# Patient Record
Sex: FEMALE | Race: WHITE | NOT HISPANIC OR LATINO | Employment: OTHER | URBAN - METROPOLITAN AREA
[De-identification: names, ages, dates, MRNs, and addresses within clinical notes are randomized per-mention and may not be internally consistent; named-entity substitution may affect disease eponyms.]

---

## 2020-08-05 ENCOUNTER — OFFICE VISIT (OUTPATIENT)
Dept: FAMILY MEDICINE CLINIC | Facility: CLINIC | Age: 50
End: 2020-08-05

## 2020-08-05 VITALS
DIASTOLIC BLOOD PRESSURE: 88 MMHG | TEMPERATURE: 97.1 F | HEART RATE: 74 BPM | BODY MASS INDEX: 26.63 KG/M2 | SYSTOLIC BLOOD PRESSURE: 138 MMHG | HEIGHT: 70 IN | WEIGHT: 186 LBS | OXYGEN SATURATION: 98 %

## 2020-08-05 DIAGNOSIS — N39.46 MIXED STRESS AND URGE URINARY INCONTINENCE: ICD-10-CM

## 2020-08-05 DIAGNOSIS — I48.91 ATRIAL FIBRILLATION, UNSPECIFIED TYPE (HCC): ICD-10-CM

## 2020-08-05 DIAGNOSIS — D50.8 OTHER IRON DEFICIENCY ANEMIA: ICD-10-CM

## 2020-08-05 DIAGNOSIS — Z95.0 PRESENCE OF PERMANENT CARDIAC PACEMAKER: ICD-10-CM

## 2020-08-05 DIAGNOSIS — E03.9 HYPOTHYROIDISM, UNSPECIFIED TYPE: Primary | ICD-10-CM

## 2020-08-05 DIAGNOSIS — R56.9 SEIZURES (HCC): ICD-10-CM

## 2020-08-05 DIAGNOSIS — I63.9 CEREBROVASCULAR ACCIDENT (CVA), UNSPECIFIED MECHANISM (HCC): ICD-10-CM

## 2020-08-05 PROBLEM — Z90.49 HX OF CHOLECYSTECTOMY: Status: ACTIVE | Noted: 2020-05-05

## 2020-08-05 PROBLEM — Z98.84 H/O BARIATRIC SURGERY: Status: ACTIVE | Noted: 2020-05-05

## 2020-08-05 PROBLEM — R07.9 CHEST PAIN: Status: ACTIVE | Noted: 2020-05-05

## 2020-08-05 PROCEDURE — 99214 OFFICE O/P EST MOD 30 MIN: CPT | Performed by: NURSE PRACTITIONER

## 2020-08-05 RX ORDER — GABAPENTIN 300 MG/1
300 CAPSULE ORAL
COMMUNITY
End: 2021-11-01

## 2020-08-05 RX ORDER — LEVOTHYROXINE SODIUM 0.07 MG/1
75 TABLET ORAL DAILY
COMMUNITY
End: 2020-09-11 | Stop reason: SDUPTHER

## 2020-08-05 RX ORDER — OXYBUTYNIN CHLORIDE 5 MG/1
5 TABLET ORAL 4 TIMES DAILY
Qty: 120 TABLET | Refills: 2 | Status: SHIPPED | OUTPATIENT
Start: 2020-08-05 | End: 2020-10-16

## 2020-08-05 RX ORDER — TRAZODONE HYDROCHLORIDE 50 MG/1
50 TABLET ORAL NIGHTLY
COMMUNITY
End: 2022-01-28 | Stop reason: SDUPTHER

## 2020-08-05 RX ORDER — TOPIRAMATE 100 MG/1
100 TABLET, FILM COATED ORAL NIGHTLY
COMMUNITY
End: 2022-01-28 | Stop reason: SDUPTHER

## 2020-08-05 RX ORDER — ASCORBIC ACID 500 MG
500 TABLET ORAL DAILY
COMMUNITY

## 2020-08-05 RX ORDER — ASPIRIN 81 MG/1
325 TABLET, CHEWABLE ORAL DAILY
COMMUNITY
End: 2021-08-11

## 2020-08-05 RX ORDER — FERROUS SULFATE 325(65) MG
325 TABLET ORAL
COMMUNITY

## 2020-08-05 RX ORDER — OXYBUTYNIN CHLORIDE 5 MG/1
5 TABLET ORAL 2 TIMES DAILY
COMMUNITY
End: 2020-08-05 | Stop reason: SDUPTHER

## 2020-08-05 NOTE — PROGRESS NOTES
Subjective   Hanh Mederos is a 49 y.o. female.     Chief Complaint   Patient presents with   • Establish Care   • Urinary Incontinence        History of Present Illness     Patient is here today to establish care as a new patient. She states she just moved back about 3 months ago and was seeing Dr. Vieira.  She states she had a stroke about 6 years ago and had had several issues since.  States she saw specialty in [place] there and they told her wasn't any issues with prolapse.       Had blood drawn the beginning of July and states all labs were stable.  She is always anemic per her.      INCONTINENCE:  5mg BID 4 months ago increase-has gotten just about 10% better.       STROKE: no seizures.  Had them since 17 months and then since stroke.  Stopped.  Topamax and trazodone control, as well as gabapentin.       She does state she has less patience lately and is watching for other symptoms of bipolar, but sees none. She has never had diagnosis, but both mother and father, as well as daughter has diagnosis.    She is also concerned about depression or her snappiness today.  She isn't sure if it is just because of being home and situational or what.       RIAZ 7 score of 0  PHQ 9 score of  3  MOOD questionnaire negative          The following portions of the patient's history were reviewed and updated as appropriate: allergies, current medications, past family history, past medical history, past social history, past surgical history and problem list.    Past Medical History:   Diagnosis Date   • A-fib (CMS/HCC)    • C. difficile colitis    • Cancer (CMS/HCC)     uterine   • CHF (congestive heart failure) (CMS/[facility])    • MRSA carrier    • Pacemaker    • Stroke (CMS/HCC)        Past Surgical History:   Procedure Laterality Date   • ABDOMINAL SURGERY      had 20   • HYSTERECTOMY  1998    total        Family History   Problem Relation Age of Onset   • Cancer Mother    • Hypertension Mother    • Stroke Mother    •  Hypertension Father    • Stroke Father        Social History     Socioeconomic History   • Marital status:      Spouse name: Not on file   • Number of children: Not on file   • Years of education: Not on file   • Highest education level: Not on file   Tobacco Use   • Smoking status: Never Smoker   • Smokeless tobacco: Never Used   Substance and Sexual Activity   • Alcohol use: Never     Frequency: Never   • Drug use: Never         Current Outpatient Medications:   •  aspirin 81 MG chewable tablet, Chew 81 mg Daily., Disp: , Rfl:   •  ferrous sulfate 325 (65 FE) MG tablet, Take 325 mg by mouth Daily With Breakfast., Disp: , Rfl:   •  gabapentin (NEURONTIN) 300 MG capsule, Take 300 mg by mouth., Disp: , Rfl:   •  levothyroxine (SYNTHROID, LEVOTHROID) 75 MCG tablet, Take 75 mcg by mouth Daily., Disp: , Rfl:   •  MULTIPLE VITAMINS-MINERALS ER PO, Take  by mouth., Disp: , Rfl:   •  oxybutynin (DITROPAN) 5 MG tablet, Take 1 tablet by mouth 4 (Four) Times a Day., Disp: 120 tablet, Rfl: 2  •  topiramate (TOPAMAX) 100 MG tablet, Take 100 mg by mouth Daily., Disp: , Rfl:   •  traZODone (DESYREL) 50 MG tablet, Take 50 mg by mouth Daily., Disp: , Rfl:   •  vitamin C (ASCORBIC ACID) 500 MG tablet, Take 500 mg by mouth Daily., Disp: , Rfl:     Review of Systems   Constitutional: Negative for fatigue and fever.   Respiratory: Negative for cough, shortness of breath and wheezing.    Cardiovascular: Negative for chest pain.   Gastrointestinal: Negative for abdominal pain, constipation, diarrhea, nausea and vomiting.   Genitourinary: Positive for urinary incontinence. Negative for dysuria and urgency.   Skin: Negative.    Neurological: Negative for dizziness and headache.   Psychiatric/Behavioral: Positive for depressed mood. Negative for sleep disturbance and suicidal ideas. The patient is nervous/anxious.        Objective   Vitals:    08/05/20 1350   BP: 138/88   Pulse: 74   Temp: 97.1 °F (36.2 °C)   SpO2: 98%   Weight: 84.4  "kg (186 lb)   Height: 177.8 cm (70\")     Body mass index is 26.69 kg/m².  Physical Exam   Constitutional: She is oriented to person, place, and time. She appears well-developed and well-nourished.   Cardiovascular: Normal rate, regular rhythm, normal heart sounds and intact distal pulses.   Pulmonary/Chest: Effort normal and breath sounds normal.   Neurological: She is alert and oriented to person, place, and time.   Psychiatric: She has a normal mood and affect. Her behavior is normal. Judgment and thought content normal.         Assessment/Plan   Hanh was seen today for establish care and urinary incontinence.    Diagnoses and all orders for this visit:    Hypothyroidism, unspecified type    Cerebrovascular accident (CVA), unspecified mechanism (CMS/HCC)    Presence of permanent cardiac pacemaker    Atrial fibrillation, unspecified type (CMS/HCC)    Seizures (CMS/HCC)    Other iron deficiency anemia    Mixed stress and urge urinary incontinence    Other orders  -     oxybutynin (DITROPAN) 5 MG tablet; Take 1 tablet by mouth 4 (Four) Times a Day.      HYPOTHYROID: continue medication-will obtain records. Will plan on rechecking level in 3 months    CVA-continue medication    AFIB/pacemaker-continue medication    SEIZURES-continue medication    MOOD: we discussed possible increase of topamax if worried about mood stabilization.  She doesn't want to right now, but will monitor mood and notify me if she feels like worsening. Discussed likely situational.      Iron def anemia: continue current medication: will recheck level at next OV    INCONTINENCE: will trial increase of ditropan.  If no resolution, may trial another medication.      Follow up in 3 months, sooner if any issues.     Time spent with patient was 50 minutes in room.             Patient Instructions   Urinary Incontinence    Urinary incontinence refers to a condition in which a person is unable to control where and when to pass urine. A person with this " condition will urinate when he or she does not mean to (involuntarily).  What are the causes?  This condition may be caused by:  · Medicines.  · Infections.  · Constipation.  · Overactive bladder muscles.  · Weak bladder muscles.  · Weak pelvic floor muscles. These muscles provide support for the bladder, intestine, and, in women, the uterus.  · Enlarged prostate in men. The prostate is a gland near the bladder. When it gets too big, it can pinch the urethra. With the urethra blocked, the bladder can weaken and lose the ability to empty properly.  · Surgery.  · Emotional factors, such as anxiety, stress, or post-traumatic stress disorder (PTSD).  · Pelvic organ prolapse. This happens in women when organs shift out of place and into the vagina. This shift can prevent the bladder and urethra from working properly.  What increases the risk?  The following factors may make you more likely to develop this condition:  · Older age.  · Obesity and physical inactivity.  · Pregnancy and childbirth.  · Menopause.  · Diseases that affect the nerves or spinal cord (neurological diseases).  · Long-term (chronic) coughing. This can increase pressure on the bladder and pelvic floor muscles.  What are the signs or symptoms?  Symptoms may vary depending on the type of urinary incontinence you have. They include:  · A sudden urge to urinate, but passing urine involuntarily before you can get to a bathroom (urge incontinence).  · Suddenly passing urine with any activity that forces urine to pass, such as coughing, laughing, exercise, or sneezing (stress incontinence).  · Needing to urinate often, but urinating only a small amount, or constantly dribbling urine (overflow incontinence).  · Urinating because you cannot get to the bathroom in time due to a physical disability, such as arthritis or injury, or communication and thinking problems, such as Alzheimer disease (functional incontinence).  How is this diagnosed?  This condition may  be diagnosed based on:  · Your medical history.  · A physical exam.  · Tests, such as:  ? Urine tests.  ? X-rays of your kidney and bladder.  ? Ultrasound.  ? CT scan.  ? Cystoscopy. In this procedure, a health care provider inserts a tube with a light and camera (cystoscope) through the urethra and into the bladder in order to check for problems.  ? Urodynamic testing. These tests assess how well the bladder, urethra, and sphincter can store and release urine. There are different types of urodynamic tests, and they vary depending on what the test is measuring.  To help diagnose your condition, your health care provider may recommend that you keep a log of when you urinate and how much you urinate.  How is this treated?  Treatment for this condition depends on the type of incontinence that you have and its cause. Treatment may include:  · Lifestyle changes, such as:  ? Quitting smoking.  ? Maintaining a healthy weight.  ? Staying active. Try to get 150 minutes of moderate-intensity exercise every week. Ask your health care provider which activities are safe for you.  ? Eating a healthy diet.  § Avoid high-fat foods, like fried foods.  § Avoid refined carbohydrates like white bread and white rice.  § Limit how much alcohol and caffeine you drink.  § Increase your fiber intake. Foods such as fresh fruits, vegetables, beans, and whole grains are healthy sources of fiber.  · Pelvic floor muscle exercises.  · Bladder training, such as lengthening the amount of time between bathroom breaks, or using the bathroom at regular intervals.  · Using techniques to suppress bladder urges. This can include distraction techniques or controlled breathing exercises.  · Medicines to relax the bladder muscles and prevent bladder spasms.  · Medicines to help slow or prevent the growth of a man's prostate.  · Botox injections. These can help relax the bladder muscles.  · Using pulses of electricity to help change bladder reflexes  (electrical nerve stimulation).  · For women, using a medical device to prevent urine leaks. This is a small, tampon-like, disposable device that is inserted into the urethra.  · Injecting collagen or carbon beads (bulking agents) into the urinary sphincter. These can help thicken tissue and close the bladder opening.  · Surgery.  Follow these instructions at home:  Lifestyle  · Limit alcohol and caffeine. These can fill your bladder quickly and irritate it.  · Keep yourself clean to help prevent odors and skin damage. Ask your doctor about special skin creams and cleansers that can protect the skin from urine.  · Consider wearing pads or adult diapers. Make sure to change them regularly, and always change them right after experiencing incontinence.  General instructions  · Take over-the-counter and prescription medicines only as told by your health care provider.  · Use the bathroom about every 3-4 hours, even if you do not feel the need to urinate. Try to empty your bladder completely every time. After urinating, wait a minute. Then try to urinate again.  · Make sure you are in a relaxed position while urinating.  · If your incontinence is caused by nerve problems, keep a log of the medicines you take and the times you go to the bathroom.  · Keep all follow-up visits as told by your health care provider. This is important.  Contact a health care provider if:  · You have pain that gets worse.  · Your incontinence gets worse.  Get help right away if:  · You have a fever or chills.  · You are unable to urinate.  · You have redness in your groin area or down your legs.  Summary  · Urinary incontinence refers to a condition in which a person is unable to control where and when to pass urine.  · This condition may be caused by medicines, infection, weak bladder muscles, weak pelvic floor muscles, enlargement of the prostate (in men), or surgery.  · The following factors increase your risk for developing this condition:  older age, obesity, pregnancy and childbirth, menopause, neurological diseases, and chronic coughing.  · There are several types of urinary incontinence. They include urge incontinence, stress incontinence, overflow incontinence, and functional incontinence.  · This condition is usually treated first with lifestyle and behavioral changes, such as quitting smoking, eating a healthier diet, and doing regular pelvic floor exercises. Other treatment options include medicines, bulking agents, medical devices, electrical nerve stimulation, or surgery.  This information is not intended to replace advice given to you by your health care provider. Make sure you discuss any questions you have with your health care provider.  Document Released: 01/25/2006 Document Revised: 12/28/2018 Document Reviewed: 03/29/2018  [employer] Patient Education © 2020 [employer]

## 2020-08-05 NOTE — PATIENT INSTRUCTIONS
Urinary Incontinence    Urinary incontinence refers to a condition in which a person is unable to control where and when to pass urine. A person with this condition will urinate when he or she does not mean to (involuntarily).  What are the causes?  This condition may be caused by:  · Medicines.  · Infections.  · Constipation.  · Overactive bladder muscles.  · Weak bladder muscles.  · Weak pelvic floor muscles. These muscles provide support for the bladder, intestine, and, in women, the uterus.  · Enlarged prostate in men. The prostate is a gland near the bladder. When it gets too big, it can pinch the urethra. With the urethra blocked, the bladder can weaken and lose the ability to empty properly.  · Surgery.  · Emotional factors, such as anxiety, stress, or post-traumatic stress disorder (PTSD).  · Pelvic organ prolapse. This happens in women when organs shift out of place and into the vagina. This shift can prevent the bladder and urethra from working properly.  What increases the risk?  The following factors may make you more likely to develop this condition:  · Older age.  · Obesity and physical inactivity.  · Pregnancy and childbirth.  · Menopause.  · Diseases that affect the nerves or spinal cord (neurological diseases).  · Long-term (chronic) coughing. This can increase pressure on the bladder and pelvic floor muscles.  What are the signs or symptoms?  Symptoms may vary depending on the type of urinary incontinence you have. They include:  · A sudden urge to urinate, but passing urine involuntarily before you can get to a bathroom (urge incontinence).  · Suddenly passing urine with any activity that forces urine to pass, such as coughing, laughing, exercise, or sneezing (stress incontinence).  · Needing to urinate often, but urinating only a small amount, or constantly dribbling urine (overflow incontinence).  · Urinating because you cannot get to the bathroom in time due to a physical disability, such as  arthritis or injury, or communication and thinking problems, such as Alzheimer disease (functional incontinence).  How is this diagnosed?  This condition may be diagnosed based on:  · Your medical history.  · A physical exam.  · Tests, such as:  ? Urine tests.  ? X-rays of your kidney and bladder.  ? Ultrasound.  ? CT scan.  ? Cystoscopy. In this procedure, a health care provider inserts a tube with a light and camera (cystoscope) through the urethra and into the bladder in order to check for problems.  ? Urodynamic testing. These tests assess how well the bladder, urethra, and sphincter can store and release urine. There are different types of urodynamic tests, and they vary depending on what the test is measuring.  To help diagnose your condition, your health care provider may recommend that you keep a log of when you urinate and how much you urinate.  How is this treated?  Treatment for this condition depends on the type of incontinence that you have and its cause. Treatment may include:  · Lifestyle changes, such as:  ? Quitting smoking.  ? Maintaining a healthy weight.  ? Staying active. Try to get 150 minutes of moderate-intensity exercise every week. Ask your health care provider which activities are safe for you.  ? Eating a healthy diet.  § Avoid high-fat foods, like fried foods.  § Avoid refined carbohydrates like white bread and white rice.  § Limit how much alcohol and caffeine you drink.  § Increase your fiber intake. Foods such as fresh fruits, vegetables, beans, and whole grains are healthy sources of fiber.  · Pelvic floor muscle exercises.  · Bladder training, such as lengthening the amount of time between bathroom breaks, or using the bathroom at regular intervals.  · Using techniques to suppress bladder urges. This can include distraction techniques or controlled breathing exercises.  · Medicines to relax the bladder muscles and prevent bladder spasms.  · Medicines to help slow or prevent the  growth of a man's prostate.  · Botox injections. These can help relax the bladder muscles.  · Using pulses of electricity to help change bladder reflexes (electrical nerve stimulation).  · For women, using a medical device to prevent urine leaks. This is a small, tampon-like, disposable device that is inserted into the urethra.  · Injecting collagen or carbon beads (bulking agents) into the urinary sphincter. These can help thicken tissue and close the bladder opening.  · Surgery.  Follow these instructions at home:  Lifestyle  · Limit alcohol and caffeine. These can fill your bladder quickly and irritate it.  · Keep yourself clean to help prevent odors and skin damage. Ask your doctor about special skin creams and cleansers that can protect the skin from urine.  · Consider wearing pads or adult diapers. Make sure to change them regularly, and always change them right after experiencing incontinence.  General instructions  · Take over-the-counter and prescription medicines only as told by your health care provider.  · Use the bathroom about every 3-4 hours, even if you do not feel the need to urinate. Try to empty your bladder completely every time. After urinating, wait a minute. Then try to urinate again.  · Make sure you are in a relaxed position while urinating.  · If your incontinence is caused by nerve problems, keep a log of the medicines you take and the times you go to the bathroom.  · Keep all follow-up visits as told by your health care provider. This is important.  Contact a health care provider if:  · You have pain that gets worse.  · Your incontinence gets worse.  Get help right away if:  · You have a fever or chills.  · You are unable to urinate.  · You have redness in your groin area or down your legs.  Summary  · Urinary incontinence refers to a condition in which a person is unable to control where and when to pass urine.  · This condition may be caused by medicines, infection, weak bladder  muscles, weak pelvic floor muscles, enlargement of the prostate (in men), or surgery.  · The following factors increase your risk for developing this condition: older age, obesity, pregnancy and childbirth, menopause, neurological diseases, and chronic coughing.  · There are several types of urinary incontinence. They include urge incontinence, stress incontinence, overflow incontinence, and functional incontinence.  · This condition is usually treated first with lifestyle and behavioral changes, such as quitting smoking, eating a healthier diet, and doing regular pelvic floor exercises. Other treatment options include medicines, bulking agents, medical devices, electrical nerve stimulation, or surgery.  This information is not intended to replace advice given to you by your health care provider. Make sure you discuss any questions you have with your health care provider.  Document Released: 01/25/2006 Document Revised: 12/28/2018 Document Reviewed: 03/29/2018  [employer] Patient Education © 2020 [employer]

## 2020-09-11 ENCOUNTER — OFFICE VISIT (OUTPATIENT)
Dept: FAMILY MEDICINE CLINIC | Facility: CLINIC | Age: 50
End: 2020-09-11

## 2020-09-11 VITALS
SYSTOLIC BLOOD PRESSURE: 118 MMHG | WEIGHT: 186 LBS | BODY MASS INDEX: 26.63 KG/M2 | TEMPERATURE: 96.9 F | HEIGHT: 70 IN | DIASTOLIC BLOOD PRESSURE: 68 MMHG | HEART RATE: 68 BPM | OXYGEN SATURATION: 100 %

## 2020-09-11 DIAGNOSIS — G43.909 MIGRAINE WITHOUT STATUS MIGRAINOSUS, NOT INTRACTABLE, UNSPECIFIED MIGRAINE TYPE: Primary | ICD-10-CM

## 2020-09-11 PROCEDURE — 99213 OFFICE O/P EST LOW 20 MIN: CPT | Performed by: NURSE PRACTITIONER

## 2020-09-11 RX ORDER — EPINEPHRINE 0.3 MG/.3ML
0.3 INJECTION SUBCUTANEOUS ONCE
Qty: 2 EACH | Refills: 2 | Status: SHIPPED | OUTPATIENT
Start: 2020-09-11 | End: 2020-09-11

## 2020-09-11 RX ORDER — LEVOTHYROXINE SODIUM 0.07 MG/1
75 TABLET ORAL DAILY
Qty: 90 TABLET | Refills: 0 | Status: SHIPPED | OUTPATIENT
Start: 2020-09-11 | End: 2021-04-07

## 2020-09-11 RX ORDER — RIZATRIPTAN BENZOATE 10 MG/1
10 TABLET, ORALLY DISINTEGRATING ORAL ONCE AS NEEDED
Qty: 12 TABLET | Refills: 0 | Status: SHIPPED | OUTPATIENT
Start: 2020-09-11 | End: 2021-01-08

## 2020-09-11 RX ORDER — NALOXONE HYDROCHLORIDE 4 MG/.1ML
1 SPRAY NASAL AS NEEDED
Qty: 2 EACH | Refills: 1 | Status: SHIPPED | OUTPATIENT
Start: 2020-09-11

## 2020-09-11 RX ORDER — LEVOTHYROXINE SODIUM 0.07 MG/1
75 TABLET ORAL DAILY
Qty: 90 TABLET | Refills: 0 | Status: SHIPPED | OUTPATIENT
Start: 2020-09-11 | End: 2020-09-11

## 2020-09-11 NOTE — PROGRESS NOTES
Subjective   Hanh Mederos is a 49 y.o. female.     Chief Complaint   Patient presents with   • Headache     needs rapid covid test        History of Present Illness       Patient is here today with complaint of headache.  She is not having any other symptoms of covid other than headache.     Having pressure behind left eye.    Rates that it is a level 5 yesterday and today is only a level 3 today.  States it is less than she left house.    Slept okay yesterday   had headache yesterday as well.    Snacked, didn't eat well yesterday.  Had her cast off 2 days ago.  Was on 3 extra weeks.       She reports needing a narcan and epipen.  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   She needs refills of her levothyroxine sent to [facility] because she is going as well.        The following portions of the patient's history were reviewed and updated as appropriate: allergies, current medications, past family history, past medical history, past social history, past surgical history and problem list.    Past Medical History:   Diagnosis Date   • A-fib (CMS/HCC)    • C. difficile colitis    • Cancer (CMS/HCC)     uterine   • CHF (congestive heart failure) (CMS/HCC)    • MRSA carrier    • Pacemaker    • Stroke (CMS/HCC)        Past Surgical History:   Procedure Laterality Date   • ABDOMINAL SURGERY      had    • HYSTERECTOMY      total        Family History   Problem Relation Age of Onset   • Cancer Mother    • Hypertension Mother    • Stroke Mother    • Hypertension Father    • Stroke Father        Social History     Socioeconomic History   • Marital status:      Spouse name: Not on file   • Number of children: Not on file   • Years of education: Not on file   • Highest education level: Not on file   Tobacco Use   • Smoking status: Never Smoker   • Smokeless tobacco: Never Used   Substance and Sexual Activity   • Alcohol use: Never      Frequency: Never   • Drug use: Never         Current Outpatient Medications:   •  aspirin 81 MG chewable tablet, Chew 81 mg Daily., Disp: , Rfl:   •  ferrous sulfate 325 (65 FE) MG tablet, Take 325 mg by mouth Daily With Breakfast., Disp: , Rfl:   •  gabapentin (NEURONTIN) 300 MG capsule, Take 300 mg by mouth., Disp: , Rfl:   •  levothyroxine (SYNTHROID, LEVOTHROID) 75 MCG tablet, Take 1 tablet by mouth Daily., Disp: 90 tablet, Rfl: 0  •  MULTIPLE VITAMINS-MINERALS ER PO, Take  by mouth., Disp: , Rfl:   •  oxybutynin (DITROPAN) 5 MG tablet, Take 1 tablet by mouth 4 (Four) Times a Day., Disp: 120 tablet, Rfl: 2  •  topiramate (TOPAMAX) 100 MG tablet, Take 100 mg by mouth Daily., Disp: , Rfl:   •  traZODone (DESYREL) 50 MG tablet, Take 50 mg by mouth Daily., Disp: , Rfl:   •  vitamin C (ASCORBIC ACID) 500 MG tablet, Take 500 mg by mouth Daily., Disp: , Rfl:   •  EPINEPHrine (EPIPEN) 0.3 MG/0.3ML solution auto-injector injection, Inject 0.3 mL into the appropriate muscle as directed by prescriber 1 (One) Time for 1 dose., Disp: 2 each, Rfl: 2  •  naloxone (Narcan) 4 MG/0.1ML nasal spray, 1 spray into the nostril(s) as directed by provider As Needed (overdose)., Disp: 2 each, Rfl: 1  •  rizatriptan MLT (MAXALT-MLT) 10 MG disintegrating tablet, Place 1 tablet on the tongue 1 (One) Time As Needed for Migraine for up to 1 dose. May repeat in 2 hours if needed, Disp: 12 tablet, Rfl: 0    Review of Systems   Constitutional: Negative for fatigue and fever.   Respiratory: Negative for cough, shortness of breath and wheezing.    Cardiovascular: Negative for chest pain.   Gastrointestinal: Negative for abdominal pain, constipation, diarrhea, nausea and vomiting.   Genitourinary: Negative for dysuria and urgency.   Skin: Negative.    Neurological: Positive for headache. Negative for dizziness.       Objective   Vitals:    09/11/20 1253   BP: 118/68   Pulse: 68   Temp: 96.9 °F (36.1 °C)   SpO2: 100%   Weight: 84.4 kg (186 lb)  "  Height: 177.8 cm (70\")     Body mass index is 26.69 kg/m².  Physical Exam   Constitutional: She is oriented to person, place, and time. She appears well-developed and well-nourished.   HENT:   Head: Normocephalic and atraumatic.   Right Ear: Tympanic membrane mobility is abnormal. cerumen impaction (not completely impacted, just residual ) is present.  Left Ear: Tympanic membrane mobility is abnormal. An impacted cerumen (not completely impacted, just residual) is present.  Cardiovascular: Normal rate, regular rhythm and normal heart sounds.   Pulmonary/Chest: Effort normal and breath sounds normal.   Neurological: She is alert and oriented to person, place, and time.   Psychiatric: She has a normal mood and affect. Her behavior is normal. Judgment and thought content normal.         Assessment/Plan   Hanh was seen today for headache.    Diagnoses and all orders for this visit:    Migraine without status migrainosus, not intractable, unspecified migraine type    Other orders  -     EPINEPHrine (EPIPEN) 0.3 MG/0.3ML solution auto-injector injection; Inject 0.3 mL into the appropriate muscle as directed by prescriber 1 (One) Time for 1 dose.  -     naloxone (Narcan) 4 MG/0.1ML nasal spray; 1 spray into the nostril(s) as directed by provider As Needed (overdose).  -     levothyroxine (SYNTHROID, LEVOTHROID) 75 MCG tablet; Take 1 tablet by mouth Daily.  -     rizatriptan MLT (MAXALT-MLT) 10 MG disintegrating tablet; Place 1 tablet on the tongue 1 (One) Time As Needed for Migraine for up to 1 dose. May repeat in 2 hours if needed      We will start maxalt.  Suggested allergy medication.    Also suggested use of allergy medication prn.    Follow up in office as needed if no improvement.             There are no Patient Instructions on file for this visit.        "

## 2020-09-11 NOTE — TELEPHONE ENCOUNTER
PATIENT CALLED STATED SHE IS GOING TO HELP WITH THE WILDFIRES AND WILL NEED A REFILL ON THE LEVOTHYROXINE TO LOCAL PHARMACY.

## 2020-10-08 ENCOUNTER — OFFICE VISIT (OUTPATIENT)
Dept: FAMILY MEDICINE CLINIC | Facility: CLINIC | Age: 50
End: 2020-10-08

## 2020-10-08 VITALS
OXYGEN SATURATION: 99 % | HEART RATE: 85 BPM | HEIGHT: 70 IN | WEIGHT: 185.2 LBS | SYSTOLIC BLOOD PRESSURE: 124 MMHG | BODY MASS INDEX: 26.51 KG/M2 | DIASTOLIC BLOOD PRESSURE: 78 MMHG | TEMPERATURE: 97.1 F

## 2020-10-08 DIAGNOSIS — E55.9 VITAMIN D DEFICIENCY, UNSPECIFIED: ICD-10-CM

## 2020-10-08 DIAGNOSIS — D50.8 OTHER IRON DEFICIENCY ANEMIA: ICD-10-CM

## 2020-10-08 DIAGNOSIS — Z12.31 ENCOUNTER FOR SCREENING MAMMOGRAM FOR BREAST CANCER: ICD-10-CM

## 2020-10-08 DIAGNOSIS — I63.9 CEREBROVASCULAR ACCIDENT (CVA), UNSPECIFIED MECHANISM (HCC): ICD-10-CM

## 2020-10-08 DIAGNOSIS — B35.3 ATHLETE'S FOOT ON LEFT: ICD-10-CM

## 2020-10-08 DIAGNOSIS — I48.91 ATRIAL FIBRILLATION, UNSPECIFIED TYPE (HCC): ICD-10-CM

## 2020-10-08 DIAGNOSIS — E03.9 HYPOTHYROIDISM, UNSPECIFIED TYPE: ICD-10-CM

## 2020-10-08 DIAGNOSIS — G43.909 MIGRAINE WITHOUT STATUS MIGRAINOSUS, NOT INTRACTABLE, UNSPECIFIED MIGRAINE TYPE: ICD-10-CM

## 2020-10-08 DIAGNOSIS — N39.46 MIXED STRESS AND URGE URINARY INCONTINENCE: ICD-10-CM

## 2020-10-08 DIAGNOSIS — Z00.01 ENCOUNTER FOR GENERAL ADULT MEDICAL EXAMINATION WITH ABNORMAL FINDINGS: Primary | ICD-10-CM

## 2020-10-08 DIAGNOSIS — Z12.31 ENCOUNTER FOR SCREENING MAMMOGRAM FOR MALIGNANT NEOPLASM OF BREAST: ICD-10-CM

## 2020-10-08 PROCEDURE — 99396 PREV VISIT EST AGE 40-64: CPT | Performed by: NURSE PRACTITIONER

## 2020-10-08 RX ORDER — CLOTRIMAZOLE 1 %
CREAM (GRAM) TOPICAL 2 TIMES DAILY
Qty: 60 G | Refills: 0 | Status: SHIPPED | OUTPATIENT
Start: 2020-10-08 | End: 2021-05-03
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
## 2020-10-09 ENCOUNTER — RESULTS ENCOUNTER (OUTPATIENT)
Dept: FAMILY MEDICINE CLINIC | Facility: CLINIC | Age: 50
End: 2020-10-09

## 2020-10-09 DIAGNOSIS — E03.9 HYPOTHYROIDISM, UNSPECIFIED TYPE: ICD-10-CM

## 2020-10-09 DIAGNOSIS — D50.8 OTHER IRON DEFICIENCY ANEMIA: ICD-10-CM

## 2020-10-09 DIAGNOSIS — I48.91 ATRIAL FIBRILLATION, UNSPECIFIED TYPE (HCC): ICD-10-CM

## 2020-10-09 DIAGNOSIS — E55.9 VITAMIN D DEFICIENCY, UNSPECIFIED: ICD-10-CM

## 2020-10-10 LAB
25(OH)D3+25(OH)D2 SERPL-MCNC: 48.8 NG/ML (ref 30–100)
ALBUMIN SERPL-MCNC: 4.6 G/DL (ref 3.5–5.2)
ALBUMIN/GLOB SERPL: 2.7 G/DL
ALP SERPL-CCNC: 148 U/L (ref 39–117)
ALT SERPL-CCNC: 17 U/L (ref 1–33)
AST SERPL-CCNC: 21 U/L (ref 1–32)
BASOPHILS # BLD AUTO: 0.04 10*3/MM3 (ref 0–0.2)
BASOPHILS NFR BLD AUTO: 0.9 % (ref 0–1.5)
BILIRUB SERPL-MCNC: 0.4 MG/DL (ref 0–1.2)
BUN SERPL-MCNC: 13 MG/DL (ref 6–20)
BUN/CREAT SERPL: 14.1 (ref 7–25)
CALCIUM SERPL-MCNC: 9.2 MG/DL (ref 8.6–10.5)
CHLORIDE SERPL-SCNC: 108 MMOL/L (ref 98–107)
CHOLEST SERPL-MCNC: 142 MG/DL (ref 0–200)
CO2 SERPL-SCNC: 25.8 MMOL/L (ref 22–29)
CREAT SERPL-MCNC: 0.92 MG/DL (ref 0.57–1)
EOSINOPHIL # BLD AUTO: 0.1 10*3/MM3 (ref 0–0.4)
EOSINOPHIL NFR BLD AUTO: 2.2 % (ref 0.3–6.2)
ERYTHROCYTE [DISTWIDTH] IN BLOOD BY AUTOMATED COUNT: 13.4 % (ref 12.3–15.4)
FERRITIN SERPL-MCNC: 49.7 NG/ML (ref 13–150)
GLOBULIN SER CALC-MCNC: 1.7 GM/DL
GLUCOSE SERPL-MCNC: 83 MG/DL (ref 65–99)
HCT VFR BLD AUTO: 40.7 % (ref 34–46.6)
HDLC SERPL-MCNC: 54 MG/DL (ref 40–60)
HGB BLD-MCNC: 13.4 G/DL (ref 12–15.9)
IMM GRANULOCYTES # BLD AUTO: 0.02 10*3/MM3 (ref 0–0.05)
IMM GRANULOCYTES NFR BLD AUTO: 0.4 % (ref 0–0.5)
IRON SATN MFR SERPL: 28 % (ref 20–50)
IRON SERPL-MCNC: 103 MCG/DL (ref 37–145)
LDLC SERPL CALC-MCNC: 68 MG/DL (ref 0–100)
LYMPHOCYTES # BLD AUTO: 1.07 10*3/MM3 (ref 0.7–3.1)
LYMPHOCYTES NFR BLD AUTO: 23.3 % (ref 19.6–45.3)
MCH RBC QN AUTO: 28.6 PG (ref 26.6–33)
MCHC RBC AUTO-ENTMCNC: 32.9 G/DL (ref 31.5–35.7)
MCV RBC AUTO: 87 FL (ref 79–97)
MONOCYTES # BLD AUTO: 0.33 10*3/MM3 (ref 0.1–0.9)
MONOCYTES NFR BLD AUTO: 7.2 % (ref 5–12)
NEUTROPHILS # BLD AUTO: 3.03 10*3/MM3 (ref 1.7–7)
NEUTROPHILS NFR BLD AUTO: 66 % (ref 42.7–76)
NRBC BLD AUTO-RTO: 0 /100 WBC (ref 0–0.2)
PLATELET # BLD AUTO: 198 10*3/MM3 (ref 140–450)
POTASSIUM SERPL-SCNC: 4.1 MMOL/L (ref 3.5–5.2)
PROT SERPL-MCNC: 6.3 G/DL (ref 6–8.5)
RBC # BLD AUTO: 4.68 10*6/MM3 (ref 3.77–5.28)
SODIUM SERPL-SCNC: 143 MMOL/L (ref 136–145)
TIBC SERPL-MCNC: 373 MCG/DL
TRIGL SERPL-MCNC: 111 MG/DL (ref 0–150)
TSH SERPL DL<=0.005 MIU/L-ACNC: 1.15 UIU/ML (ref 0.27–4.2)
UIBC SERPL-MCNC: 270 MCG/DL (ref 112–346)
VIT B12 SERPL-MCNC: 705 PG/ML (ref 211–946)
VLDLC SERPL CALC-MCNC: 20 MG/DL (ref 5–40)
WBC # BLD AUTO: 4.59 10*3/MM3 (ref 3.4–10.8)

## 2020-10-19 RX ORDER — TOLTERODINE 4 MG/1
4 CAPSULE, EXTENDED RELEASE ORAL DAILY
Qty: 30 CAPSULE | Refills: 2 | Status: SHIPPED | OUTPATIENT
Start: 2020-10-19 | End: 2020-10-21

## 2020-10-19 RX ORDER — OXYBUTYNIN CHLORIDE 5 MG/1
TABLET ORAL
Qty: 360 TABLET | OUTPATIENT
Start: 2020-10-19

## 2020-10-20 ENCOUNTER — TELEPHONE (OUTPATIENT)
Dept: FAMILY MEDICINE CLINIC | Facility: CLINIC | Age: 50
End: 2020-10-20

## 2020-10-20 NOTE — TELEPHONE ENCOUNTER
PATIENT CALLED AND STATED THAT SHE IS NOT ABLE TO AFFORD THE CO PAYMENTS, THE INSURANCE SAID THAT THE PRESCRIPTION FOR OXIBUYNIN CAN BE CALLED IN FOR HER TO , CAUSE SHE DID NOT  THE OTHER ONES THAT WERE CALLED IN BEFORE. DOES THE DOCTOR WANT HER TO UP THE DOSAGE OF OXYBUYNIN OR SEND IN ANOTHER PRESCRIPTION. PLEASE ADVISE    CALL BACK VERIFIED [phone]    PHARMACY VERIFIED [address]

## 2020-10-21 RX ORDER — OXYBUTYNIN CHLORIDE 5 MG/1
5 TABLET ORAL 4 TIMES DAILY
Qty: 120 TABLET | Refills: 2 | Status: SHIPPED | OUTPATIENT
Start: 2020-10-21 | End: 2021-11-15

## 2020-11-05 ENCOUNTER — OFFICE VISIT (OUTPATIENT)
Dept: FAMILY MEDICINE CLINIC | Facility: CLINIC | Age: 50
End: 2020-11-05

## 2020-11-05 VITALS
DIASTOLIC BLOOD PRESSURE: 68 MMHG | WEIGHT: 188.2 LBS | HEART RATE: 95 BPM | BODY MASS INDEX: 26.94 KG/M2 | HEIGHT: 70 IN | SYSTOLIC BLOOD PRESSURE: 106 MMHG | TEMPERATURE: 98.2 F | OXYGEN SATURATION: 99 %

## 2020-11-05 DIAGNOSIS — N39.46 MIXED STRESS AND URGE URINARY INCONTINENCE: ICD-10-CM

## 2020-11-05 DIAGNOSIS — B35.3 ATHLETE'S FOOT ON LEFT: Primary | ICD-10-CM

## 2020-11-05 PROCEDURE — 99213 OFFICE O/P EST LOW 20 MIN: CPT | Performed by: NURSE PRACTITIONER

## 2020-11-05 RX ORDER — PRENATAL VIT 91/IRON/FOLIC/DHA 28-975-200
COMBINATION PACKAGE (EA) ORAL NIGHTLY
Qty: 42 G | Refills: 1 | Status: SHIPPED | OUTPATIENT
Start: 2020-11-05 | End: 2021-05-03
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
## 2020-11-25 ENCOUNTER — HOSPITAL ENCOUNTER (OUTPATIENT)
Dept: MAMMOGRAPHY | Facility: HOSPITAL | Age: 50
Discharge: HOME | End: 2020-11-25
Admitting: NURSE PRACTITIONER

## 2020-11-25 DIAGNOSIS — Z12.31 ENCOUNTER FOR SCREENING MAMMOGRAM FOR BREAST CANCER: ICD-10-CM

## 2020-11-25 PROCEDURE — 77063 BREAST TOMOSYNTHESIS BI: CPT

## 2020-11-25 PROCEDURE — 77067 SCR MAMMO BI INCL CAD: CPT

## 2020-12-11 ENCOUNTER — OFFICE VISIT (OUTPATIENT)
Dept: FAMILY MEDICINE CLINIC | Facility: CLINIC | Age: 50
End: 2020-12-11

## 2020-12-11 VITALS
DIASTOLIC BLOOD PRESSURE: 82 MMHG | BODY MASS INDEX: 26.6 KG/M2 | HEART RATE: 68 BPM | WEIGHT: 185.8 LBS | SYSTOLIC BLOOD PRESSURE: 112 MMHG | TEMPERATURE: 98.2 F | OXYGEN SATURATION: 98 % | RESPIRATION RATE: 18 BRPM | HEIGHT: 70 IN

## 2020-12-11 DIAGNOSIS — Z78.0 POST-MENOPAUSAL: ICD-10-CM

## 2020-12-11 DIAGNOSIS — R03.0 ELEVATED BLOOD PRESSURE READING WITHOUT DIAGNOSIS OF HYPERTENSION: Primary | ICD-10-CM

## 2020-12-11 DIAGNOSIS — I63.9 CEREBROVASCULAR ACCIDENT (CVA), UNSPECIFIED MECHANISM (HCC): ICD-10-CM

## 2020-12-11 PROBLEM — I49.5 SICK SINUS SYNDROME DUE TO SA NODE DYSFUNCTION (HCC): Status: ACTIVE | Noted: 2018-10-09

## 2020-12-11 PROBLEM — Z95.0 PACEMAKER: Status: ACTIVE | Noted: 2018-10-09

## 2020-12-11 PROBLEM — Z86.718 HISTORY OF DVT (DEEP VEIN THROMBOSIS): Status: ACTIVE | Noted: 2018-10-09

## 2020-12-11 PROBLEM — Z86.711 HISTORY OF PULMONARY EMBOLUS (PE): Status: ACTIVE | Noted: 2018-10-09

## 2020-12-11 PROBLEM — Z45.010 PACEMAKER AT END OF BATTERY LIFE: Status: ACTIVE | Noted: 2018-12-10

## 2020-12-11 PROCEDURE — 99213 OFFICE O/P EST LOW 20 MIN: CPT | Performed by: NURSE PRACTITIONER

## 2020-12-11 RX ORDER — LISINOPRIL 5 MG/1
5 TABLET ORAL DAILY
Qty: 30 TABLET | Refills: 2 | Status: SHIPPED | OUTPATIENT
Start: 2020-12-11 | End: 2021-03-12

## 2020-12-11 RX ORDER — AMMONIUM LACTATE 12 G/100G
LOTION TOPICAL AS NEEDED
Qty: 222 G | Refills: 1 | Status: SHIPPED | OUTPATIENT
Start: 2020-12-11 | End: 2021-01-08 | Stop reason: SDUPTHER

## 2020-12-11 NOTE — PROGRESS NOTES
Subjective   Hanh Mederos is a 50 y.o. female.     Chief Complaint   Patient presents with   • Hypertension     133/99        History of Present Illness     Patient is here today with complaint of hypertension.  She hasn't had BP issues in the past.  She has had headaches lately and has been checking BP.  She has had stroke in past so wants it managed.     She has custody of granddaughter and her mother is giving her hard time.          The following portions of the patient's history were reviewed and updated as appropriate: allergies, current medications, past family history, past medical history, past social history, past surgical history and problem list.    Past Medical History:   Diagnosis Date   • A-fib (CMS/HCC)    • Broken wrist    • C. difficile colitis    • Cancer (CMS/HCC)     uterine   • CHF (congestive heart failure) (CMS/HCC)    • MRSA carrier    • Pacemaker    • Stroke (CMS/HCC)        Past Surgical History:   Procedure Laterality Date   • ABDOMINAL SURGERY      had 20   • BREAST BIOPSY Right     pt had a stroke and has difficulty remember dates   • HYSTERECTOMY  1998    total    • OOPHORECTOMY         Family History   Problem Relation Age of Onset   • Cancer Mother    • Hypertension Mother    • Stroke Mother    • Breast cancer Mother    • Hypertension Father    • Stroke Father    • Breast cancer Sister        Social History     Socioeconomic History   • Marital status:      Spouse name: Not on file   • Number of children: Not on file   • Years of education: Not on file   • Highest education level: Not on file   Tobacco Use   • Smoking status: Never Smoker   • Smokeless tobacco: Never Used   Substance and Sexual Activity   • Alcohol use: Never     Frequency: Never   • Drug use: Never         Current Outpatient Medications:   •  aspirin 81 MG chewable tablet, Chew 81 mg Daily., Disp: , Rfl:   •  ferrous sulfate 325 (65 FE) MG tablet, Take 325 mg by mouth Daily With Breakfast., Disp: , Rfl:   •   gabapentin (NEURONTIN) 300 MG capsule, Take 300 mg by mouth., Disp: , Rfl:   •  levothyroxine (SYNTHROID, LEVOTHROID) 75 MCG tablet, Take 1 tablet by mouth Daily., Disp: 90 tablet, Rfl: 0  •  MULTIPLE VITAMINS-MINERALS ER PO, Take  by mouth., Disp: , Rfl:   •  naloxone (Narcan) 4 MG/0.1ML nasal spray, 1 spray into the nostril(s) as directed by provider As Needed (overdose)., Disp: 2 each, Rfl: 1  •  oxybutynin (DITROPAN) 5 MG tablet, Take 1 tablet by mouth 4 (Four) Times a Day., Disp: 120 tablet, Rfl: 2  •  topiramate (TOPAMAX) 100 MG tablet, Take 100 mg by mouth Daily., Disp: , Rfl:   •  traZODone (DESYREL) 50 MG tablet, Take 50 mg by mouth Daily., Disp: , Rfl:   •  vitamin C (ASCORBIC ACID) 500 MG tablet, Take 500 mg by mouth Daily., Disp: , Rfl:   •  ammonium lactate (AmLactin) 12 % lotion, Apply  topically to the appropriate area as directed As Needed for Dry Skin., Disp: 222 g, Rfl: 1  •  clotrimazole (LOTRIMIN) 1 % cream, Apply  topically to the appropriate area as directed 2 (Two) Times a Day., Disp: 60 g, Rfl: 0  •  lisinopril (PRINIVIL,ZESTRIL) 5 MG tablet, Take 1 tablet by mouth Daily., Disp: 30 tablet, Rfl: 2  •  rizatriptan MLT (MAXALT-MLT) 10 MG disintegrating tablet, Place 1 tablet on the tongue 1 (One) Time As Needed for Migraine for up to 1 dose. May repeat in 2 hours if needed, Disp: 12 tablet, Rfl: 0  •  terbinafine (lamISIL) 1 % cream, Apply  topically to the appropriate area as directed Every Night., Disp: 42 g, Rfl: 1    Review of Systems   Constitutional: Negative for fatigue and fever.   Respiratory: Negative for cough, shortness of breath and wheezing.    Cardiovascular: Negative for chest pain.   Gastrointestinal: Negative for abdominal pain, constipation, diarrhea, nausea and vomiting.   Genitourinary: Negative for dysuria and urgency.   Skin: Negative.    Neurological: Positive for headache. Negative for dizziness.   Psychiatric/Behavioral: Negative for suicidal ideas and depressed mood.  "The patient is not nervous/anxious.        Objective   Vitals:    12/11/20 1425   BP: 112/82   BP Location: Left arm   Patient Position: Sitting   Cuff Size: Adult   Pulse: 68   Resp: 18   Temp: 98.2 °F (36.8 °C)   TempSrc: Temporal   SpO2: 98%   Weight: 84.3 kg (185 lb 12.8 oz)   Height: 177.8 cm (70\")     Body mass index is 26.66 kg/m².  Physical Exam  Constitutional:       Appearance: Normal appearance.   Neurological:      Mental Status: She is alert and oriented to person, place, and time.   Psychiatric:         Mood and Affect: Mood normal.         Behavior: Behavior normal.         Thought Content: Thought content normal.         Judgment: Judgment normal.           Assessment/Plan   Diagnoses and all orders for this visit:    1. Elevated blood pressure reading without diagnosis of hypertension (Primary)    2. Cerebrovascular accident (CVA), unspecified mechanism (CMS/HCC)    3. Post-menopausal  -     DEXA Bone Density Axial    Other orders  -     lisinopril (PRINIVIL,ZESTRIL) 5 MG tablet; Take 1 tablet by mouth Daily.  Dispense: 30 tablet; Refill: 2  -     ammonium lactate (AmLactin) 12 % lotion; Apply  topically to the appropriate area as directed As Needed for Dry Skin.  Dispense: 222 g; Refill: 1      After discussion with patient I believe her elevated blood pressure is related to her stressors.  I would like to start on blood pressure medicine, however, because she has these history of CVA.  I would like her to monitor her blood pressure and if her blood pressure is less than 100 on the top or less than 60 diastolic she should hold blood pressure medicine.  Patient verbalizes understanding.  Follow-up with me as needed.  Keep follow-up as regularly scheduled unless she has any issues with blood pressure or medication.  Patient verbalizes understanding.           There are no Patient Instructions on file for this visit.        "

## 2021-01-08 ENCOUNTER — TELEMEDICINE (OUTPATIENT)
Dept: FAMILY MEDICINE CLINIC | Facility: CLINIC | Age: 51
End: 2021-01-08

## 2021-01-08 DIAGNOSIS — J06.9 UPPER RESPIRATORY TRACT INFECTION, UNSPECIFIED TYPE: ICD-10-CM

## 2021-01-08 DIAGNOSIS — Z20.822 SUSPECTED 2019 NOVEL CORONAVIRUS INFECTION: Primary | ICD-10-CM

## 2021-01-08 PROCEDURE — 99213 OFFICE O/P EST LOW 20 MIN: CPT | Performed by: FAMILY MEDICINE

## 2021-01-08 RX ORDER — AMMONIUM LACTATE 12 G/100G
LOTION TOPICAL AS NEEDED
Qty: 222 G | Refills: 1 | Status: SHIPPED | OUTPATIENT
Start: 2021-01-08 | End: 2021-05-03

## 2021-01-08 NOTE — PATIENT INSTRUCTIONS
Push fluids and rest.  Follow-up as needed with any worsening symptoms.  Make sure to stay in isolation.  Go to ER for severe shortness of breath.  Use Mucinex or Delsym for cough as needed.

## 2021-01-08 NOTE — PROGRESS NOTES
This was an audio and video enabled telemedicine encounter.  Length of visit 15 minutes.    Chief Complaint  Headache (sneezing), Cough, and Fever (low grade 100.1)    Subjective          Hanh Mederos presents to [facility] for   Here for virtual visit.  Started with HA 2 days ago which is not unusual.  Then yesterday her eyes started watering.  HA got worse and moved to other bedroom.  Has an immunocompromised .  Took temperature last night at midnight and it was 100.1.  Took tylenol and went to lay back down.  Does have a dry cough.  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  Did wear a mask all the time there.         Objective   Vital Signs:   There were no vitals taken for this visit.    Physical Exam  Constitutional:       General: She is not in acute distress.     Appearance: She is well-developed.   HENT:      Head: Normocephalic and atraumatic.   Eyes:      Conjunctiva/sclera: Conjunctivae normal.      Pupils: Pupils are equal, round, and reactive to light.   Pulmonary:      Effort: Pulmonary effort is normal. No respiratory distress.   Neurological:      Mental Status: She is alert and oriented to person, place, and time.        Result Review :                 Assessment and Plan    Problem List Items Addressed This Visit     None      Visit Diagnoses     Suspected 2019 novel coronavirus infection    -  Primary    Relevant Orders    COVID-19,[facility] ROUTINE, NP/OP SWAB IN TRANSPORT MEDIA OR ESWAB 72 HR TAT - Swab, Oropharynx    QUESTIONNAIRE SERIES (Completed)    Upper respiratory tract infection, unspecified type        Relevant Orders    COVID-19,[facility] ROUTINE, NP/OP SWAB IN TRANSPORT MEDIA OR ESWAB 72 HR TAT - Swab, Oropharynx          Follow Up   No follow-ups on file.  Patient was given instructions and counseling regarding her condition or for health maintenance advice. Please see specific information pulled into the AVS  if appropriate.

## 2021-01-10 LAB — SARS-COV-2 RNA RESP QL NAA+PROBE: DETECTED

## 2021-01-12 ENCOUNTER — APPOINTMENT (OUTPATIENT)
Dept: BONE DENSITY | Facility: HOSPITAL | Age: 51
End: 2021-01-12

## 2021-02-08 ENCOUNTER — TELEPHONE (OUTPATIENT)
Dept: FAMILY MEDICINE CLINIC | Facility: CLINIC | Age: 51
End: 2021-02-08

## 2021-02-08 ENCOUNTER — OFFICE VISIT (OUTPATIENT)
Dept: FAMILY MEDICINE CLINIC | Facility: CLINIC | Age: 51
End: 2021-02-08

## 2021-02-08 VITALS
WEIGHT: 189.8 LBS | SYSTOLIC BLOOD PRESSURE: 126 MMHG | HEART RATE: 98 BPM | DIASTOLIC BLOOD PRESSURE: 70 MMHG | TEMPERATURE: 96.6 F | OXYGEN SATURATION: 98 % | HEIGHT: 70 IN | BODY MASS INDEX: 27.17 KG/M2

## 2021-02-08 DIAGNOSIS — S29.012A STRAIN OF THORACIC BACK REGION: Primary | ICD-10-CM

## 2021-02-08 PROCEDURE — 99213 OFFICE O/P EST LOW 20 MIN: CPT | Performed by: NURSE PRACTITIONER

## 2021-02-08 RX ORDER — TIZANIDINE 4 MG/1
4 TABLET ORAL EVERY 8 HOURS PRN
Qty: 30 TABLET | Refills: 0 | Status: SHIPPED | OUTPATIENT
Start: 2021-02-08 | End: 2021-06-01 | Stop reason: SDUPTHER

## 2021-02-08 NOTE — PATIENT INSTRUCTIONS
Thoracic Strain Rehab  Ask your health care provider which exercises are safe for you. Do exercises exactly as told by your health care provider and adjust them as directed. It is normal to feel mild stretching, pulling, tightness, or discomfort as you do these exercises. Stop right away if you feel sudden pain or your pain gets worse. Do not begin these exercises until told by your health care provider.  Stretching and range-of-motion exercise  This exercise warms up your muscles and joints and improves the movement and flexibility of your back and shoulders. This exercise also helps to relieve pain.  Chest and spine stretch    1. Lie down on your back on a firm surface.  2. Roll a towel or a small blanket so it is about 4 inches (10 cm) in diameter.  3. Put the towel lengthwise under the middle of your back so it is under your spine, but not under your shoulder blades.  4. Put your hands behind your head and let your elbows fall to your sides. This will increase your stretch.  5. Take a deep breath (inhale).  6. Hold for __________ seconds.  7. Relax after you breathe out (exhale).  Repeat __________ times. Complete this exercise __________ times a day.  Strengthening exercises  These exercises build strength and endurance in your back and your shoulder blade muscles. Endurance is the ability to use your muscles for a long time, even after they get tired.  Alternating arm and leg raises    1. Get on your hands and knees on a firm surface. If you are on a hard floor, you may want to use padding, such as an exercise mat, to cushion your knees.  2. Line up your arms and legs. Your hands should be directly below your shoulders, and your knees should be directly below your hips.  3. Lift your left leg behind you. At the same time, raise your right arm and straighten it in front of you.  ? Do not lift your leg higher than your hip.  ? Do not lift your arm higher than your shoulder.  ? Keep your abdominal and back  muscles tight.  ? Keep your hips facing the ground.  ? Do not arch your back.  ? Keep your balance carefully, and do not hold your breath.  4. Hold for __________ seconds.  5. Slowly return to the starting position and repeat with your right leg and your left arm.  Repeat __________ times. Complete this exercise __________ times a day.  Straight arm rows  This exercise is also called shoulder extension exercise.  1. Stand with your feet shoulder width apart.  2. Secure an exercise band to a stable object in front of you so the band is at or above shoulder height.  3. Hold one end of the exercise band in each hand.  4. Straighten your elbows and lift your hands up to shoulder height.  5. Step back, away from the secured end of the exercise band, until the band stretches.  6. Squeeze your shoulder blades together and pull your hands down to the sides of your thighs. Stop when your hands are straight down by your sides. This is shoulder extension. Do not let your hands go behind your body.  7. Hold for __________ seconds.  8. Slowly return to the starting position.  Repeat __________ times. Complete this exercise __________ times a day.  Prone shoulder external rotation  1. Lie on your abdomen on a firm bed so your left / right forearm hangs over the edge of the bed and your upper arm is on the bed, straight out from your body. This is the prone position.  ? Your elbow should be bent.  ? Your palm should be facing your feet.  2. If instructed, hold a __________ weight in your hand.  3. Squeeze your shoulder blade toward the middle of your back. Do not let your shoulder lift toward your ear.  4. Keep your elbow bent in a 90-degree angle (right angle) while you slowly move your forearm up toward the ceiling. Move your forearm up to the height of the bed, toward your head. This is external rotation.  ? Your upper arm should not move.  ? At the top of the movement, your palm should face the floor.  5. Hold for __________  seconds.  6. Slowly return to the starting position and relax your muscles.  Repeat __________ times. Complete this exercise __________ times a day.  Rowing scapular retraction  This is an exercise in which the shoulder blades (scapulae) are pulled toward each other (retraction).  1. Sit in a stable chair without armrests, or stand up.  2. Secure an exercise band to a stable object in front of you so the band is at shoulder height.  3. Hold one end of the exercise band in each hand. Your palms should face down.  4. Bring your arms out straight in front of you.  5. Step back, away from the secured end of the exercise band, until the band stretches.  6. Pull the band backward. As you do this, bend your elbows and squeeze your shoulder blades together, but avoid letting the rest of your body move. Do not shrug your shoulders upward while you do this.  7. Stop when your elbows are at your sides or slightly behind your body.  8. Hold for __________ seconds.  9. Slowly straighten your arms to return to the starting position.  Repeat __________ times. Complete this exercise __________ times a day.  Posture and body mechanics  Good posture and healthy body mechanics can help to relieve stress in your body's tissues and joints. Body mechanics refers to the movements and positions of your body while you do your daily activities. Posture is part of body mechanics. Good posture means:  · Your spine is in its natural S-curve position (neutral).  · Your shoulders are pulled back slightly.  · Your head is not tipped forward.  Follow these guidelines to improve your posture and body mechanics in your everyday activities.  Standing    · When standing, keep your spine neutral and your feet about hip width apart. Keep a slight bend in your knees. Your ears, shoulders, and hips should line up with each other.  · When you do a task in which you lean forward while standing in one place for a long time, place one foot up on a stable  object that is 2-4 inches (5-10 cm) high, such as a footstool. This helps keep your spine neutral.  Sitting    · When sitting, keep your spine neutral and keep your feet flat on the floor. Use a footrest, if necessary, and keep your thighs parallel to the floor. Avoid rounding your shoulders, and avoid tilting your head forward.  · When working at a desk or a computer, keep your desk at a height where your hands are slightly lower than your elbows. Slide your chair under your desk so you are close enough to maintain good posture.  · When working at a computer, place your monitor at a height where you are looking straight ahead and you do not have to tilt your head forward or downward to look at the screen.  Resting  When lying down and resting, avoid positions that are most painful for you.  · If you have pain with activities such as sitting, bending, stooping, or squatting (flexion-basedactivities), lie in a position in which your body does not bend very much. For example, avoid curling up on your side with your arms and knees near your chest (fetal position).  · If you have pain with activities such as standing for a long time or reaching with your arms (extension-basedactivities), lie with your spine in a neutral position and bend your knees slightly. Try the following positions:  ? Lie on your side with a pillow between your knees.  ? Lie on your back with a pillow under your knees.    Lifting    · When lifting objects, keep your feet at least shoulder width apart and tighten your abdominal muscles.  · Bend your knees and hips and keep your spine neutral. It is important to lift using the strength of your legs, not your back. Do not lock your knees straight out.  · Always ask for help to lift heavy or awkward objects.  This information is not intended to replace advice given to you by your health care provider. Make sure you discuss any questions you have with your health care provider.  Document Revised:  04/10/2020 Document Reviewed: 01/27/2020  Elsevier Patient Education © 2020 [employer]

## 2021-02-08 NOTE — TELEPHONE ENCOUNTER
----- Message from EMILIANA Penn sent at 2/8/2021 10:13 AM EST -----  Please call [facility] and see which Iron supplement injection they have in stock and I would like to order for her.

## 2021-02-08 NOTE — TELEPHONE ENCOUNTER
MADHU FROM [facility] CALLED AND SAID THEY CAN ORDER AN IM INJECTION FOR IRON, AND IF YOU CHOOSE TO DO THAT PT CAN  THE NEXT DAY AND WE WOULD HAVE TO ADMINISTER IT. SHE SAID A TEST DOSE IS USUALLY RECOMMENDED BECAUSE THE REACTION RATE IS SO HIGH. THE INJECTION IS CALLED INFED.

## 2021-02-08 NOTE — PROGRESS NOTES
"Chief Complaint  Back Pain    Subjective          Hanh Mederos presents to [facility] for   History of Present Illness     Patient is here today with complaint of back pain.   She helped her son move about a month ago and felt her back pull.  She has been using OTC relief, but pain hasn't gone away.       Doesn't like taking pain medication.   Rest   Heat compress.    Rates pain at a 4 on scale of 1-10. Is nagging.  Goes up to 6 with certain movements.   Pain is in middle of back and goes to lower.    Denies any pain, numbness or tingling into legs or arms.      Also has a new service dog right now.    She also states that she is scheduled to give plasma and her iron is always low.  She states that they asked her to get iron injection.        Objective   Vital Signs:   /70   Pulse 98   Temp 96.6 °F (35.9 °C)   Ht 177.8 cm (70\")   Wt 86.1 kg (189 lb 12.8 oz)   SpO2 98%   BMI 27.23 kg/m²     Physical Exam  Constitutional:       Appearance: Normal appearance.   Cardiovascular:      Rate and Rhythm: Normal rate and regular rhythm.   Pulmonary:      Effort: Pulmonary effort is normal.      Breath sounds: Normal breath sounds.   Musculoskeletal:      Thoracic back: She exhibits tenderness and spasm. She exhibits normal range of motion.   Neurological:      Mental Status: She is alert and oriented to person, place, and time.   Psychiatric:         Mood and Affect: Mood normal.         Behavior: Behavior normal.         Thought Content: Thought content normal.         Judgment: Judgment normal.        Result Review :                 Assessment and Plan    Problem List Items Addressed This Visit     None      Visit Diagnoses     Strain of thoracic back region    -  Primary        Patient has point tenderness of the thoracic area.  Discussed with her going ahead and with an x-ray versus treatment with medication, stretching, and ice therapy.  Patient would like to trial medication " and home stretching versus chiropractory or physical therapy currently.  I am okay with this.  Stretches given.  Medication given patient has naproxen that she will take at home.  If no improvement call chiropractory or let me know and I will put in physical therapy order.  Let me know if she decides she wants an x-ray.  Patient verbalizes understanding.    I am okay with iron injection however, we do not have those here.  [facility], her pharmacy does not seem to have either.  I instructed her to call around and let us know if she finds them will call order in.      Follow Up   Return if symptoms worsen or fail to improve.  Patient was given instructions and counseling regarding her condition or for health maintenance advice. Please see specific information pulled into the AVS if appropriate.

## 2021-02-09 ENCOUNTER — TELEPHONE (OUTPATIENT)
Dept: FAMILY MEDICINE CLINIC | Facility: CLINIC | Age: 51
End: 2021-02-09

## 2021-02-10 ENCOUNTER — TELEPHONE (OUTPATIENT)
Dept: FAMILY MEDICINE CLINIC | Facility: CLINIC | Age: 51
End: 2021-02-10

## 2021-02-10 NOTE — TELEPHONE ENCOUNTER
Patient called in stating she will need the iron injection, please call to advise.    Best call back # [phone]

## 2021-02-10 NOTE — TELEPHONE ENCOUNTER
Pt was already at her appt to donate plasma, she said she ate iron rich food and if shes unable to donate today she will call back so we can order the injection for her

## 2021-02-10 NOTE — TELEPHONE ENCOUNTER
lalo infed injection into [facility], pharmacy said sheould be in tomorrow for . Pt has to bring here. Called pt she is aware

## 2021-02-11 ENCOUNTER — CLINICAL SUPPORT (OUTPATIENT)
Dept: FAMILY MEDICINE CLINIC | Facility: CLINIC | Age: 51
End: 2021-02-11

## 2021-02-11 DIAGNOSIS — D50.8 OTHER IRON DEFICIENCY ANEMIA: Primary | ICD-10-CM

## 2021-02-11 DIAGNOSIS — D50.9 IRON DEFICIENCY ANEMIA, UNSPECIFIED IRON DEFICIENCY ANEMIA TYPE: ICD-10-CM

## 2021-02-11 PROCEDURE — 96372 THER/PROPH/DIAG INJ SC/IM: CPT | Performed by: NURSE PRACTITIONER

## 2021-02-16 ENCOUNTER — OFFICE VISIT (OUTPATIENT)
Dept: FAMILY MEDICINE CLINIC | Facility: CLINIC | Age: 51
End: 2021-02-16

## 2021-02-16 VITALS
HEART RATE: 98 BPM | DIASTOLIC BLOOD PRESSURE: 62 MMHG | SYSTOLIC BLOOD PRESSURE: 122 MMHG | TEMPERATURE: 96 F | HEIGHT: 70 IN | WEIGHT: 191.2 LBS | OXYGEN SATURATION: 99 % | BODY MASS INDEX: 27.37 KG/M2

## 2021-02-16 DIAGNOSIS — L03.213 PERIORBITAL CELLULITIS OF LEFT EYE: Primary | ICD-10-CM

## 2021-02-16 PROCEDURE — 99214 OFFICE O/P EST MOD 30 MIN: CPT | Performed by: FAMILY MEDICINE

## 2021-02-16 RX ORDER — LEVOFLOXACIN 500 MG/1
500 TABLET, FILM COATED ORAL DAILY
Qty: 7 TABLET | Refills: 0 | Status: SHIPPED | OUTPATIENT
Start: 2021-02-16 | End: 2021-04-09

## 2021-02-16 RX ORDER — FESOTERODINE FUMARATE 8 MG/1
TABLET, EXTENDED RELEASE ORAL
COMMUNITY
End: 2021-05-03

## 2021-02-16 NOTE — PROGRESS NOTES
"Chief Complaint  Facial Swelling (left eye )    Subjective          Hanh Mederos presents to [facility]  Patient is here today with a new problem.  She currently sees EMILIANA Vazquez.  She started noticing some irritation of her left eye last night.  It looked slightly swollen in the lower eyelid initially.  Then today about 3 hours ago the patient noticed some purplish discoloration in the left lower eyelid.  There is still swelling in the left lower eyelid but there is no pain.  The patient has not tried any eyedrops.  She did call her eye doctor today but they are closed.  Patient denies any fevers, chills or vision changes.  She denies any malaise or fatigue.       Objective   Vital Signs:   /62   Pulse 98   Temp 96 °F (35.6 °C)   Ht 177.8 cm (70\")   Wt 86.7 kg (191 lb 3.2 oz)   SpO2 99%   BMI 27.43 kg/m²     Physical Exam  Vitals signs and nursing note reviewed.   Constitutional:       Appearance: Normal appearance. She is well-developed.   HENT:      Head: Normocephalic and atraumatic.   Eyes:      General: No scleral icterus.        Right eye: No discharge.         Left eye: No discharge.      Extraocular Movements: Extraocular movements intact.      Conjunctiva/sclera: Conjunctivae normal.      Pupils: Pupils are equal, round, and reactive to light.      Comments: The left lower eyelid is slightly swollen with very mild erythema, no ecchymosis and no induration.  Extraocular muscles are all intact.  Patient reports normal vision   Neck:      Musculoskeletal: Normal range of motion and neck supple.   Cardiovascular:      Rate and Rhythm: Normal rate and regular rhythm.      Heart sounds: Normal heart sounds.   Pulmonary:      Effort: Pulmonary effort is normal.      Breath sounds: Normal breath sounds.   Musculoskeletal: Normal range of motion.   Skin:     General: Skin is warm and dry.      Capillary Refill: Capillary refill takes less than 2 seconds.      " Findings: No rash.   Neurological:      Mental Status: She is alert and oriented to person, place, and time.   Psychiatric:         Mood and Affect: Mood normal.         Behavior: Behavior normal.         Thought Content: Thought content normal.         Judgment: Judgment normal.        Result Review :                 Assessment and Plan    Diagnoses and all orders for this visit:    1. Periorbital cellulitis of left eye (Primary)  Comments:  very mild/early?  Orders:  -     levoFLOXacin (Levaquin) 500 MG tablet; Take 1 tablet by mouth Daily.  Dispense: 7 tablet; Refill: 0    The patient's symptoms are very mild at this time and it is difficult to tell if this is just inflammation from a stye that is developing on the left lower eyelid or if it could possibly be an early cellulitis.  Because the weather conditions are poor and I am concerned about follow-up care I will go ahead and prescribe the patient antibiotics but have her wait to start them until she utilizes some warm moist compresses as often as possible throughout the next 24 hours.  At any time if her symptoms are worsening or she develops pain she may start the antibiotic.  It was very challenging choosing an antibiotic for this patient and I had to choose my absolute last choice because the patient was allergic to penicillins, cephalosporins, sulfa, and clindamycin.  I discussed in detail the risk of tendon rupture with fluoroquinolones and advised the patient not to take iron or vitamins with Levaquin.  Patient understands to return to office or contact me if she develops any new or worsening symptoms.  Patient was advised that if she does start the antibiotic then she needs to follow-up with her PCP next week or sooner if she is having any worsening symptoms.    Follow Up   No follow-ups on file.  Patient was given instructions and counseling regarding her condition or for health maintenance advice. Please see specific information pulled into the AVS  if appropriate.

## 2021-02-17 VITALS — TEMPERATURE: 96.8 F

## 2021-03-12 RX ORDER — LISINOPRIL 5 MG/1
TABLET ORAL
Qty: 90 TABLET | Refills: 0 | Status: SHIPPED | OUTPATIENT
Start: 2021-03-12 | End: 2021-06-14

## 2021-03-26 ENCOUNTER — BULK ORDERING (OUTPATIENT)
Dept: CASE MANAGEMENT | Facility: OTHER | Age: 51
End: 2021-03-26

## 2021-03-26 DIAGNOSIS — Z23 IMMUNIZATION DUE: ICD-10-CM

## 2021-04-07 RX ORDER — LEVOTHYROXINE SODIUM 0.07 MG/1
TABLET ORAL
Qty: 90 TABLET | Refills: 0 | Status: SHIPPED | OUTPATIENT
Start: 2021-04-07 | End: 2021-07-12

## 2021-04-09 ENCOUNTER — OFFICE VISIT (OUTPATIENT)
Dept: FAMILY MEDICINE CLINIC | Facility: CLINIC | Age: 51
End: 2021-04-09

## 2021-04-09 VITALS
OXYGEN SATURATION: 99 % | TEMPERATURE: 96.8 F | WEIGHT: 182.6 LBS | DIASTOLIC BLOOD PRESSURE: 62 MMHG | HEIGHT: 70 IN | BODY MASS INDEX: 26.14 KG/M2 | HEART RATE: 91 BPM | SYSTOLIC BLOOD PRESSURE: 122 MMHG

## 2021-04-09 DIAGNOSIS — M62.831 CHARLEYHORSE: ICD-10-CM

## 2021-04-09 DIAGNOSIS — S29.012A STRAIN OF THORACIC BACK REGION: Primary | ICD-10-CM

## 2021-04-09 DIAGNOSIS — D50.9 IRON DEFICIENCY ANEMIA, UNSPECIFIED IRON DEFICIENCY ANEMIA TYPE: ICD-10-CM

## 2021-04-09 DIAGNOSIS — E03.9 HYPOTHYROIDISM, UNSPECIFIED TYPE: ICD-10-CM

## 2021-04-09 DIAGNOSIS — T14.8XXA BRUISING: Primary | ICD-10-CM

## 2021-04-09 DIAGNOSIS — E55.9 VITAMIN D DEFICIENCY, UNSPECIFIED: ICD-10-CM

## 2021-04-09 PROCEDURE — 99214 OFFICE O/P EST MOD 30 MIN: CPT | Performed by: NURSE PRACTITIONER

## 2021-04-09 RX ORDER — GABAPENTIN 600 MG/1
600 TABLET ORAL 3 TIMES DAILY PRN
Qty: 90 TABLET | Refills: 0 | Status: SHIPPED | OUTPATIENT
Start: 2021-04-09 | End: 2021-05-03

## 2021-04-09 RX ORDER — GABAPENTIN 300 MG/1
300 CAPSULE ORAL
Status: CANCELLED | OUTPATIENT
Start: 2021-04-09

## 2021-04-09 NOTE — PROGRESS NOTES
"Chief Complaint  Leg Pain    Subjective          Hanh Mederos presents to [facility]  History of Present Illness     Patient is here today with complaint of bruise that that been on her left upper arm  for over 40 days.  She states that she cannot remember any injury or issue.  It looks like a fingerprint in 3 fingers.     She is also having horrible rosamaria horses in bilateral legs.  She states she has had them daily  For last 3 days.   Has had them before and then go away after a couple days.     Objective   Vital Signs:   /62   Pulse 91   Temp 96.8 °F (36 °C)   Ht 177.8 cm (70\")   Wt 82.8 kg (182 lb 9.6 oz)   SpO2 99%   BMI 26.20 kg/m²     Physical Exam  Constitutional:       Appearance: Normal appearance.   Skin:     General: Skin is warm and dry.      Findings: Bruising (X3 to left upper arm in fingerprint fashion) present.   Neurological:      Mental Status: She is alert and oriented to person, place, and time.   Psychiatric:         Mood and Affect: Mood normal.         Behavior: Behavior normal.         Thought Content: Thought content normal.         Judgment: Judgment normal.        Result Review :                 Assessment and Plan    Diagnoses and all orders for this visit:    1. Bruising (Primary)  -     CBC & Differential  -     Comprehensive Metabolic Panel  -     TSH  -     Magnesium  -     Protime-INR  -     Vitamin B12  -     Vitamin D 25 Hydroxy    2. Charleyhorse  -     CBC & Differential  -     Comprehensive Metabolic Panel  -     TSH  -     Magnesium  -     Protime-INR  -     Vitamin B12  -     Vitamin D 25 Hydroxy    3. Iron deficiency anemia, unspecified iron deficiency anemia type  -     CBC & Differential  -     Comprehensive Metabolic Panel  -     TSH  -     Magnesium  -     Protime-INR  -     Vitamin B12  -     Vitamin D 25 Hydroxy    4. Hypothyroidism, unspecified type  -     TSH    5. Vitamin D deficiency, unspecified   -     Vitamin D 25 " Hydroxy      Will obtain labs and call with results any changes needed plan of care.  I did discuss with patient that some people are just more easily prone to bruising.  I am suspicious of this bruise as she reports she has had it for prolonged time frame.  I discussed with her I would like to check her PT/INR, platelet count, anemia level and these are things I am suspicious for with her bruising.       Will also check electrolytes for issues with charley horses and make recommendations based on these results.  Patient verbalizes understanding       Follow Up   Return if symptoms worsen or fail to improve, for Next scheduled follow up.  Patient was given instructions and counseling regarding her condition or for health maintenance advice. Please see specific information pulled into the AVS if appropriate.

## 2021-04-10 LAB
25(OH)D3+25(OH)D2 SERPL-MCNC: 47.3 NG/ML (ref 30–100)
ALBUMIN SERPL-MCNC: 4.2 G/DL (ref 3.5–5.2)
ALBUMIN/GLOB SERPL: 2.5 G/DL
ALP SERPL-CCNC: 113 U/L (ref 39–117)
ALT SERPL-CCNC: 14 U/L (ref 1–33)
AST SERPL-CCNC: 26 U/L (ref 1–32)
BASOPHILS # BLD AUTO: 0.05 10*3/MM3 (ref 0–0.2)
BASOPHILS NFR BLD AUTO: 1.4 % (ref 0–1.5)
BILIRUB SERPL-MCNC: 0.3 MG/DL (ref 0–1.2)
BUN SERPL-MCNC: 16 MG/DL (ref 6–20)
BUN/CREAT SERPL: 16 (ref 7–25)
CALCIUM SERPL-MCNC: 8.9 MG/DL (ref 8.6–10.5)
CHLORIDE SERPL-SCNC: 109 MMOL/L (ref 98–107)
CO2 SERPL-SCNC: 25.4 MMOL/L (ref 22–29)
CREAT SERPL-MCNC: 1 MG/DL (ref 0.57–1)
EOSINOPHIL # BLD AUTO: 0.11 10*3/MM3 (ref 0–0.4)
EOSINOPHIL NFR BLD AUTO: 3.1 % (ref 0.3–6.2)
ERYTHROCYTE [DISTWIDTH] IN BLOOD BY AUTOMATED COUNT: 12.8 % (ref 12.3–15.4)
GLOBULIN SER CALC-MCNC: 1.7 GM/DL
GLUCOSE SERPL-MCNC: 79 MG/DL (ref 65–99)
HCT VFR BLD AUTO: 38.5 % (ref 34–46.6)
HGB BLD-MCNC: 12.3 G/DL (ref 12–15.9)
IMM GRANULOCYTES # BLD AUTO: 0 10*3/MM3 (ref 0–0.05)
IMM GRANULOCYTES NFR BLD AUTO: 0 % (ref 0–0.5)
INR PPP: 0.9 (ref 0.9–1.1)
LYMPHOCYTES # BLD AUTO: 0.86 10*3/MM3 (ref 0.7–3.1)
LYMPHOCYTES NFR BLD AUTO: 24.2 % (ref 19.6–45.3)
MAGNESIUM SERPL-MCNC: 2.3 MG/DL (ref 1.6–2.6)
MCH RBC QN AUTO: 28.3 PG (ref 26.6–33)
MCHC RBC AUTO-ENTMCNC: 31.9 G/DL (ref 31.5–35.7)
MCV RBC AUTO: 88.7 FL (ref 79–97)
MONOCYTES # BLD AUTO: 0.29 10*3/MM3 (ref 0.1–0.9)
MONOCYTES NFR BLD AUTO: 8.2 % (ref 5–12)
NEUTROPHILS # BLD AUTO: 2.24 10*3/MM3 (ref 1.7–7)
NEUTROPHILS NFR BLD AUTO: 63.1 % (ref 42.7–76)
NRBC BLD AUTO-RTO: 0 /100 WBC (ref 0–0.2)
PLATELET # BLD AUTO: 186 10*3/MM3 (ref 140–450)
POTASSIUM SERPL-SCNC: 4.6 MMOL/L (ref 3.5–5.2)
PROT SERPL-MCNC: 5.9 G/DL (ref 6–8.5)
PROTHROMBIN TIME: 12 SECONDS (ref 11.7–14.2)
RBC # BLD AUTO: 4.34 10*6/MM3 (ref 3.77–5.28)
SODIUM SERPL-SCNC: 143 MMOL/L (ref 136–145)
TSH SERPL DL<=0.005 MIU/L-ACNC: 1.06 UIU/ML (ref 0.27–4.2)
VIT B12 SERPL-MCNC: 683 PG/ML (ref 211–946)
WBC # BLD AUTO: 3.55 10*3/MM3 (ref 3.4–10.8)

## 2021-05-03 ENCOUNTER — OFFICE VISIT (OUTPATIENT)
Dept: FAMILY MEDICINE CLINIC | Facility: CLINIC | Age: 51
End: 2021-05-03

## 2021-05-03 VITALS
OXYGEN SATURATION: 99 % | BODY MASS INDEX: 27.09 KG/M2 | HEIGHT: 70 IN | DIASTOLIC BLOOD PRESSURE: 70 MMHG | HEART RATE: 97 BPM | TEMPERATURE: 97.7 F | WEIGHT: 189.2 LBS | SYSTOLIC BLOOD PRESSURE: 112 MMHG

## 2021-05-03 DIAGNOSIS — H61.23 BILATERAL IMPACTED CERUMEN: ICD-10-CM

## 2021-05-03 DIAGNOSIS — H60.502 ACUTE OTITIS EXTERNA OF LEFT EAR, UNSPECIFIED TYPE: Primary | ICD-10-CM

## 2021-05-03 PROCEDURE — 69210 REMOVE IMPACTED EAR WAX UNI: CPT | Performed by: FAMILY MEDICINE

## 2021-05-03 PROCEDURE — 99213 OFFICE O/P EST LOW 20 MIN: CPT | Performed by: FAMILY MEDICINE

## 2021-05-03 RX ORDER — CIPROFLOXACIN AND DEXAMETHASONE 3; 1 MG/ML; MG/ML
4 SUSPENSION/ DROPS AURICULAR (OTIC) 2 TIMES DAILY
Qty: 7.5 ML | Refills: 0 | Status: SHIPPED | OUTPATIENT
Start: 2021-05-03 | End: 2021-08-11

## 2021-05-03 NOTE — PROGRESS NOTES
"Chief Complaint  Ear Fullness (Left)    Subjective          Hanh Mederos presents to [facility]  Is here for new problem today.  She started 2 days ago with left ear fullness and pressure.  It is increased with swallowing.  Patient denies any hearing loss or ringing in her years.  She also denies any dizziness.  Patient does not have any URI symptoms.      Objective   Vital Signs:   /70   Pulse 97   Temp 97.7 °F (36.5 °C)   Ht 177.8 cm (70\")   Wt 85.8 kg (189 lb 3.2 oz)   SpO2 99%   BMI 27.15 kg/m²     Physical Exam  Vitals and nursing note reviewed.   Constitutional:       Appearance: Normal appearance. She is well-developed.   HENT:      Head: Normocephalic and atraumatic.      Right Ear: Hearing, tympanic membrane and external ear normal.      Left Ear: Hearing normal. Tympanic membrane is erythematous (Slightly erythematous superior portion of the TM only, clear and shiny).      Ears:      Comments: Soft cerumen was easily removed from the right ear canal with instrumentation.  Left EAC with mild erythema after hardened cerumen was removed with irrigation and instrumentation.    Eyes:      General: No scleral icterus.     Conjunctiva/sclera: Conjunctivae normal.   Musculoskeletal:      Cervical back: Normal range of motion and neck supple.   Neurological:      Mental Status: She is alert and oriented to person, place, and time.   Psychiatric:         Mood and Affect: Mood normal.         Behavior: Behavior normal.         Thought Content: Thought content normal.         Judgment: Judgment normal.        Result Review :          Ear Cerumen Removal    Date/Time: 5/3/2021 5:07 PM  Performed by: Estee Sawyer DO  Authorized by: Estee Sawyer DO     Anesthesia:  Local Anesthetic: none  Location details: left ear and right ear  Patient tolerance: patient tolerated the procedure well with no immediate complications  Procedure type: instrumentation and irrigation "   Sedation:  Patient sedated: no              Assessment and Plan    Diagnoses and all orders for this visit:    1. Acute otitis externa of left ear, unspecified type (Primary)  -     ciprofloxacin-dexamethasone (CIPRODEX) 0.3-0.1 % otic suspension; Administer 4 drops into the left ear 2 (Two) Times a Day.  Dispense: 7.5 mL; Refill: 0    2. Bilateral impacted cerumen  -     Ear Cerumen Removal    After the left ear was irrigated it was found to have some erythema of the EAC.  Therefore I have started her on Ciprodex since she is allergic to Cortisporin otic.  If symptoms persist return the office.      Follow Up   Return if symptoms worsen or fail to improve.  Patient was given instructions and counseling regarding her condition or for health maintenance advice. Please see specific information pulled into the AVS if appropriate.

## 2021-06-01 ENCOUNTER — OFFICE VISIT (OUTPATIENT)
Dept: FAMILY MEDICINE CLINIC | Facility: CLINIC | Age: 51
End: 2021-06-01

## 2021-06-01 VITALS
BODY MASS INDEX: 26.74 KG/M2 | WEIGHT: 186.8 LBS | HEIGHT: 70 IN | DIASTOLIC BLOOD PRESSURE: 84 MMHG | TEMPERATURE: 98 F | OXYGEN SATURATION: 98 % | HEART RATE: 95 BPM | SYSTOLIC BLOOD PRESSURE: 122 MMHG

## 2021-06-01 DIAGNOSIS — M54.6 PAIN IN THORACIC SPINE: ICD-10-CM

## 2021-06-01 DIAGNOSIS — M54.50 ACUTE MIDLINE LOW BACK PAIN WITHOUT SCIATICA: Primary | ICD-10-CM

## 2021-06-01 DIAGNOSIS — M54.50 ACUTE MIDLINE LOW BACK PAIN WITHOUT SCIATICA: ICD-10-CM

## 2021-06-01 DIAGNOSIS — L30.9 DERMATITIS: ICD-10-CM

## 2021-06-01 DIAGNOSIS — Z78.0 POST-MENOPAUSAL: Primary | ICD-10-CM

## 2021-06-01 PROCEDURE — 99214 OFFICE O/P EST MOD 30 MIN: CPT | Performed by: FAMILY MEDICINE

## 2021-06-01 RX ORDER — TIZANIDINE 4 MG/1
4 TABLET ORAL EVERY 8 HOURS PRN
Qty: 30 TABLET | Refills: 0 | Status: SHIPPED | OUTPATIENT
Start: 2021-06-01 | End: 2022-07-13 | Stop reason: SDUPTHER

## 2021-06-01 RX ORDER — LIDOCAINE 50 MG/G
2 PATCH TOPICAL EVERY 24 HOURS
Qty: 60 EACH | Refills: 1 | Status: SHIPPED | OUTPATIENT
Start: 2021-06-01 | End: 2021-09-08 | Stop reason: SDUPTHER

## 2021-06-01 NOTE — PROGRESS NOTES
"Chief Complaint  Back Pain (Lower Back, not urinary) and Skin Lesion (Right Foot)    Subjective          Hanh Mederos presents to [facility]  Low back pain for the past 10 days.  Intermittent.  Had a spasm last night sitting.  Has tried heating pad and warm shower.  No radiation.  Pain right in the middle.  Does not like to take meds.  Dog may have pulled it.  Saw a chiropractor years ago and used a tens but now has a pacemaker.     Has spot on her right foot of that she thinks it fungal.  Is OK to try OTC treatment.            Objective   Vital Signs:   /84   Pulse 95   Temp 98 °F (36.7 °C)   Ht 177.8 cm (70\")   Wt 84.7 kg (186 lb 12.8 oz)   SpO2 98%   BMI 26.80 kg/m²     Physical Exam  Constitutional:       General: She is not in acute distress.     Appearance: Normal appearance. She is well-developed.   HENT:      Head: Normocephalic and atraumatic.      Right Ear: External ear normal.      Left Ear: External ear normal.   Eyes:      Conjunctiva/sclera: Conjunctivae normal.      Pupils: Pupils are equal, round, and reactive to light.   Neck:      Thyroid: No thyromegaly.   Pulmonary:      Effort: Pulmonary effort is normal.   Musculoskeletal:         General: Tenderness present. No swelling, deformity or signs of injury. Normal range of motion.      Comments: Lower  through spinous processes and lower thoracic and upper lumbar with some paraspinous tenderness   Skin:     Comments: Slight scaly hyperpigmented rash right lateral foot   Neurological:      Mental Status: She is alert and oriented to person, place, and time.      Sensory: No sensory deficit.      Motor: No weakness.      Coordination: Coordination normal.      Gait: Gait normal.      Deep Tendon Reflexes: Reflexes normal.      Comments: Slight decreased hip flexor strength in the left compared to right which is stable since CVA   Psychiatric:         Mood and Affect: Mood normal.         Behavior: " Behavior normal.        Result Review :                 Assessment and Plan    Diagnoses and all orders for this visit:    1. Acute midline low back pain without sciatica (Primary)  -     XR Spine Lumbar 2 or 3 View; Future  -     tiZANidine (ZANAFLEX) 4 MG tablet; Take 1 tablet by mouth Every 8 (Eight) Hours As Needed for Muscle Spasms.  Dispense: 30 tablet; Refill: 0  -     lidocaine (Lidoderm) 5 %; Place 2 patches on the skin as directed by provider Daily. Remove & Discard patch within 12 hours or as directed by MD  Dispense: 60 each; Refill: 1    2. Pain in thoracic spine  -     XR Spine Thoracic 2 View; Future  -     tiZANidine (ZANAFLEX) 4 MG tablet; Take 1 tablet by mouth Every 8 (Eight) Hours As Needed for Muscle Spasms.  Dispense: 30 tablet; Refill: 0  -     lidocaine (Lidoderm) 5 %; Place 2 patches on the skin as directed by provider Daily. Remove & Discard patch within 12 hours or as directed by MD  Dispense: 60 each; Refill: 1    3. Dermatitis    Back pain-need to rule out compression fracture  Dermatitis-we will treat over-the-counter    Follow Up   No follow-ups on file.  Patient was given instructions and counseling regarding her condition or for health maintenance advice. Please see specific information pulled into the AVS if appropriate.

## 2021-06-01 NOTE — PATIENT INSTRUCTIONS
Try the muscle relaxer to get rest at night.    Use the lighted Derm for pain relief during the day.    Follow-up pending results of x-ray.

## 2021-06-03 DIAGNOSIS — S32.020A COMPRESSION FRACTURE OF L2, CLOSED, INITIAL ENCOUNTER (HCC): Primary | ICD-10-CM

## 2021-06-03 DIAGNOSIS — S32.030S COMPRESSION FRACTURE OF L3 LUMBAR VERTEBRA, SEQUELA: ICD-10-CM

## 2021-06-03 DIAGNOSIS — S22.070A COMPRESSION FRACTURE OF T9 VERTEBRA, INITIAL ENCOUNTER (HCC): ICD-10-CM

## 2021-06-08 ENCOUNTER — TELEPHONE (OUTPATIENT)
Dept: FAMILY MEDICINE CLINIC | Facility: CLINIC | Age: 51
End: 2021-06-08

## 2021-06-08 NOTE — TELEPHONE ENCOUNTER
I called pt she said she was a little nauseated and had a headache. I offered her an appt, she declined and said she would see how she felt tomorrow

## 2021-06-08 NOTE — TELEPHONE ENCOUNTER
Caller: Rosa MHanh    Relationship: Self    Best call back number: [phone]    Who are you requesting to speak with (clinical staff, provider,  specific staff member): CLINICAL     What was the call regarding: PATIENT CALLED AND STATED SHE HAS BEEN EXPERIENCING EXTREME FATIGUE FOR 2 DAYS  AND SOME NAUSEA/ LIGHTHEADNESS/ HEADACHES STARTED TODAY. PATIENT HAS HAD BOTH COVID VACCINES AND DOES NOT BELIEVE SHE HAS BEEN EXPOSED TO ANYONE. PATIENT WOULD LIKE TO KNOW WHAT TO DO     Do you require a callback: YES

## 2021-06-10 ENCOUNTER — TELEPHONE (OUTPATIENT)
Dept: ORTHOPEDIC SURGERY | Facility: CLINIC | Age: 51
End: 2021-06-10

## 2021-06-10 NOTE — TELEPHONE ENCOUNTER
Hub as this patient scheduled for you in august but want to know if you will work in sooner because she has l2,l3,t9 compression fx and xray in media/please advise

## 2021-06-14 ENCOUNTER — TELEPHONE (OUTPATIENT)
Dept: ORTHOPEDIC SURGERY | Facility: CLINIC | Age: 51
End: 2021-06-14

## 2021-06-14 RX ORDER — LISINOPRIL 5 MG/1
TABLET ORAL
Qty: 90 TABLET | Refills: 0 | Status: SHIPPED | OUTPATIENT
Start: 2021-06-14 | End: 2021-09-09

## 2021-06-14 NOTE — TELEPHONE ENCOUNTER
Hub needs to know about this patient that has L2-L3, T9 compression fx to see if you will work in/please advise

## 2021-06-14 NOTE — TELEPHONE ENCOUNTER
No, the x-ray reports suggest that these are all chronic fractures.  If the patient thinks they may be acute then she probably needs to be seen within the next few weeks but I cannot overbook her and she would need to find someone else.

## 2021-07-12 RX ORDER — LEVOTHYROXINE SODIUM 0.07 MG/1
TABLET ORAL
Qty: 90 TABLET | Refills: 0 | Status: SHIPPED | OUTPATIENT
Start: 2021-07-12 | End: 2021-09-28

## 2021-08-03 ENCOUNTER — OFFICE VISIT (OUTPATIENT)
Dept: ORTHOPEDIC SURGERY | Facility: CLINIC | Age: 51
End: 2021-08-03

## 2021-08-03 VITALS — HEIGHT: 66 IN | BODY MASS INDEX: 30.37 KG/M2 | WEIGHT: 189 LBS

## 2021-08-03 DIAGNOSIS — M41.9 ACQUIRED SCOLIOSIS: ICD-10-CM

## 2021-08-03 DIAGNOSIS — M54.9 MULTILEVEL SPINE PAIN: Primary | ICD-10-CM

## 2021-08-03 PROCEDURE — 99203 OFFICE O/P NEW LOW 30 MIN: CPT | Performed by: ORTHOPAEDIC SURGERY

## 2021-08-03 PROCEDURE — 72080 X-RAY EXAM THORACOLMB 2/> VW: CPT | Performed by: ORTHOPAEDIC SURGERY

## 2021-08-03 NOTE — PROGRESS NOTES
New patient or new problem visit    CC: She complains of sudden onset of back pain back in mid June.      HPI: She complains of lumbar back pain, some better now she had some chiropractic but then saw her family doctor who ordered x-rays and fractures were reported.  She has a history of seizure disorder but has not had a seizure in 7 years.  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  Is left her with some residual weakness and numbness in the left leg.    PFSH: See attached    ROS: Also has a history of congestive heart failure and has a pacemaker in place.  Has had 3 blood clots and required placement of a venous filter.    PE: On exam EHL weakness on the left some numbness in the left leg.  She wears a brace for an unstable left knee.  The back is mildly tender at lumbosacral palpation and posture appears unremarkable.    XRAY: Plain film x-rays show chronic fractures of T12 L2 and 3 and a well-balanced significant scoliosis.  A vena caval filter is noted in place.  No comparison views are available but report of the x-rays obtained at [facility] 6 weeks ago sounds very similar.    Other: n/a    Impression: Lumbar scoliosis, lumbar disc degeneration and probable remote fractures from previous seizures.  In any event there is been no deterioration from the report of June which I retrieved online.    Plan: She will do PT and follow-up as needed.

## 2021-08-06 ENCOUNTER — READMISSION MANAGEMENT (OUTPATIENT)
Dept: CALL CENTER | Facility: HOSPITAL | Age: 51
End: 2021-08-06

## 2021-08-06 ENCOUNTER — TELEPHONE (OUTPATIENT)
Dept: FAMILY MEDICINE CLINIC | Facility: CLINIC | Age: 51
End: 2021-08-06

## 2021-08-06 ENCOUNTER — TRANSITIONAL CARE MANAGEMENT TELEPHONE ENCOUNTER (OUTPATIENT)
Dept: CALL CENTER | Facility: HOSPITAL | Age: 51
End: 2021-08-06

## 2021-08-06 RX ORDER — NABUMETONE 750 MG/1
750 TABLET, FILM COATED ORAL 2 TIMES DAILY PRN
Qty: 60 TABLET | Refills: 0 | Status: SHIPPED | OUTPATIENT
Start: 2021-08-06 | End: 2022-07-13 | Stop reason: SDUPTHER

## 2021-08-06 NOTE — OUTREACH NOTE
Prep Survey      Responses   [facility] facility patient discharged from?  Non-BH   Is LACE score < 7 ?  Non-BH Discharge   Emergency Room discharge w/ pulse ox?  No   Eligibility  [facility]   Date of Discharge  08/05/21   Discharge diagnosis  stroke   Does the patient have one of the following disease processes/diagnoses(primary or secondary)?  Stroke (TIA)   Prep survey completed?  Yes          Cristiana Mackenzie RN

## 2021-08-06 NOTE — TELEPHONE ENCOUNTER
"Records from [facility] are in care everywhere, she said shes driving and her limitations are \"minimal\"  "

## 2021-08-06 NOTE — TELEPHONE ENCOUNTER
Caller: Hanh Mederos    Relationship: Self    Best call back number:[phone]    Equipment requested: CANE AND WALKER    Reason for the request: LEFT SIDE WEAKNESS FROM STROKE    Additional information or concerns: PATIENT HAS A HOSPITAL FOLLOW UP SCHEDULED ON 8/11/2021 BUT WANTED TO KNOW IF SHE COULD GO AHEAD AND GET THE PROCESS GOING TO GET THE CANE AND WALKER SHE WILL NEED.     PLEASE CALL AND ADVISE         Surgery

## 2021-08-06 NOTE — TELEPHONE ENCOUNTER
Caller: Hanh Mederos    Relationship to patient: Self    Best call back number: [phone]    New or established patient?  [] New  [x] Established    Date of discharge: 08/05/2021    Facility discharged from: [facility]    Diagnosis/Symptoms: STROKE    Length of stay (If applicable): 3 DAY    Specialty Only: Did you see a [facility] health provider?    [] Yes  [] No

## 2021-08-06 NOTE — OUTREACH NOTE
Call Center TCM Note      Responses   [facility] patient discharged from?  Non-   Does the patient have one of the following disease processes/diagnoses(primary or secondary)?  Stroke (TIA)   TCM attempt successful?  Yes   Call start time  1322   Call end time  1327   Discharge diagnosis  stroke   Meds reviewed with patient/caregiver?  Yes   Is the patient having any side effects they believe may be caused by any medication additions or changes?  No   Does the patient have all medications ordered at discharge?  Yes   Is the patient taking all medications as directed (includes completed medication regime)?  Yes   Does the patient have a primary care provider?   Yes   Does the patient have an appointment with their PCP within 7 days of discharge?  Yes   Comments regarding PCP  Has a followup on 8/11/2021 to see Rhys Hicks    Has the patient kept scheduled appointments due by today?  N/A   Has home health visited the patient within 72 hours of discharge?  N/A   What DME was ordered?  Patient has a cane and declines needing a walker at this time.    Psychosocial issues?  No   Did the patient receive a copy of their discharge instructions?  Yes   Nursing interventions  Reviewed instructions with patient   What is the patient's perception of their health status since discharge?  Improving   Is the patient/caregiver able to teach back signs and symptoms related to disease process for when to call PCP?  Yes   Is the patient/caregiver able to teach back signs and symptoms related to disease process for when to call 911?  Yes   Is the patient/caregiver able to teach back the hierarchy of who to call/visit for symptoms/problems? PCP, Specialist, Home health nurse, Urgent Care, ED, 911  Yes   If the patient is a current smoker, are they able to teach back resources for cessation?  Not a smoker   TCM call completed?  Yes          Marquise Aponte RN    8/6/2021, 13:27 EDT

## 2021-08-11 ENCOUNTER — OFFICE VISIT (OUTPATIENT)
Dept: FAMILY MEDICINE CLINIC | Facility: CLINIC | Age: 51
End: 2021-08-11

## 2021-08-11 VITALS
HEIGHT: 67 IN | BODY MASS INDEX: 29.1 KG/M2 | DIASTOLIC BLOOD PRESSURE: 84 MMHG | WEIGHT: 185.4 LBS | HEART RATE: 91 BPM | SYSTOLIC BLOOD PRESSURE: 122 MMHG | OXYGEN SATURATION: 99 % | TEMPERATURE: 97.7 F

## 2021-08-11 DIAGNOSIS — I63.9 CEREBROVASCULAR ACCIDENT (CVA), UNSPECIFIED MECHANISM (HCC): Primary | ICD-10-CM

## 2021-08-11 DIAGNOSIS — F39 MOOD DISORDER (HCC): ICD-10-CM

## 2021-08-11 DIAGNOSIS — J02.9 SORE THROAT: ICD-10-CM

## 2021-08-11 PROBLEM — I21.9 MYOCARDIAL INFARCTION: Status: ACTIVE | Noted: 2021-08-11

## 2021-08-11 PROBLEM — I50.9 CONGESTIVE HEART FAILURE: Status: ACTIVE | Noted: 2021-08-11

## 2021-08-11 PROBLEM — I25.10 ARTERIOSCLEROSIS OF CORONARY ARTERY: Status: ACTIVE | Noted: 2021-08-11

## 2021-08-11 LAB
EXPIRATION DATE: NORMAL
INTERNAL CONTROL: NORMAL
Lab: 3708
S PYO AG THROAT QL: NEGATIVE

## 2021-08-11 PROCEDURE — 87880 STREP A ASSAY W/OPTIC: CPT | Performed by: NURSE PRACTITIONER

## 2021-08-11 PROCEDURE — 99495 TRANSJ CARE MGMT MOD F2F 14D: CPT | Performed by: NURSE PRACTITIONER

## 2021-08-11 RX ORDER — ASPIRIN 325 MG/1
325 TABLET, COATED ORAL DAILY
COMMUNITY
Start: 2021-08-05 | End: 2021-10-26 | Stop reason: SDUPTHER

## 2021-08-11 RX ORDER — ESCITALOPRAM OXALATE 5 MG/1
5 TABLET ORAL DAILY
Qty: 30 TABLET | Refills: 2 | Status: SHIPPED | OUTPATIENT
Start: 2021-08-11 | End: 2021-11-01

## 2021-08-11 NOTE — PROGRESS NOTES
Transitional Care Follow Up Visit  Subjective     Hanh Mederos is a 50 y.o. female who presents for a transitional care management visit.    Within 48 business hours after discharge our office contacted her via telephone to coordinate her care and needs.      I reviewed and discussed the details of that call along with the discharge summary, hospital problems, inpatient lab results, inpatient diagnostic studies, and consultation reports with Hanh.     Current outpatient and discharge medications have been reconciled for the patient.  Reviewed by: EMILIANA Penn      Date of TCM Phone Call 8/6/2021   [facility]   Date of Discharge 8/5/2021     Risk for Readmission (LACE) No data recorded    History of Present Illness   Course During Hospital Stay:  8/3/2021-8/5/2021    This is her second stroke.   She has left leg affected, but was able to lift leg today.    She has been doing stretches and exercises at home.    Was at gas station using bathroom and had trouble with neck and then started having tunnel vision. Then had physical exhaustion of body.   Didn't have weakness at that point.  Went to come here and then we were booked so went to hospital. Then had trouble with speech once there, headache, left leg no feeling.     Had transfer to [facility]. Was there for a week   Got TPA within 2 hours and then started having recovery of symptoms.      Now just has drag in foot.  Can feel toes.      Has been doing it at home.  4-5 times daily.  Had a friend PT come out and show her what to use.   Using cane or walker.     Was able to bowl on Monday.      Is feeling lonely.   is ill. States she is feeling overwhelmed.      Having the tunnel vision today.      Cannot afford to see neurologist per her    UTI-starting on antibiotic.  Hasn't picked up yet.      Sore throat-thinks she may have strep        The following portions of the patient's history were reviewed and updated as  appropriate: allergies, current medications, past family history, past medical history, past social history, past surgical history and problem list.    Review of Systems    Objective   Physical Exam  Constitutional:       Appearance: Normal appearance.   HENT:      Head: Normocephalic and atraumatic.   Cardiovascular:      Rate and Rhythm: Normal rate and regular rhythm.   Pulmonary:      Effort: Pulmonary effort is normal.      Breath sounds: Normal breath sounds.   Musculoskeletal:      Comments: Weakness to left lower extremity.    Skin:     General: Skin is warm and dry.      Findings: Bruising present.   Neurological:      Mental Status: She is alert and oriented to person, place, and time.   Psychiatric:         Mood and Affect: Mood normal.         Behavior: Behavior normal.         Thought Content: Thought content normal.         Judgment: Judgment normal.         Assessment/Plan   Diagnoses and all orders for this visit:    1. Cerebrovascular accident (CVA), unspecified mechanism (CMS/HCC) (Primary)    2. Mood disorder (CMS/HCC)    3. Sore throat  -     POCT rapid strep A    Other orders  -     escitalopram (Lexapro) 5 MG tablet; Take 1 tablet by mouth Daily.  Dispense: 30 tablet; Refill: 2      Patient declines physical therapy as she is doing her own physical therapy at home.  I recommended this but she declines.    Sore throat negative for strep    Mood disorder we discussed options for treatment.  I recommended therapy but patient does not want at this time.  We will trial Lexapro.  If any worsening mood notify provider.  If any suicidal homicidal ideations go to the ER.  We will follow-up in 4 weeks for medication management    I discussed with patient that with bruising her platelet count is within normal limits.  She is anemic.  This could be contributing to it along with her stroke   History.  Which continue to monitor.  If continued issues I would like to refer to hematology.  She declines referral  to hematology and neurology due to cost currently

## 2021-08-16 ENCOUNTER — TELEPHONE (OUTPATIENT)
Dept: FAMILY MEDICINE CLINIC | Facility: CLINIC | Age: 51
End: 2021-08-16

## 2021-08-16 ENCOUNTER — OFFICE VISIT (OUTPATIENT)
Dept: FAMILY MEDICINE CLINIC | Facility: CLINIC | Age: 51
End: 2021-08-16

## 2021-08-16 VITALS
BODY MASS INDEX: 29.77 KG/M2 | DIASTOLIC BLOOD PRESSURE: 64 MMHG | WEIGHT: 185.2 LBS | HEART RATE: 92 BPM | OXYGEN SATURATION: 96 % | SYSTOLIC BLOOD PRESSURE: 118 MMHG | HEIGHT: 66 IN | TEMPERATURE: 98.7 F

## 2021-08-16 DIAGNOSIS — J02.9 SORE THROAT: Primary | ICD-10-CM

## 2021-08-16 LAB
EXPIRATION DATE: NORMAL
INTERNAL CONTROL: NORMAL
Lab: 3708
S PYO AG THROAT QL: NEGATIVE

## 2021-08-16 PROCEDURE — 87880 STREP A ASSAY W/OPTIC: CPT | Performed by: NURSE PRACTITIONER

## 2021-08-16 PROCEDURE — 99214 OFFICE O/P EST MOD 30 MIN: CPT | Performed by: NURSE PRACTITIONER

## 2021-08-16 RX ORDER — AZITHROMYCIN 250 MG/1
TABLET, FILM COATED ORAL
Qty: 6 TABLET | Refills: 0 | Status: SHIPPED | OUTPATIENT
Start: 2021-08-16 | End: 2021-09-08

## 2021-08-16 RX ORDER — METHYLPREDNISOLONE 4 MG/1
TABLET ORAL
Qty: 21 EACH | Refills: 0 | Status: SHIPPED | OUTPATIENT
Start: 2021-08-16 | End: 2021-09-08

## 2021-08-16 NOTE — TELEPHONE ENCOUNTER
[facility] pharmacy sent a fax on pts AZITHROMYCIN.  Stating that pt is allergic to macrolides as well as every class of antibiotics besides FLUROQUINOLES.  PLEASE ADVISE

## 2021-08-16 NOTE — PROGRESS NOTES
"Chief Complaint  Laryngitis and Cough (productive cough)    Subjective          Hanh Mederos presents to [facility]  History of Present Illness    Patient presents today with continued throat pain and productive cough.  She started to have an sore throat on Friday.  I did a follow-up with her on Friday and we checked for strep throat which was negative.  She had decreased talking and has a lot of talking to do.   Yesterday started coughing and has clear to white sputum production.        Denies fever, fatigue, loss of sense of taste or smell, shortness of breath.    She does report mucinex DM is helping.     Has been drinking viki tea and honey tea and gargled warm salt water.       Objective   Vital Signs:   /64   Pulse 92   Temp 98.7 °F (37.1 °C)   Ht 167.6 cm (66\")   Wt 84 kg (185 lb 3.2 oz)   SpO2 96%   BMI 29.89 kg/m²     Physical Exam  Constitutional:       Appearance: Normal appearance.   HENT:      Head: Normocephalic and atraumatic.      Mouth/Throat:      Mouth: Mucous membranes are moist.      Pharynx: Oropharyngeal exudate and posterior oropharyngeal erythema present.      Comments: Hoarseness to voice noted  Cardiovascular:      Rate and Rhythm: Normal rate and regular rhythm.   Pulmonary:      Effort: Pulmonary effort is normal.      Breath sounds: Normal breath sounds.   Neurological:      Mental Status: She is alert and oriented to person, place, and time.   Psychiatric:         Mood and Affect: Mood normal.         Behavior: Behavior normal.         Thought Content: Thought content normal.         Judgment: Judgment normal.        Result Review :     Strep    [facility] Labs[facility] 8/16/21   POC Strep A, Molecular Negative                     Assessment and Plan    Diagnoses and all orders for this visit:    1. Sore throat (Primary)  -     COVID-19,[facility] ROUTINE, NP/OP SWAB IN TRANSPORT MEDIA OR ESWAB 72 HR TAT - Swab, Oropharynx  -     POCT rapid strep " A    Other orders  -     methylPREDNISolone (MEDROL) 4 MG dose pack; Take as directed on package instructions.  Dispense: 21 each; Refill: 0  -     azithromycin (Zithromax) 250 MG tablet; Take 2 tablets the first day, then 1 tablet daily for 4 days.  Dispense: 6 tablet; Refill: 0      Would like to retest for strep and Covid.  Patient to start azithromycin and steroid for pharyngitis.  Will call with results any changes needed to plan of care.      Follow Up   No follow-ups on file.  Patient was given instructions and counseling regarding her condition or for health maintenance advice. Please see specific information pulled into the AVS if appropriate.

## 2021-08-17 LAB
LABCORP SARS-COV-2, NAA 2 DAY TAT: NORMAL
SARS-COV-2 RNA RESP QL NAA+PROBE: NOT DETECTED

## 2021-08-17 RX ORDER — LEVOFLOXACIN 500 MG/1
500 TABLET, FILM COATED ORAL DAILY
Qty: 7 TABLET | Refills: 0 | Status: SHIPPED | OUTPATIENT
Start: 2021-08-17 | End: 2021-09-08

## 2021-08-17 NOTE — TELEPHONE ENCOUNTER
Please tell her I'm sorry about that, we didn't have that on her allergy list, but it has been added.  New rx sent

## 2021-09-08 ENCOUNTER — OFFICE VISIT (OUTPATIENT)
Dept: FAMILY MEDICINE CLINIC | Facility: CLINIC | Age: 51
End: 2021-09-08

## 2021-09-08 VITALS
DIASTOLIC BLOOD PRESSURE: 58 MMHG | BODY MASS INDEX: 28.25 KG/M2 | SYSTOLIC BLOOD PRESSURE: 102 MMHG | HEART RATE: 92 BPM | HEIGHT: 66 IN | OXYGEN SATURATION: 98 % | TEMPERATURE: 97.8 F | WEIGHT: 175.8 LBS

## 2021-09-08 DIAGNOSIS — E55.9 VITAMIN D DEFICIENCY, UNSPECIFIED: ICD-10-CM

## 2021-09-08 DIAGNOSIS — F39 MOOD DISORDER (HCC): ICD-10-CM

## 2021-09-08 DIAGNOSIS — M54.50 ACUTE MIDLINE LOW BACK PAIN WITHOUT SCIATICA: ICD-10-CM

## 2021-09-08 DIAGNOSIS — I63.9 CEREBROVASCULAR ACCIDENT (CVA), UNSPECIFIED MECHANISM (HCC): Primary | ICD-10-CM

## 2021-09-08 DIAGNOSIS — E03.9 HYPOTHYROIDISM, UNSPECIFIED TYPE: ICD-10-CM

## 2021-09-08 DIAGNOSIS — D50.9 IRON DEFICIENCY ANEMIA, UNSPECIFIED IRON DEFICIENCY ANEMIA TYPE: ICD-10-CM

## 2021-09-08 DIAGNOSIS — M54.6 PAIN IN THORACIC SPINE: ICD-10-CM

## 2021-09-08 DIAGNOSIS — I48.91 ATRIAL FIBRILLATION, UNSPECIFIED TYPE (HCC): ICD-10-CM

## 2021-09-08 DIAGNOSIS — Z86.73 HISTORY OF STROKE: ICD-10-CM

## 2021-09-08 PROCEDURE — 99214 OFFICE O/P EST MOD 30 MIN: CPT | Performed by: NURSE PRACTITIONER

## 2021-09-08 RX ORDER — LIDOCAINE 50 MG/G
2 PATCH TOPICAL EVERY 24 HOURS
Qty: 60 EACH | Refills: 2 | Status: SHIPPED | OUTPATIENT
Start: 2021-09-08 | End: 2022-02-10

## 2021-09-08 NOTE — PROGRESS NOTES
"Chief Complaint  Abdominal Pain, Back Pain, and follow up cva    Subjective          Hanh Medeors presents to [facility]  History of Present Illness    Patient is here today for follow up on CVA, abdominal pain, and back pain.    Stomach pain, discomfort.  Has gastric bypass.  Doesn't have pain, or fullness before eating. Just has been feeling like eating less, but also isn't hungry and nothing taste the same. Has had 2 covid test this week due to [employer] work rules.   Had tarry stool the other day, but didn't think blood and has had normal BM since.     Goes tomorrow for back. Needs refills on lidoderm. Goes to PT tomorrow.      Swelling in better in left knee    Objective   Vital Signs:   /58   Pulse 92   Temp 97.8 °F (36.6 °C)   Ht 167.6 cm (66\")   Wt 79.7 kg (175 lb 12.8 oz)   SpO2 98%   BMI 28.37 kg/m²     Physical Exam  Constitutional:       Appearance: Normal appearance.   Cardiovascular:      Rate and Rhythm: Normal rate and regular rhythm.   Pulmonary:      Effort: Pulmonary effort is normal.      Breath sounds: Normal breath sounds.   Neurological:      Mental Status: She is alert and oriented to person, place, and time.   Psychiatric:         Mood and Affect: Mood normal.         Behavior: Behavior normal.         Thought Content: Thought content normal.         Judgment: Judgment normal.        Result Review :                 Assessment and Plan    Diagnoses and all orders for this visit:    1. Cerebrovascular accident (CVA), unspecified mechanism (CMS/HCC) (Primary)  -     CBC & Differential  -     Comprehensive Metabolic Panel  -     Lipid Panel  -     TSH  -     Vitamin B12  -     Vitamin D 25 Hydroxy  -     Magnesium    2. Mood disorder (CMS/HCC)  -     CBC & Differential  -     Comprehensive Metabolic Panel  -     Lipid Panel  -     TSH  -     Vitamin B12  -     Vitamin D 25 Hydroxy  -     Magnesium    3. Iron deficiency anemia, unspecified iron " deficiency anemia type  -     CBC & Differential  -     Comprehensive Metabolic Panel  -     Lipid Panel  -     TSH  -     Vitamin B12  -     Vitamin D 25 Hydroxy  -     Magnesium    4. Hypothyroidism, unspecified type  -     CBC & Differential  -     Comprehensive Metabolic Panel  -     Lipid Panel  -     TSH  -     Vitamin B12  -     Vitamin D 25 Hydroxy  -     Magnesium    5. Vitamin D deficiency, unspecified   -     CBC & Differential  -     Comprehensive Metabolic Panel  -     Lipid Panel  -     TSH  -     Vitamin B12  -     Vitamin D 25 Hydroxy  -     Magnesium    6. Atrial fibrillation, unspecified type (CMS/HCC)  -     CBC & Differential  -     Comprehensive Metabolic Panel  -     Lipid Panel  -     TSH  -     Vitamin B12  -     Vitamin D 25 Hydroxy  -     Magnesium    7. Pain in thoracic spine  -     lidocaine (Lidoderm) 5 %; Place 2 patches on the skin as directed by provider Daily. Remove & Discard patch within 12 hours or as directed by MD  Dispense: 60 each; Refill: 2  -     CBC & Differential  -     Comprehensive Metabolic Panel  -     Lipid Panel  -     TSH  -     Vitamin B12  -     Vitamin D 25 Hydroxy  -     Magnesium    8. Acute midline low back pain without sciatica  -     lidocaine (Lidoderm) 5 %; Place 2 patches on the skin as directed by provider Daily. Remove & Discard patch within 12 hours or as directed by MD  Dispense: 60 each; Refill: 2  -     CBC & Differential  -     Comprehensive Metabolic Panel  -     Lipid Panel  -     TSH  -     Vitamin B12  -     Vitamin D 25 Hydroxy  -     Magnesium      Will refill medications.  Will obtain labs and call with results any changes needed plan of care.  Keep appointment with physical therapy.  Follow-up in 3 months, sooner if needed.        Follow Up   No follow-ups on file.  Patient was given instructions and counseling regarding her condition or for health maintenance advice. Please see specific information pulled into the AVS if appropriate.

## 2021-09-09 LAB
ALBUMIN SERPL-MCNC: 4 G/DL (ref 3.8–4.9)
ALBUMIN/GLOB SERPL: 1.8 {RATIO} (ref 1.2–2.2)
ALP SERPL-CCNC: 97 IU/L (ref 48–121)
ALT SERPL-CCNC: 16 IU/L (ref 0–32)
AST SERPL-CCNC: 32 IU/L (ref 0–40)
BASOPHILS # BLD AUTO: 0 X10E3/UL (ref 0–0.2)
BASOPHILS NFR BLD AUTO: 2 %
BILIRUB SERPL-MCNC: 0.2 MG/DL (ref 0–1.2)
BUN SERPL-MCNC: 17 MG/DL (ref 6–24)
BUN/CREAT SERPL: 19 (ref 9–23)
CALCIUM SERPL-MCNC: 8.7 MG/DL (ref 8.7–10.2)
CHLORIDE SERPL-SCNC: 112 MMOL/L (ref 96–106)
CHOLEST SERPL-MCNC: 133 MG/DL (ref 100–199)
CO2 SERPL-SCNC: 19 MMOL/L (ref 20–29)
CREAT SERPL-MCNC: 0.91 MG/DL (ref 0.57–1)
EOSINOPHIL # BLD AUTO: 0.1 X10E3/UL (ref 0–0.4)
EOSINOPHIL NFR BLD AUTO: 4 %
ERYTHROCYTE [DISTWIDTH] IN BLOOD BY AUTOMATED COUNT: 13.9 % (ref 11.7–15.4)
GLOBULIN SER CALC-MCNC: 2.2 G/DL (ref 1.5–4.5)
GLUCOSE SERPL-MCNC: 85 MG/DL (ref 65–99)
HCT VFR BLD AUTO: 28.8 % (ref 34–46.6)
HDLC SERPL-MCNC: 49 MG/DL
HGB BLD-MCNC: 8.9 G/DL (ref 11.1–15.9)
IMM GRANULOCYTES # BLD AUTO: 0 X10E3/UL (ref 0–0.1)
IMM GRANULOCYTES NFR BLD AUTO: 0 %
LDLC SERPL CALC-MCNC: 69 MG/DL (ref 0–99)
LYMPHOCYTES # BLD AUTO: 0.6 X10E3/UL (ref 0.7–3.1)
LYMPHOCYTES NFR BLD AUTO: 23 %
MAGNESIUM SERPL-MCNC: 2.4 MG/DL (ref 1.6–2.3)
MCH RBC QN AUTO: 28.9 PG (ref 26.6–33)
MCHC RBC AUTO-ENTMCNC: 30.9 G/DL (ref 31.5–35.7)
MCV RBC AUTO: 94 FL (ref 79–97)
MONOCYTES # BLD AUTO: 0.2 X10E3/UL (ref 0.1–0.9)
MONOCYTES NFR BLD AUTO: 7 %
MORPHOLOGY BLD-IMP: ABNORMAL
NEUTROPHILS # BLD AUTO: 1.7 X10E3/UL (ref 1.4–7)
NEUTROPHILS NFR BLD AUTO: 64 %
PLATELET # BLD AUTO: 87 X10E3/UL (ref 150–450)
POTASSIUM SERPL-SCNC: 4.4 MMOL/L (ref 3.5–5.2)
PROT SERPL-MCNC: 6.2 G/DL (ref 6–8.5)
RBC # BLD AUTO: 3.08 X10E6/UL (ref 3.77–5.28)
SODIUM SERPL-SCNC: 144 MMOL/L (ref 134–144)
TRIGL SERPL-MCNC: 75 MG/DL (ref 0–149)
TSH SERPL DL<=0.005 MIU/L-ACNC: 0.86 UIU/ML (ref 0.45–4.5)
VIT B12 SERPL-MCNC: 1245 PG/ML (ref 232–1245)
VLDLC SERPL CALC-MCNC: 15 MG/DL (ref 5–40)
WBC # BLD AUTO: 2.7 X10E3/UL (ref 3.4–10.8)

## 2021-09-09 RX ORDER — LISINOPRIL 5 MG/1
TABLET ORAL
Qty: 90 TABLET | Refills: 0 | Status: SHIPPED | OUTPATIENT
Start: 2021-09-09 | End: 2022-01-10

## 2021-09-27 ENCOUNTER — TELEMEDICINE (OUTPATIENT)
Dept: FAMILY MEDICINE CLINIC | Facility: CLINIC | Age: 51
End: 2021-09-27

## 2021-09-27 ENCOUNTER — TELEPHONE (OUTPATIENT)
Dept: GASTROENTEROLOGY | Facility: CLINIC | Age: 51
End: 2021-09-27

## 2021-09-27 DIAGNOSIS — R10.9 ABDOMINAL CRAMPING: ICD-10-CM

## 2021-09-27 DIAGNOSIS — R10.84 GENERALIZED ABDOMINAL PAIN: Primary | ICD-10-CM

## 2021-09-27 DIAGNOSIS — Z98.890 S/P GASTRIC SURGERY: ICD-10-CM

## 2021-09-27 DIAGNOSIS — S29.012A STRAIN OF THORACIC BACK REGION: ICD-10-CM

## 2021-09-27 PROCEDURE — 99213 OFFICE O/P EST LOW 20 MIN: CPT | Performed by: NURSE PRACTITIONER

## 2021-09-27 RX ORDER — POLYETHYLENE GLYCOL 3350 17 G/17G
17 POWDER, FOR SOLUTION ORAL DAILY
COMMUNITY

## 2021-09-27 NOTE — TELEPHONE ENCOUNTER
Caller: Hanh Mederos    Relationship to patient: Self    Best call back number: [phone]    Date of exposure: WITHIN THE LAST 14 DAYS, PT DID NOT PROVIDE SPECIFIC DATE     Date of positive COVID19 test: COVID TEST TAKEN TODAY, PENDING RESULTS. PT STATES SHE SHOULD HAVE RESULTS BY APPT DATE 9/30/21.      Additional information or concerns: NOTIFYING PRACTICE OF THIS AS PT IS SCHEDULED WITH DR. HARDING 9/30/21 AT 3:30PM.

## 2021-09-27 NOTE — PROGRESS NOTES
You have chosen to receive care through a telephone visit. Do you consent to use a telephone visit for your medical care today? Yes      Chief Complaint  Abdominal Pain, exposed to covid (9/23/2021), and Constipation    Subjective          Hanh Mederos presents to [facility]  History of Present Illness     Patient is here today via tele-health video visit on [employer].  She is at her home and I am in office.  She presents with complaint of known exposure to covid on 9/23/2021 and abdominal pain and constipation since the 13th and 14th.   Was having cramping.  She states she went to ER when she got home.  States she was told she had constipation.   Has completely evacuated, but still having cramps.       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  She took over a shelter that had 8 people with COVID because that shelter wasn't compliant.      Also having nausea.  zofran helps.  Is able to eat.  Taste hasn't completely returned since having COVID in January    Denies any headache, cough, shortness of breath.       Always feels like she needs to pass gas. Denies any blood in stool.    When sleeping no pain     FINDINGS:   Abdomen:   Lung bases are unremarkable. The liver, spleen, adrenal glands, pancreas and   kidneys are suboptimally evaluated on these unenhanced images, but are without   acute findings.     There is no free air or lymph node enlargement. The abdominal aorta is not   aneurysmal. An infrarenal IVC filter is noted. Cholecystectomy clips are noted.   Sequela from bariatric surgery is noted of the upper gastrointestinal tract.     Pelvis:   There is mild distention of the colon and rectum with gas and stool. Overall, the   gastrointestinal tract and soft tissue organs of the pelvis are limited in   evaluation without the benefit of oral and IV contrast. There is no lisandra bowel   obstruction. There is no significant amount of free pelvic fluid. The urinary   bladder is  grossly unremarkable.     Skeleton:   There are no acute fractures.     IMPRESSION:     1. Somewhat limited noncontrast exam for evaluating the solid organs and   gastrointestinal tract.   2. Distention of the colon and rectum with gas and stool. No lisandra bowel   obstruction.       Objective   Vital Signs:   There were no vitals taken for this visit.    Physical Exam  Constitutional:       Appearance: Normal appearance.   Neurological:      Mental Status: She is alert and oriented to person, place, and time.   Psychiatric:         Mood and Affect: Mood normal.         Behavior: Behavior normal.         Thought Content: Thought content normal.         Judgment: Judgment normal.        Result Review :                 Assessment and Plan    Diagnoses and all orders for this visit:    1. Generalized abdominal pain (Primary)  -     COVID-19,[facility] ROUTINE, NP/OP SWAB IN TRANSPORT MEDIA OR ESWAB 72 HR TAT - Swab, Oropharynx  -     CT Abdomen Pelvis With & Without Contrast; Future  -     Ambulatory Referral to Gastroenterology    2. Abdominal cramping  -     COVID-19,[facility] ROUTINE, NP/OP SWAB IN TRANSPORT MEDIA OR ESWAB 72 HR TAT - Swab, Oropharynx  -     CT Abdomen Pelvis With & Without Contrast; Future  -     Ambulatory Referral to Gastroenterology    3. S/P gastric surgery  -     COVID-19,[facility] ROUTINE, NP/OP SWAB IN TRANSPORT MEDIA OR ESWAB 72 HR TAT - Swab, Oropharynx  -     CT Abdomen Pelvis With & Without Contrast; Future  -     Ambulatory Referral to Gastroenterology         We will attempt to obtain CT with and without contrast for further evaluation for abdominal cramping.  Patient will come in today for Covid test.  If any worsening symptoms return to ER.  She verbalized understanding.  Will refer to gastro as well for abdominal cramping and previous gastric surgery.    Time spent on visit was 20 minutes.      Follow Up   No follow-ups on file.  Patient was given instructions and counseling regarding her  condition or for health maintenance advice. Please see specific information pulled into the AVS if appropriate.

## 2021-09-28 ENCOUNTER — TELEPHONE (OUTPATIENT)
Dept: FAMILY MEDICINE CLINIC | Facility: CLINIC | Age: 51
End: 2021-09-28

## 2021-09-28 RX ORDER — GABAPENTIN 600 MG/1
TABLET ORAL
Qty: 90 TABLET | Refills: 0 | Status: SHIPPED | OUTPATIENT
Start: 2021-09-28 | End: 2022-01-28

## 2021-09-28 RX ORDER — LEVOTHYROXINE SODIUM 75 UG/1
TABLET ORAL
Qty: 90 TABLET | Refills: 0 | Status: SHIPPED | OUTPATIENT
Start: 2021-09-28 | End: 2022-01-28

## 2021-09-28 NOTE — TELEPHONE ENCOUNTER
FYI PATIENT CALLED IN SAYING SHE WAS IN EXCRUCIATING PAIN ON HER STOMACH AND FELT AS IF SHE WOULD PASS OUT FROM THE PAIN. I ADVISED HER TO GO TO THE EMERGENCY ROOM. SHE SAID SHE COULD NOT GO RIGHT NOW BUT SHE WOULD GO AFTER SHE NO LONGER HAD HER GRANDCHILD.

## 2021-09-29 ENCOUNTER — TELEPHONE (OUTPATIENT)
Dept: GASTROENTEROLOGY | Facility: CLINIC | Age: 51
End: 2021-09-29

## 2021-09-29 LAB
LABCORP SARS-COV-2, NAA 2 DAY TAT: NORMAL
SARS-COV-2 RNA RESP QL NAA+PROBE: NOT DETECTED

## 2021-09-29 NOTE — TELEPHONE ENCOUNTER
"Pt called today at 3:20 pm (in extreme distress) asking where we were located, because she was trying to make it to her 3:30 appt. I informed her that her appt is tomorrow at 3:30 pm, not today.     She then stated that she was informed by someone in our office to come in today and that we were in [place] behind a [employer], which is where the pt was at the time of this call. I informed her that no one from our office would advise her to go to [place] for an appt with us, since we are in [place].     After digging a bit in her chart, I discovered that she called her PCP's office, which is in [place], and that they instructed her to go the ER, not our office.     Again, the pt was very distressed the entire phone call stating how much pain she was in. I then instructed her to go to the ER as well. Since she was in [place], I told her that there is a [facility] hospital there. I even instructed her on how to get there. She stated that she wasn't going to go since she has been to the ER several times the past few weeks and \"they can't figure out what's wrong with her\". She said she will just wait and try to make it to her appt here with us tomorrow and then added, \"if I'm not dead by then\". She then asked for our address and I gave it to her. Then she hung up on me.   "

## 2021-09-30 ENCOUNTER — OFFICE VISIT (OUTPATIENT)
Dept: GASTROENTEROLOGY | Facility: CLINIC | Age: 51
End: 2021-09-30

## 2021-09-30 ENCOUNTER — TELEPHONE (OUTPATIENT)
Dept: GASTROENTEROLOGY | Facility: CLINIC | Age: 51
End: 2021-09-30

## 2021-09-30 VITALS
DIASTOLIC BLOOD PRESSURE: 80 MMHG | RESPIRATION RATE: 16 BRPM | TEMPERATURE: 97.6 F | SYSTOLIC BLOOD PRESSURE: 120 MMHG | HEART RATE: 97 BPM | HEIGHT: 66 IN | WEIGHT: 174 LBS | OXYGEN SATURATION: 99 % | BODY MASS INDEX: 27.97 KG/M2

## 2021-09-30 DIAGNOSIS — Z90.49 HX OF CHOLECYSTECTOMY: ICD-10-CM

## 2021-09-30 DIAGNOSIS — D51.0 PERNICIOUS ANEMIA: ICD-10-CM

## 2021-09-30 DIAGNOSIS — Z98.84 H/O BARIATRIC SURGERY: Primary | ICD-10-CM

## 2021-09-30 PROCEDURE — 99204 OFFICE O/P NEW MOD 45 MIN: CPT | Performed by: INTERNAL MEDICINE

## 2021-09-30 RX ORDER — DICYCLOMINE HCL 20 MG
20 TABLET ORAL
COMMUNITY
Start: 2021-09-28 | End: 2021-10-05

## 2021-09-30 RX ORDER — MAGNESIUM CITRATE
8.72 SOLUTION, ORAL ORAL
COMMUNITY
Start: 2021-09-28 | End: 2022-03-30

## 2021-09-30 NOTE — PROGRESS NOTES
No chief complaint on file.          History of Present Illness  50-year-old female here for consultation.  She is here for complaints of abdominal pain.  She reports that she has had pain going on for approximately 2 weeks.  Worse after eating.  She is status post gastric bypass in 2008.  She has been to the emergency room once where she reports she was unable to be seen and then went to the emergency room at [facility] yesterday complaining of pain that was 9 out of 10.  Work-up included normal blood work and CT scan.  She was noted to be somewhat constipated and this has been recommended before as a possible contributing factor.  She describes the pain as a burning sensation cramping severe  Multiple abdominal surgeries    She has had 2 CT scans 1 with contrast 1 without contrast.  She has been aggressively treating her constipation.  The symptom that bothers her the most is abdominal cramping bloating and nausea.  She has no blood in her stool.  Last EGD and colonoscopy many years ago.  The patient has had a stroke before and has had multiple surgeries due to a complicated gastric bypass surgery and multiple infections including C. difficile and MRSA.  cck  hysterectomy  Egd/colon 2013  Gastric bypass 2008  Review of Systems   multiple abdominal surgeries  Result Review :           Vital Signs:   There were no vitals taken for this visit.    There is no height or weight on file to calculate BMI.     Physical Exam  Vitals reviewed.   Constitutional:       Appearance: Normal appearance.   HENT:      Nose: No nasal deformity.   Eyes:      General: No scleral icterus.  Neck:      Comments: Trachea midline.  Cardiovascular:      Rate and Rhythm: Normal rate and regular rhythm.   Pulmonary:      Effort: No respiratory distress.      Breath sounds: Normal breath sounds.   Abdominal:      General: Bowel sounds are normal. There is no distension.      Palpations: Abdomen is soft. There is no mass.      Tenderness: There is  no abdominal tenderness.      Comments: Scarring on the abdomen mild tenderness to palpation   Lymphadenopathy:      Comments: No periumbilical lymphadenopathy.   Skin:     General: Skin is warm.   Neurological:      Mental Status: She is alert.           Assessment and Plan    Diagnoses and all orders for this visit:    1. H/O bariatric surgery (Primary)    2. Hx of cholecystectomy    3. Pernicious anemia           1.  High suspicion for small intestinal bacterial overgrowth.  Discussed test and treat strategies we are going to treat her empirically with Xifaxan 3 times a day for 2 weeks  2.  Schedule EGD and colonoscopy this is to be done at the hospital given her pacemaker history and multiple comorbidities previously  3.  To consider tubeless enteroclysis based on her clinical course and response to Xifaxan  4.  EGD will rule out anastomotic ulcer.  Patient is not on a PPI but we will consider adding this based on the response of the Xifaxan  5.  Lengthy discussion regarding expectations and her previous surgical history that we will certainly be happy to continue to work to evaluate for any potential treatable issues to improve her quality of life and symptom complex  I have reviewed and confirmed the accuracy of the HPI and Assessment and Plan as documented by the APRN Bandar Wang MD        Follow Up   No follow-ups on file.    There are no Patient Instructions on file for this visit.

## 2021-09-30 NOTE — TELEPHONE ENCOUNTER
Called patient to confirm that she had a negative covid test and that she has no covid sx.  She is asymptomatic, negative covid test and plans to keep her appt with JT today.

## 2021-10-01 ENCOUNTER — TELEPHONE (OUTPATIENT)
Dept: GASTROENTEROLOGY | Facility: CLINIC | Age: 51
End: 2021-10-01

## 2021-10-01 NOTE — TELEPHONE ENCOUNTER
Pt called and would like to know what is the name of the infection she has because her insurance is trying to get the Xifaxan approved at a cheaper cost. But, her insurance is wanting the name of infection and if there is another medication she could try that may not be as expensive. Please advise.

## 2021-10-04 RX ORDER — PANTOPRAZOLE SODIUM 40 MG/1
40 TABLET, DELAYED RELEASE ORAL DAILY
Qty: 30 TABLET | Refills: 5 | Status: SHIPPED | OUTPATIENT
Start: 2021-10-04 | End: 2022-04-26 | Stop reason: SDUPTHER

## 2021-10-04 RX ORDER — METRONIDAZOLE 250 MG/1
250 TABLET ORAL 3 TIMES DAILY
Qty: 30 TABLET | Refills: 0 | Status: SHIPPED | OUTPATIENT
Start: 2021-10-04 | End: 2022-03-02

## 2021-10-04 RX ORDER — SUCRALFATE ORAL 1 G/10ML
1 SUSPENSION ORAL 3 TIMES DAILY
Qty: 1200 ML | Refills: 1 | Status: SHIPPED | OUTPATIENT
Start: 2021-10-04 | End: 2021-10-06

## 2021-10-04 NOTE — TELEPHONE ENCOUNTER
I sent in a prescription for metronidazole to replace the Xifaxan.  Please let her know she cannot drink any alcohol while she is taking this medication.    I would also recommend we get her started on pantoprazole and Carafate.  I sent in prescriptions to her pharmacy.    For the constipation, try to increase MiraLAX up to 1 capful twice per day to see if this will help.

## 2021-10-04 NOTE — TELEPHONE ENCOUNTER
"Patient called, wanting to know what Xifaxan was ordered for. We discussed IBS and possible SIBO (she said SIBO was the name she couldn't remember), and she stated that Xifaxan is too expensive for her. She states she went to the ER on Saturday 10/2021 complaining of chest pain/epigastric pain. She said she received a \"GI cocktail\" which helped some, so it was ruled non-cardiac pain, and she was discharged home. She states the ER visit was just \"a bandaid treatment\" for what she's going through. She is currently complaining of constant sharp pain in her stomach that started last night. She reports that she vomited for the 1st time last night around 2230. She said she went to bed, got up around 0430 this morning to go to the bathroom, and \"blacked out\" from pain. Denies fever or diarrhea. She states she has not had a bowel movement in 3 days, despite taking miralax every night, stool softeners, and 2 bottles of mag citrate 3 days ago. Please advise.   "

## 2021-10-05 NOTE — TELEPHONE ENCOUNTER
APRN contacted patient today to assess symptoms. Patient is somewhat better, and her pain as a 6-7/10.  She is currently taking Flagyl.  She has not had a bowel movement in a few days.  Discussion was held with patient regarding use of a fleets saline enema if she has not had a bowel movement in the next couple of days.  Patient verbalized understand of above.  If symptoms fail to improve after she is finished with her course of Flagyl patient to contact the office for further recommendations.  Patient verbalized understand of above plan of care.  EMILIANA Choe

## 2021-10-06 ENCOUNTER — TELEPHONE (OUTPATIENT)
Dept: GASTROENTEROLOGY | Facility: CLINIC | Age: 51
End: 2021-10-06

## 2021-10-06 RX ORDER — SUCRALFATE 1 G/1
1 TABLET ORAL 2 TIMES DAILY
Qty: 60 TABLET | Refills: 1 | Status: SHIPPED | OUTPATIENT
Start: 2021-10-06 | End: 2022-01-10

## 2021-10-06 NOTE — TELEPHONE ENCOUNTER
Sucralfate suspension was sent in for this patient. Patient is requesting the tablets due to them being covered by insurance. Pharmacy verified.

## 2021-10-22 ENCOUNTER — TELEPHONE (OUTPATIENT)
Dept: FAMILY MEDICINE CLINIC | Facility: CLINIC | Age: 51
End: 2021-10-22

## 2021-10-22 DIAGNOSIS — Z12.31 ENCOUNTER FOR SCREENING MAMMOGRAM FOR MALIGNANT NEOPLASM OF BREAST: Primary | ICD-10-CM

## 2021-10-22 NOTE — TELEPHONE ENCOUNTER
Caller: Hanh Mederos    Relationship: Self    Best call back number: [phone]     What orders are you requesting (i.e. lab or imaging): MAMMOGRAM    In what timeframe would the patient need to come in: ASAP    Where will you receive your lab/imaging services: [facility]    Additional notes: PLEASE CALL PATIENT TO SCHEDULE WHEN ORDER IS IN

## 2021-10-25 ENCOUNTER — TRANSCRIBE ORDERS (OUTPATIENT)
Dept: ADMINISTRATIVE | Facility: HOSPITAL | Age: 51
End: 2021-10-25

## 2021-10-25 DIAGNOSIS — Z12.31 BREAST CANCER SCREENING BY MAMMOGRAM: Primary | ICD-10-CM

## 2021-10-26 RX ORDER — ASPIRIN 325 MG/1
325 TABLET, COATED ORAL DAILY
Qty: 90 TABLET | Refills: 0 | Status: SHIPPED | OUTPATIENT
Start: 2021-10-26 | End: 2022-01-28

## 2021-11-01 ENCOUNTER — OFFICE VISIT (OUTPATIENT)
Dept: FAMILY MEDICINE CLINIC | Facility: CLINIC | Age: 51
End: 2021-11-01

## 2021-11-01 ENCOUNTER — TELEPHONE (OUTPATIENT)
Dept: FAMILY MEDICINE CLINIC | Facility: CLINIC | Age: 51
End: 2021-11-01

## 2021-11-01 VITALS
SYSTOLIC BLOOD PRESSURE: 122 MMHG | WEIGHT: 177 LBS | DIASTOLIC BLOOD PRESSURE: 74 MMHG | HEIGHT: 66 IN | TEMPERATURE: 96.4 F | HEART RATE: 91 BPM | OXYGEN SATURATION: 100 % | BODY MASS INDEX: 28.45 KG/M2

## 2021-11-01 DIAGNOSIS — E55.9 VITAMIN D DEFICIENCY, UNSPECIFIED: ICD-10-CM

## 2021-11-01 DIAGNOSIS — M25.562 ACUTE PAIN OF LEFT KNEE: ICD-10-CM

## 2021-11-01 DIAGNOSIS — D50.9 IRON DEFICIENCY ANEMIA, UNSPECIFIED IRON DEFICIENCY ANEMIA TYPE: ICD-10-CM

## 2021-11-01 DIAGNOSIS — Z00.00 MEDICARE ANNUAL WELLNESS VISIT, SUBSEQUENT: Primary | ICD-10-CM

## 2021-11-01 DIAGNOSIS — Z86.73 HISTORY OF STROKE: ICD-10-CM

## 2021-11-01 DIAGNOSIS — L85.3 DRY SKIN: ICD-10-CM

## 2021-11-01 DIAGNOSIS — I48.91 ATRIAL FIBRILLATION, UNSPECIFIED TYPE (HCC): ICD-10-CM

## 2021-11-01 DIAGNOSIS — F39 MOOD DISORDER (HCC): ICD-10-CM

## 2021-11-01 DIAGNOSIS — E03.9 HYPOTHYROIDISM, UNSPECIFIED TYPE: ICD-10-CM

## 2021-11-01 PROCEDURE — 1170F FXNL STATUS ASSESSED: CPT | Performed by: NURSE PRACTITIONER

## 2021-11-01 PROCEDURE — 96160 PT-FOCUSED HLTH RISK ASSMT: CPT | Performed by: NURSE PRACTITIONER

## 2021-11-01 PROCEDURE — 1159F MED LIST DOCD IN RCRD: CPT | Performed by: NURSE PRACTITIONER

## 2021-11-01 PROCEDURE — G0439 PPPS, SUBSEQ VISIT: HCPCS | Performed by: NURSE PRACTITIONER

## 2021-11-01 RX ORDER — LEVOTHYROXINE SODIUM 0.07 MG/1
75 TABLET ORAL
COMMUNITY
Start: 2021-08-03 | End: 2022-04-26 | Stop reason: SDUPTHER

## 2021-11-01 RX ORDER — ESCITALOPRAM OXALATE 10 MG/1
10 TABLET ORAL DAILY
Qty: 90 TABLET | Refills: 1 | Status: SHIPPED | OUTPATIENT
Start: 2021-11-01 | End: 2022-04-26 | Stop reason: SDUPTHER

## 2021-11-01 RX ORDER — AMMONIUM LACTATE 12 G/100G
LOTION TOPICAL 2 TIMES DAILY
Qty: 225 G | Refills: 1 | Status: SHIPPED | OUTPATIENT
Start: 2021-11-01 | End: 2022-06-15 | Stop reason: SDUPTHER

## 2021-11-01 NOTE — PROGRESS NOTES
The ABCs of the Annual Wellness Visit  Subsequent Medicare Wellness Visit    Chief Complaint   Patient presents with   • Med Refill   • Knee Pain   • Annual Exam   • Hypothyroidism      Subjective    History of Present Illness:  Hanh Mederos is a 50 y.o. female who presents for a Subsequent Medicare Wellness Visit.    Left knee pain-hurts when bent for too long.  Hurts with walking more than 250 feet.  Having fluid on knee.  Hurt it on deployment.  Twisted it.    Has been hurting for few months, but has increasingly gotten worse.    Wearing knee brace helps.  Would like to stay here in [place] for ortho    Having back pain.  Spring cleaned and moved all furniture in back.     Having cramps again in left leg.      Labs 9/8/2021    Had check on pacemaker recently    Mood: feels better with lexapro.  Does want to increase because she is snappy.  Denies any side effects to medication    The following portions of the patient's history were reviewed and   updated as appropriate: allergies, current medications, past family history, past medical history, past social history, past surgical history and problem list.    Compared to one year ago, the patient feels her physical   health is worse.    Compared to one year ago, the patient feels her mental   health is worse.    Recent Hospitalizations:  She was not admitted to the hospital during the last year.       Current Medical Providers:  Patient Care Team:  Rhys Estrada APRN as PCP - General (Family Medicine)  Bandar Wang MD as Consulting Physician (Gastroenterology)    Outpatient Medications Prior to Visit   Medication Sig Dispense Refill   • EQ Aspirin 325 MG tablet Take 1 tablet by mouth Daily. 90 tablet 0   • Euthyrox 75 MCG tablet Take 1 tablet by mouth once daily 90 tablet 0   • ferrous sulfate 325 (65 FE) MG tablet Take 325 mg by mouth Daily With Breakfast.     • levothyroxine (SYNTHROID, LEVOTHROID) 75 MCG tablet 75 mcg.     • lisinopril  (PRINIVIL,ZESTRIL) 5 MG tablet Take 1 tablet by mouth once daily 90 tablet 0   • MULTIPLE VITAMINS-MINERALS ER PO Take  by mouth.     • naloxone (Narcan) 4 MG/0.1ML nasal spray 1 spray into the nostril(s) as directed by provider As Needed (overdose). 2 each 1   • oxybutynin (DITROPAN) 5 MG tablet Take 1 tablet by mouth 4 (Four) Times a Day. (Patient taking differently: Take 5 mg by mouth 1 (One) Time.) 120 tablet 2   • pantoprazole (PROTONIX) 40 MG EC tablet Take 1 tablet by mouth Daily. 30 tablet 5   • tiZANidine (ZANAFLEX) 4 MG tablet Take 1 tablet by mouth Every 8 (Eight) Hours As Needed for Muscle Spasms. 30 tablet 0   • topiramate (TOPAMAX) 100 MG tablet Take 100 mg by mouth Daily.     • traZODone (DESYREL) 50 MG tablet Take 50 mg by mouth Daily.     • vitamin C (ASCORBIC ACID) 500 MG tablet Take 500 mg by mouth Daily.     • escitalopram (Lexapro) 5 MG tablet Take 1 tablet by mouth Daily. 30 tablet 2   • gabapentin (NEURONTIN) 300 MG capsule Take 300 mg by mouth.     • gabapentin (NEURONTIN) 600 MG tablet TAKE 1 TABLET BY MOUTH THREE TIMES DAILY AS NEEDED FOR PAIN 90 tablet 0   • lidocaine (Lidoderm) 5 % Place 2 patches on the skin as directed by provider Daily. Remove & Discard patch within 12 hours or as directed by MD 60 each 2   • magnesium citrate solution 8.725 g.     • metroNIDAZOLE (FLAGYL) 250 MG tablet Take 1 tablet by mouth 3 (Three) Times a Day. 30 tablet 0   • nabumetone (Relafen) 750 MG tablet Take 1 tablet by mouth 2 (Two) Times a Day As Needed for Mild Pain  or Moderate Pain . 60 tablet 0   • polyethylene glycol (MiraLax) 17 g packet Take 17 g by mouth Daily.     • sucralfate (CARAFATE) 1 g tablet Take 1 tablet by mouth 2 (Two) Times a Day. 60 tablet 1     Facility-Administered Medications Prior to Visit   Medication Dose Route Frequency Provider Last Rate Last Admin   • iron dextran complex (INFED) IM injection 25 mg  25 mg Intramuscular Daily Rhys Estrada APRN   50 mg at 02/11/21 3025  "      No opioid medication identified on active medication list. I have reviewed chart for other potential  high risk medication/s and harmful drug interactions in the elderly.          Aspirin is on active medication list. Aspirin use is indicated based on review of current medical condition/s. Pros and cons of this therapy have been discussed today. Benefits of this medication outweigh potential harm.  Patient has been encouraged to continue taking this medication.  .      Patient Active Problem List   Diagnosis   • Atrial fibrillation (HCC)   • Chest pain   • CVA (cerebral vascular accident) (HCC)   • H/O bariatric surgery   • Hx of cholecystectomy   • Hypothyroidism   • Presence of permanent cardiac pacemaker   • Seizures (HCC)   • History of DVT (deep vein thrombosis)   • History of pulmonary embolus (PE)   • Hypertension   • Pernicious anemia   • Sick sinus syndrome due to SA node dysfunction (HCC)   • Pacemaker at end of battery life   • Pacemaker   • Seizure disorder (HCC)   • Arteriosclerosis of coronary artery   • Congestive heart failure (HCC)   • Constipation   • Myocardial infarction (HCC)   • Nausea and vomiting     Advance Care Planning  Advance Directive is not on file.  ACP discussion was held with the patient during this visit. Patient does not have an advance directive, information provided.          Objective    Vitals:    11/01/21 0824 11/01/21 0842   BP: 122/74    Pulse: 91    Temp: 96.4 °F (35.8 °C)    SpO2: 100%    Weight: 80.6 kg (177 lb 12.8 oz) 80.3 kg (177 lb)   Height: 66 cm (25.98\") 167.6 cm (66\")     BMI Readings from Last 1 Encounters:   11/01/21 28.57 kg/m²   BMI is above normal parameters. Recommendations include: educational material    Does the patient have evidence of cognitive impairment? No    Physical Exam  Constitutional:       Appearance: Normal appearance.   Musculoskeletal:      Left knee: Swelling present. Tenderness present.   Skin:     General: Skin is warm and dry. "   Neurological:      Mental Status: She is alert and oriented to person, place, and time.   Psychiatric:         Mood and Affect: Mood normal.         Behavior: Behavior normal.         Thought Content: Thought content normal.         Judgment: Judgment normal.       Lab Results   Component Value Date    CHLPL 133 09/08/2021    TRIG 75 09/08/2021    HDL 49 09/08/2021    LDL 69 09/08/2021    VLDL 15 09/08/2021            HEALTH RISK ASSESSMENT    Smoking Status:  Social History     Tobacco Use   Smoking Status Never Smoker   Smokeless Tobacco Never Used     Alcohol Consumption:  Social History     Substance and Sexual Activity   Alcohol Use Never     Fall Risk Screen:    STEADI Fall Risk Assessment has not been completed.    Depression Screening:  PHQ-2/PHQ-9 Depression Screening 8/5/2020   Little interest or pleasure in doing things 0   Feeling down, depressed, or hopeless 0   Trouble falling or staying asleep, or sleeping too much 0   Feeling tired or having little energy 0   Poor appetite or overeating 0   Feeling bad about yourself - or that you are a failure or have let yourself or your family down 0   Trouble concentrating on things, such as reading the newspaper or watching television 0   Moving or speaking so slowly that other people could have noticed. Or the opposite - being so fidgety or restless that you have been moving around a lot more than usual 0   Thoughts that you would be better off dead, or of hurting yourself in some way 0   Total Score 0       Health Habits and Functional and Cognitive Screening:  Functional & Cognitive Status 11/1/2021   Do you have difficulty preparing food and eating? No   Do you have difficulty bathing yourself, getting dressed or grooming yourself? No   Do you have difficulty using the toilet? No   Do you have difficulty moving around from place to place? No   Do you have trouble with steps or getting out of a bed or a chair? No   Current Diet Well Balanced Diet   Dental  Exam Up to date   Eye Exam Up to date   Exercise (times per week) 4 times per week   Current Exercises Include Walking   Do you need help using the phone?  No   Are you deaf or do you have serious difficulty hearing?  No   Do you need help with transportation? No   Do you need help shopping? No   Do you need help preparing meals?  No   Do you need help with housework?  No   Do you need help with laundry? No   Do you need help taking your medications? No   Do you need help managing money? No   Do you ever drive or ride in a car without wearing a seat belt? No   Have you felt unusual stress, anger or loneliness in the last month? No   Who do you live with? Spouse   If you need help, do you have trouble finding someone available to you? No   Have you been bothered in the last four weeks by sexual problems? No   Do you have difficulty concentrating, remembering or making decisions? No       Age-appropriate Screening Schedule:  Refer to the list below for future screening recommendations based on patient's age, sex and/or medical conditions. Orders for these recommended tests are listed in the plan section. The patient has been provided with a written plan.    Health Maintenance   Topic Date Due   • ZOSTER VACCINE (1 of 2) 11/05/2021 (Originally 11/11/2019)   • TDAP/TD VACCINES (1 - Tdap) 11/11/2021 (Originally 11/11/1988)   • MAMMOGRAM  11/25/2022   • INFLUENZA VACCINE  Completed   • PAP SMEAR  Discontinued              Assessment/Plan   CMS Preventative Services Quick Reference  Risk Factors Identified During Encounter  Dementia/Memory   Depression/Dysphoria  Fall Risk-High or Moderate  Obesity/Overweight      Memory-patient has no issues with memory.  We will repeat screening yearly  Depression well controlled.  We are increasing dose today.-  Fall risk-patient is low risk  Overweight-discussed with patient the need for weight loss to BMI between 18 and 25.  This can be achieved with reduction of calories and carbs in  diet and inclusion of exercise.    Knee pain-referral made to orthopedist for further evaluation    Mood-increasing Lexapro to 10 mg daily.    Patient is scheduled for January for colonoscopy.    Continue medications.  Follow-up in 3 months, sooner if needed.  The above risks/problems have been discussed with the patient.  Follow up actions/plans if indicated are seen below in the Assessment/Plan Section.  Pertinent information has been shared with the patient in the After Visit Summary.    Diagnoses and all orders for this visit:    1. Medicare annual wellness visit, subsequent (Primary)    2. Acute pain of left knee  -     Ambulatory Referral to Orthopedic Surgery    3. Dry skin    4. Mood disorder (HCC)    5. Iron deficiency anemia, unspecified iron deficiency anemia type    6. Hypothyroidism, unspecified type    7. Vitamin D deficiency, unspecified     8. Atrial fibrillation, unspecified type (HCC)    9. History of stroke    Other orders  -     escitalopram (Lexapro) 10 MG tablet; Take 1 tablet by mouth Daily.  Dispense: 90 tablet; Refill: 1  -     ammonium lactate (AmLactin) 12 % lotion; Apply  topically to the appropriate area as directed 2 (Two) Times a Day.  Dispense: 225 g; Refill: 1        Follow Up:   No follow-ups on file.     An After Visit Summary and PPPS were made available to the patient.

## 2021-11-15 RX ORDER — OXYBUTYNIN CHLORIDE 5 MG/1
TABLET ORAL
Qty: 120 TABLET | Refills: 0 | Status: SHIPPED | OUTPATIENT
Start: 2021-11-15 | End: 2022-01-10

## 2021-11-22 ENCOUNTER — LAB (OUTPATIENT)
Dept: FAMILY MEDICINE CLINIC | Facility: CLINIC | Age: 51
End: 2021-11-22

## 2021-11-22 VITALS — TEMPERATURE: 97.2 F

## 2021-11-23 LAB — 25(OH)D3+25(OH)D2 SERPL-MCNC: 35.2 NG/ML (ref 30–100)

## 2021-11-30 ENCOUNTER — TELEPHONE (OUTPATIENT)
Dept: GASTROENTEROLOGY | Facility: CLINIC | Age: 51
End: 2021-11-30

## 2021-11-30 NOTE — TELEPHONE ENCOUNTER
The patient returned our call  is going to see her cardiologist 12/8/21.She has to do this prior to being cleared for her procedure. Confirmed with patient

## 2022-01-10 RX ORDER — OXYBUTYNIN CHLORIDE 5 MG/1
TABLET ORAL
Qty: 120 TABLET | Refills: 0 | Status: SHIPPED | OUTPATIENT
Start: 2022-01-10 | End: 2022-03-30

## 2022-01-10 RX ORDER — LISINOPRIL 5 MG/1
TABLET ORAL
Qty: 90 TABLET | Refills: 0 | Status: SHIPPED | OUTPATIENT
Start: 2022-01-10 | End: 2022-03-02

## 2022-01-10 RX ORDER — SUCRALFATE 1 G/1
TABLET ORAL
Qty: 60 TABLET | Refills: 0 | Status: SHIPPED | OUTPATIENT
Start: 2022-01-10 | End: 2022-02-10

## 2022-01-19 ENCOUNTER — TRANSCRIBE ORDERS (OUTPATIENT)
Dept: ADMINISTRATIVE | Facility: HOSPITAL | Age: 52
End: 2022-01-19

## 2022-01-19 DIAGNOSIS — Z01.818 OTHER SPECIFIED PRE-OPERATIVE EXAMINATION: Primary | ICD-10-CM

## 2022-01-25 ENCOUNTER — TELEPHONE (OUTPATIENT)
Dept: GASTROENTEROLOGY | Facility: CLINIC | Age: 52
End: 2022-01-25

## 2022-01-25 DIAGNOSIS — Z01.818 PRE-OP TESTING: Primary | ICD-10-CM

## 2022-01-26 ENCOUNTER — LAB (OUTPATIENT)
Dept: LAB | Facility: HOSPITAL | Age: 52
End: 2022-01-26

## 2022-01-26 DIAGNOSIS — Z01.818 OTHER SPECIFIED PRE-OPERATIVE EXAMINATION: ICD-10-CM

## 2022-01-26 LAB — SARS-COV-2 ORF1AB RESP QL NAA+PROBE: NOT DETECTED

## 2022-01-26 PROCEDURE — C9803 HOPD COVID-19 SPEC COLLECT: HCPCS

## 2022-01-26 PROCEDURE — U0004 COV-19 TEST NON-CDC HGH THRU: HCPCS

## 2022-01-28 ENCOUNTER — HOSPITAL ENCOUNTER (OUTPATIENT)
Facility: HOSPITAL | Age: 52
Setting detail: HOSPITAL OUTPATIENT SURGERY
Discharge: HOME | End: 2022-01-28
Attending: INTERNAL MEDICINE | Admitting: INTERNAL MEDICINE

## 2022-01-28 ENCOUNTER — ANESTHESIA EVENT (OUTPATIENT)
Dept: GASTROENTEROLOGY | Facility: HOSPITAL | Age: 52
End: 2022-01-28

## 2022-01-28 ENCOUNTER — ANESTHESIA (OUTPATIENT)
Dept: GASTROENTEROLOGY | Facility: HOSPITAL | Age: 52
End: 2022-01-28

## 2022-01-28 VITALS
WEIGHT: 174 LBS | HEIGHT: 69 IN | HEART RATE: 63 BPM | OXYGEN SATURATION: 100 % | RESPIRATION RATE: 16 BRPM | SYSTOLIC BLOOD PRESSURE: 135 MMHG | DIASTOLIC BLOOD PRESSURE: 93 MMHG | BODY MASS INDEX: 25.77 KG/M2

## 2022-01-28 DIAGNOSIS — S29.012A STRAIN OF THORACIC BACK REGION: ICD-10-CM

## 2022-01-28 DIAGNOSIS — Z90.49 HX OF CHOLECYSTECTOMY: ICD-10-CM

## 2022-01-28 DIAGNOSIS — D51.0 PERNICIOUS ANEMIA: ICD-10-CM

## 2022-01-28 DIAGNOSIS — Z98.84 H/O BARIATRIC SURGERY: ICD-10-CM

## 2022-01-28 PROCEDURE — 43239 EGD BIOPSY SINGLE/MULTIPLE: CPT | Performed by: INTERNAL MEDICINE

## 2022-01-28 PROCEDURE — G0121 COLON CA SCRN NOT HI RSK IND: HCPCS | Performed by: INTERNAL MEDICINE

## 2022-01-28 PROCEDURE — 88312 SPECIAL STAINS GROUP 1: CPT | Performed by: INTERNAL MEDICINE

## 2022-01-28 PROCEDURE — 88305 TISSUE EXAM BY PATHOLOGIST: CPT | Performed by: INTERNAL MEDICINE

## 2022-01-28 PROCEDURE — 25010000002 PROPOFOL 10 MG/ML EMULSION: Performed by: ANESTHESIOLOGY

## 2022-01-28 RX ORDER — GABAPENTIN 600 MG/1
TABLET ORAL
Qty: 90 TABLET | Refills: 0 | Status: SHIPPED | OUTPATIENT
Start: 2022-01-28 | End: 2022-03-02 | Stop reason: DRUGHIGH

## 2022-01-28 RX ORDER — PROPOFOL 10 MG/ML
VIAL (ML) INTRAVENOUS CONTINUOUS PRN
Status: DISCONTINUED | OUTPATIENT
Start: 2022-01-28 | End: 2022-01-28 | Stop reason: SURG

## 2022-01-28 RX ORDER — LEVOTHYROXINE SODIUM 0.07 MG/1
TABLET ORAL
Qty: 90 TABLET | Refills: 0 | Status: SHIPPED | OUTPATIENT
Start: 2022-01-28 | End: 2022-02-10 | Stop reason: SDUPTHER

## 2022-01-28 RX ORDER — PROPOFOL 10 MG/ML
VIAL (ML) INTRAVENOUS AS NEEDED
Status: DISCONTINUED | OUTPATIENT
Start: 2022-01-28 | End: 2022-01-28 | Stop reason: SURG

## 2022-01-28 RX ORDER — GABAPENTIN 300 MG/1
300 CAPSULE ORAL NIGHTLY
COMMUNITY
End: 2022-04-26 | Stop reason: SDUPTHER

## 2022-01-28 RX ORDER — SODIUM CHLORIDE, SODIUM LACTATE, POTASSIUM CHLORIDE, CALCIUM CHLORIDE 600; 310; 30; 20 MG/100ML; MG/100ML; MG/100ML; MG/100ML
30 INJECTION, SOLUTION INTRAVENOUS CONTINUOUS PRN
Status: DISCONTINUED | OUTPATIENT
Start: 2022-01-28 | End: 2022-01-28 | Stop reason: HOSPADM

## 2022-01-28 RX ORDER — LIDOCAINE HYDROCHLORIDE 20 MG/ML
INJECTION, SOLUTION INFILTRATION; PERINEURAL AS NEEDED
Status: DISCONTINUED | OUTPATIENT
Start: 2022-01-28 | End: 2022-01-28 | Stop reason: SURG

## 2022-01-28 RX ORDER — ASPIRIN 325 MG/1
TABLET, COATED ORAL
Qty: 90 TABLET | Refills: 0 | Status: SHIPPED | OUTPATIENT
Start: 2022-01-28 | End: 2022-04-26 | Stop reason: SDUPTHER

## 2022-01-28 RX ADMIN — Medication 200 MCG/KG/MIN: at 10:46

## 2022-01-28 RX ADMIN — PROPOFOL 30 MG: 10 INJECTION, EMULSION INTRAVENOUS at 11:05

## 2022-01-28 RX ADMIN — SODIUM CHLORIDE, POTASSIUM CHLORIDE, SODIUM LACTATE AND CALCIUM CHLORIDE 30 ML/HR: 600; 310; 30; 20 INJECTION, SOLUTION INTRAVENOUS at 10:39

## 2022-01-28 RX ADMIN — PROPOFOL 50 MG: 10 INJECTION, EMULSION INTRAVENOUS at 11:04

## 2022-01-28 RX ADMIN — PROPOFOL 150 MG: 10 INJECTION, EMULSION INTRAVENOUS at 10:46

## 2022-01-28 RX ADMIN — LIDOCAINE HYDROCHLORIDE 60 MG: 20 INJECTION, SOLUTION INFILTRATION; PERINEURAL at 10:46

## 2022-01-28 RX ADMIN — PROPOFOL 20 MG: 10 INJECTION, EMULSION INTRAVENOUS at 11:06

## 2022-01-28 NOTE — ANESTHESIA PREPROCEDURE EVALUATION
Anesthesia Evaluation     Patient summary reviewed   no history of anesthetic complications:  NPO Solid Status: > 8 hours  NPO Liquid Status: > 2 hours           Airway   Mallampati: II  TM distance: >3 FB  Neck ROM: full  No difficulty expected  Dental - normal exam     Pulmonary     breath sounds clear to auscultation  (+) a smoker Former,   (-) shortness of breath, recent URI  Cardiovascular     Rhythm: regular  Rate: normal    (+) pacemaker pacemaker interrogated <1 month ago, hypertension, past MI (2003)  >12 months, CAD, dysrhythmias (SSS) Atrial Fib, CHF ,       Neuro/Psych  (+) seizures, CVA, psychiatric history,     GI/Hepatic/Renal/Endo    (+)   thyroid problem hypothyroidism  (-)  obesity, no renal disease    ROS Comment: Hx gastric bypass    Musculoskeletal     (+) back pain, chronic pain,   Abdominal    Substance History      OB/GYN          Other                      Anesthesia Plan    ASA 4     MAC       Anesthetic plan, all risks, benefits, and alternatives have been provided, discussed and informed consent has been obtained with: patient.        CODE STATUS:

## 2022-01-28 NOTE — ANESTHESIA POSTPROCEDURE EVALUATION
"Patient: Hanh Mederos    Procedure Summary     Date: 01/28/22 Room / Location:  [facility]    Anesthesia Start: 1040 Anesthesia Stop: 1132    Procedures:       COLONOSCOPY (N/A )      ESOPHAGOGASTRODUODENOSCOPY WITH BIOPSIES (N/A Esophagus) Diagnosis:       H/O bariatric surgery      Hx of cholecystectomy      Pernicious anemia      (H/O bariatric surgery [Z98.84])      (Hx of cholecystectomy [Z90.49])      (Pernicious anemia [D51.0])    Surgeons: Bandar Wang MD Provider: Jesse Davis DO    Anesthesia Type: MAC ASA Status: 4          Anesthesia Type: MAC    Vitals  No vitals data found for the desired time range.          Post Anesthesia Care and Evaluation    Patient location during evaluation: bedside  Patient participation: waiting for patient participation  Level of consciousness: awake  Pain management: adequate  Airway patency: patent  Anesthetic complications: No anesthetic complications  PONV Status: NA  Cardiovascular status: acceptable and hemodynamically stable  Respiratory status: acceptable, spontaneous ventilation and nonlabored ventilation  Hydration status: acceptable    Comments: /88 (BP Location: Left arm, Patient Position: Lying)   Pulse 70   Resp 16   Ht 175.3 cm (69\")   Wt 78.9 kg (174 lb)   LMP  (LMP Unknown)   SpO2 100%   BMI 25.70 kg/m²       "

## 2022-01-29 RX ORDER — TOPIRAMATE 100 MG/1
100 TABLET, FILM COATED ORAL NIGHTLY
Qty: 90 TABLET | Refills: 0 | Status: SHIPPED | OUTPATIENT
Start: 2022-01-29 | End: 2022-04-26 | Stop reason: SDUPTHER

## 2022-01-29 RX ORDER — TRAZODONE HYDROCHLORIDE 50 MG/1
50 TABLET ORAL NIGHTLY
Qty: 90 TABLET | Refills: 0 | Status: SHIPPED | OUTPATIENT
Start: 2022-01-29 | End: 2022-04-26 | Stop reason: SDUPTHER

## 2022-02-01 LAB
LAB AP CASE REPORT: NORMAL
PATH REPORT.ADDENDUM SPEC: NORMAL
PATH REPORT.FINAL DX SPEC: NORMAL
PATH REPORT.GROSS SPEC: NORMAL

## 2022-02-02 DIAGNOSIS — K56.609 INTESTINAL OBSTRUCTION, UNSPECIFIED CAUSE, UNSPECIFIED WHETHER PARTIAL OR COMPLETE: Primary | ICD-10-CM

## 2022-02-10 ENCOUNTER — TELEMEDICINE (OUTPATIENT)
Dept: FAMILY MEDICINE CLINIC | Facility: CLINIC | Age: 52
End: 2022-02-10

## 2022-02-10 DIAGNOSIS — R42 DIZZINESS: ICD-10-CM

## 2022-02-10 DIAGNOSIS — D50.9 IRON DEFICIENCY ANEMIA, UNSPECIFIED IRON DEFICIENCY ANEMIA TYPE: ICD-10-CM

## 2022-02-10 DIAGNOSIS — E55.9 VITAMIN D DEFICIENCY, UNSPECIFIED: Primary | ICD-10-CM

## 2022-02-10 DIAGNOSIS — E03.9 HYPOTHYROIDISM, UNSPECIFIED TYPE: ICD-10-CM

## 2022-02-10 PROCEDURE — 99213 OFFICE O/P EST LOW 20 MIN: CPT | Performed by: NURSE PRACTITIONER

## 2022-02-10 RX ORDER — MECLIZINE HYDROCHLORIDE 25 MG/1
25 TABLET ORAL 3 TIMES DAILY PRN
Qty: 30 TABLET | Refills: 0 | Status: SHIPPED | OUTPATIENT
Start: 2022-02-10 | End: 2022-07-13 | Stop reason: SDUPTHER

## 2022-02-10 RX ORDER — PROMETHAZINE HYDROCHLORIDE 25 MG/1
25 TABLET ORAL EVERY 6 HOURS PRN
Qty: 30 TABLET | Refills: 0 | Status: SHIPPED | OUTPATIENT
Start: 2022-02-10 | End: 2022-04-25 | Stop reason: SDUPTHER

## 2022-02-10 NOTE — PROGRESS NOTES
Mode of Visit: Video  Location of patient: home  You have chosen to receive care through a telehealth visit.  Does the patient consent to use a video/audio connection for your medical care today? Yes  The visit included audio and video interaction. No technical issues occurred during this visit.     Chief Complaint  Nausea (3 negative covid test last one last saturday) and Dizziness    Subjective          Hanh Mederos presents to [facility]  History of Present Illness     Patient is here today via telehealth video visit.  She reports she started a couple days ago with nausea and dizziness.   She started monitoring what she was eating and not getting up too quick.     She has had 4 covid test.  All negative.     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     Lips are very dry and chapped after egd and colonoscopy on 1/28/2022.    Hasn't been staying hydrated recently     in the hospital.   Eating habits are still same.     Hasn't eaten much out.     Has still been able to eat ok.   Every since egd/colonoscopy has had gas.     Will have another because couldn't get cleaned out    OTC: nothing    Took protonix.  Hasn't ever had carafate    Denies any stomach pain.          Objective   Vital Signs:   There were no vitals taken for this visit.    Physical Exam   Constitutional: She appears well-developed and well-nourished.   Psychiatric: She has a normal mood and affect.     Result Review :                 Assessment and Plan    Diagnoses and all orders for this visit:    1. Vitamin D deficiency, unspecified (Primary)  -     CBC & Differential; Future  -     Comprehensive Metabolic Panel; Future  -     Lipid Panel; Future  -     TSH; Future  -     Vitamin B12; Future  -     Vitamin D 25 Hydroxy; Future  -     Ferritin; Future  -     Iron and TIBC; Future    2. Hypothyroidism, unspecified type  -     CBC & Differential; Future  -      Comprehensive Metabolic Panel; Future  -     Lipid Panel; Future  -     TSH; Future  -     Vitamin B12; Future  -     Vitamin D 25 Hydroxy; Future  -     Ferritin; Future  -     Iron and TIBC; Future    3. Iron deficiency anemia, unspecified iron deficiency anemia type  -     CBC & Differential; Future  -     Comprehensive Metabolic Panel; Future  -     Lipid Panel; Future  -     TSH; Future  -     Vitamin B12; Future  -     Vitamin D 25 Hydroxy; Future  -     Ferritin; Future  -     Iron and TIBC; Future    4. Dizziness  -     CBC & Differential; Future  -     Comprehensive Metabolic Panel; Future  -     Lipid Panel; Future  -     TSH; Future  -     Vitamin B12; Future  -     Vitamin D 25 Hydroxy; Future  -     Ferritin; Future  -     Iron and TIBC; Future    Other orders  -     promethazine (PHENERGAN) 25 MG tablet; Take 1 tablet by mouth Every 6 (Six) Hours As Needed for Nausea or Vomiting.  Dispense: 30 tablet; Refill: 0  -     meclizine (ANTIVERT) 25 MG tablet; Take 1 tablet by mouth 3 (Three) Times a Day As Needed for Dizziness.  Dispense: 30 tablet; Refill: 0      Patient to start Phenergan and meclizine as needed.  Will obtain labs.  She is coming in tomorrow for these labs and urinalysis.  I told her I am concerned about her being anemic.  She verbalized understanding    Time spent on visit was 14 minutes      Follow Up {Instructions Charge Capture  Follow-up Communications :23}  No follow-ups on file.  Patient was given instructions and counseling regarding her condition or for health maintenance advice. Please see specific information pulled into the AVS if appropriate.

## 2022-03-02 ENCOUNTER — OFFICE VISIT (OUTPATIENT)
Dept: FAMILY MEDICINE CLINIC | Facility: CLINIC | Age: 52
End: 2022-03-02

## 2022-03-02 VITALS
HEART RATE: 85 BPM | HEIGHT: 66 IN | OXYGEN SATURATION: 99 % | TEMPERATURE: 97.5 F | BODY MASS INDEX: 28.25 KG/M2 | DIASTOLIC BLOOD PRESSURE: 86 MMHG | WEIGHT: 175.8 LBS | SYSTOLIC BLOOD PRESSURE: 132 MMHG

## 2022-03-02 DIAGNOSIS — I10 PRIMARY HYPERTENSION: Primary | ICD-10-CM

## 2022-03-02 PROCEDURE — 99214 OFFICE O/P EST MOD 30 MIN: CPT | Performed by: NURSE PRACTITIONER

## 2022-03-02 RX ORDER — LISINOPRIL 10 MG/1
10 TABLET ORAL DAILY
Qty: 90 TABLET | Refills: 1 | Status: SHIPPED | OUTPATIENT
Start: 2022-03-02 | End: 2022-04-26 | Stop reason: SDUPTHER

## 2022-03-02 NOTE — PROGRESS NOTES
"Chief Complaint  Hypertension and Headache    Subjective          Hanh Mederos presents to [facility]  History of Present Illness    Patient is here today for complaint of elevated blood pressure.  She states that she and her  of 35-years had split on February 13.  She has been having increasing blood pressure as she is worried about financial issues with payment for expenses.  She states she doesn't feel depressed, is just worried about that.      Objective   Vital Signs:   /86   Pulse 85   Temp 97.5 °F (36.4 °C)   Ht 167.6 cm (66\")   Wt 79.7 kg (175 lb 12.8 oz)   SpO2 99%   BMI 28.37 kg/m²     Physical Exam  Constitutional:       Appearance: Normal appearance.   Neurological:      Mental Status: She is alert and oriented to person, place, and time.   Psychiatric:         Mood and Affect: Mood is anxious.         Speech: Speech is rapid and pressured.         Behavior: Behavior normal.         Thought Content: Thought content normal.         Cognition and Memory: Cognition normal.         Judgment: Judgment normal.        Result Review :                 Assessment and Plan    Diagnoses and all orders for this visit:    1. Primary hypertension (Primary)    Other orders  -     lisinopril (PRINIVIL,ZESTRIL) 10 MG tablet; Take 1 tablet by mouth Daily.  Dispense: 90 tablet; Refill: 1      We will increase lisinopril to 10 mg.  If any issues with increased or continued elevation of blood pressure notify provider.  She verbalizes understanding.  If any other issues with mood or anxiety as she is going through process of divorce please follow-up with me.  She verbalizes understanding.    Time spent on visit was 32 minutes      Follow Up   No follow-ups on file.  Patient was given instructions and counseling regarding her condition or for health maintenance advice. Please see specific information pulled into the AVS if appropriate.       "

## 2022-03-30 ENCOUNTER — TELEPHONE (OUTPATIENT)
Dept: FAMILY MEDICINE CLINIC | Facility: CLINIC | Age: 52
End: 2022-03-30

## 2022-03-30 ENCOUNTER — OFFICE VISIT (OUTPATIENT)
Dept: FAMILY MEDICINE CLINIC | Facility: CLINIC | Age: 52
End: 2022-03-30

## 2022-03-30 VITALS
HEART RATE: 86 BPM | TEMPERATURE: 96.3 F | OXYGEN SATURATION: 99 % | BODY MASS INDEX: 25.33 KG/M2 | WEIGHT: 171 LBS | HEIGHT: 69 IN | DIASTOLIC BLOOD PRESSURE: 84 MMHG | SYSTOLIC BLOOD PRESSURE: 126 MMHG

## 2022-03-30 DIAGNOSIS — F41.9 ANXIETY: Primary | ICD-10-CM

## 2022-03-30 PROCEDURE — 99213 OFFICE O/P EST LOW 20 MIN: CPT | Performed by: NURSE PRACTITIONER

## 2022-03-30 RX ORDER — OXYBUTYNIN CHLORIDE 5 MG/1
TABLET ORAL
Qty: 120 TABLET | Refills: 0 | Status: CANCELLED | OUTPATIENT
Start: 2022-03-30

## 2022-03-30 RX ORDER — SUCRALFATE 1 G/1
TABLET ORAL
Qty: 60 TABLET | Refills: 0 | Status: SHIPPED | OUTPATIENT
Start: 2022-03-30 | End: 2022-04-26 | Stop reason: SDUPTHER

## 2022-03-30 RX ORDER — LISINOPRIL 5 MG/1
TABLET ORAL
Qty: 90 TABLET | Refills: 0 | Status: SHIPPED | OUTPATIENT
Start: 2022-03-30 | End: 2022-04-25 | Stop reason: DRUGHIGH

## 2022-03-30 RX ORDER — OXYBUTYNIN CHLORIDE 5 MG/1
TABLET ORAL
Qty: 120 TABLET | Refills: 0 | Status: SHIPPED | OUTPATIENT
Start: 2022-03-30 | End: 2022-04-26 | Stop reason: SDUPTHER
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
## 2022-03-30 NOTE — TELEPHONE ENCOUNTER
Caller: Hanh Mederos    Relationship to patient: Self    Best call back number: [phone]    Chief complaint: A LOT OF CONFUSION-FORGETTING THINGS, MISSING TURNS AND EXITS WHEN DRIVING    Type of visit: OFFICE    Requested date: ASAP    If rescheduling, when is the original appointment: 4/7    Additional notes: PLEASE CALL TO ADVISE IF NATALIE CAN SEE HER SOONER.

## 2022-04-25 ENCOUNTER — OFFICE VISIT (OUTPATIENT)
Dept: FAMILY MEDICINE CLINIC | Facility: CLINIC | Age: 52
End: 2022-04-25

## 2022-04-25 VITALS
SYSTOLIC BLOOD PRESSURE: 122 MMHG | DIASTOLIC BLOOD PRESSURE: 80 MMHG | OXYGEN SATURATION: 100 % | HEART RATE: 70 BPM | HEIGHT: 69 IN | BODY MASS INDEX: 25.18 KG/M2 | TEMPERATURE: 96.6 F | WEIGHT: 170 LBS

## 2022-04-25 DIAGNOSIS — S42.302S: ICD-10-CM
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
## 2022-04-25 DIAGNOSIS — R11.0 NAUSEA: ICD-10-CM

## 2022-04-25 DIAGNOSIS — H65.02 ACUTE SEROUS OTITIS MEDIA OF LEFT EAR, RECURRENCE NOT SPECIFIED: Primary | ICD-10-CM

## 2022-04-25 PROCEDURE — 99214 OFFICE O/P EST MOD 30 MIN: CPT | Performed by: NURSE PRACTITIONER

## 2022-04-25 RX ORDER — TRAMADOL HYDROCHLORIDE 50 MG/1
50 TABLET ORAL EVERY 6 HOURS PRN
Qty: 30 TABLET | Refills: 0 | Status: SHIPPED | OUTPATIENT
Start: 2022-04-25 | End: 2022-07-13 | Stop reason: SDUPTHER

## 2022-04-25 RX ORDER — CIPROFLOXACIN AND DEXAMETHASONE 3; 1 MG/ML; MG/ML
4 SUSPENSION/ DROPS AURICULAR (OTIC) 2 TIMES DAILY
Qty: 7.5 ML | Refills: 0 | Status: SHIPPED | OUTPATIENT
Start: 2022-04-25 | End: 2022-05-06

## 2022-04-25 RX ORDER — OXYCODONE HYDROCHLORIDE 5 MG/1
TABLET ORAL
COMMUNITY
Start: 2022-04-19 | End: 2023-03-27

## 2022-04-25 RX ORDER — PROMETHAZINE HYDROCHLORIDE 25 MG/1
25 TABLET ORAL EVERY 6 HOURS PRN
Qty: 30 TABLET | Refills: 0 | Status: SHIPPED | OUTPATIENT
Start: 2022-04-25

## 2022-04-25 NOTE — PROGRESS NOTES
"Chief Complaint  Earache (Left ear)    Subjective          Hanh Mederos presents to [facility]  History of Present Illness     Patient is here today with complaint of left ear pain that started last week.  Got water in it and then started crunching and then noticed pain.       Also had yearly check and based on risk factors was placed at risk of PAD and told to discuss with me.      Been having some increased nausea with pain.  Needs refills on Phenergan.  Denies any issues with medication.    Also asking for discussion on pain management.  She was given 15 oxycodone in ER.  States she has 14 left but does not want to take something that strong.  Reports ibuprofen is not controlling pain.    Objective   Vital Signs:   /80   Pulse 70   Temp 96.6 °F (35.9 °C)   Ht 175.3 cm (69\")   Wt 77.1 kg (170 lb)   SpO2 100%   BMI 25.10 kg/m²            Physical Exam  Constitutional:       Appearance: Normal appearance.   HENT:      Head: Normocephalic and atraumatic.      Right Ear: Tympanic membrane is injected and erythematous. Tympanic membrane has decreased mobility.      Left Ear: Tympanic membrane has decreased mobility.   Cardiovascular:      Rate and Rhythm: Normal rate and regular rhythm.      Pulses: Normal pulses.   Pulmonary:      Effort: Pulmonary effort is normal.      Breath sounds: Normal breath sounds.   Skin:     General: Skin is warm and dry.   Neurological:      Mental Status: She is alert.   Psychiatric:         Mood and Affect: Mood normal.         Behavior: Behavior normal.         Thought Content: Thought content normal.         Judgment: Judgment normal.        Result Review :                 Assessment and Plan    Diagnoses and all orders for this visit:    1. Acute serous otitis media of left ear, recurrence not specified (Primary)    2. Fall, subsequent encounter  -     traMADol (ULTRAM) 50 MG tablet; Take 1 tablet by mouth Every 6 (Six) Hours As Needed for " Moderate Pain .  Dispense: 30 tablet; Refill: 0    3. Sequelae of closed fracture of left upper extremity  -     traMADol (ULTRAM) 50 MG tablet; Take 1 tablet by mouth Every 6 (Six) Hours As Needed for Moderate Pain .  Dispense: 30 tablet; Refill: 0    4. Nausea    Other orders  -     ciprofloxacin-dexamethasone (Ciprodex) 0.3-0.1 % otic suspension; Administer 4 drops into the left ear 2 (Two) Times a Day.  Dispense: 7.5 mL; Refill: 0  -     promethazine (PHENERGAN) 25 MG tablet; Take 1 tablet by mouth Every 6 (Six) Hours As Needed for Nausea or Vomiting.  Dispense: 30 tablet; Refill: 0      Due to multiple allergies will treat ear infection with Ciprodex.  If no resolution notify provider    For fall and multiple arm fractures will give tramadol for as needed use.  Instructed to use with Tylenol.  Instructed not to use with oxycodone.  Follow-up as needed    Nausea-we will give Phenergan for as needed use.  Follow-up as needed.      Follow Up   No follow-ups on file.  Patient was given instructions and counseling regarding her condition or for health maintenance advice. Please see specific information pulled into the AVS if appropriate.

## 2022-04-26 DIAGNOSIS — S32.020A COMPRESSION FRACTURE OF L2, CLOSED, INITIAL ENCOUNTER: Primary | ICD-10-CM

## 2022-04-27 RX ORDER — GABAPENTIN 300 MG/1
300 CAPSULE ORAL NIGHTLY
Qty: 90 CAPSULE | Refills: 0 | Status: SHIPPED | OUTPATIENT
Start: 2022-04-27 | End: 2022-07-13 | Stop reason: SDUPTHER

## 2022-04-27 RX ORDER — PANTOPRAZOLE SODIUM 40 MG/1
40 TABLET, DELAYED RELEASE ORAL DAILY
Qty: 90 TABLET | Refills: 0 | Status: SHIPPED | OUTPATIENT
Start: 2022-04-27 | End: 2022-07-13 | Stop reason: SDUPTHER

## 2022-04-27 RX ORDER — ASPIRIN 325 MG/1
325 TABLET, COATED ORAL DAILY
Qty: 90 TABLET | Refills: 0 | Status: SHIPPED | OUTPATIENT
Start: 2022-04-27 | End: 2022-07-13 | Stop reason: SDUPTHER

## 2022-04-27 RX ORDER — SUCRALFATE 1 G/1
1 TABLET ORAL 2 TIMES DAILY
Qty: 180 TABLET | Refills: 0 | Status: SHIPPED | OUTPATIENT
Start: 2022-04-27 | End: 2022-07-13 | Stop reason: SDUPTHER

## 2022-04-27 RX ORDER — TOPIRAMATE 100 MG/1
100 TABLET, FILM COATED ORAL NIGHTLY
Qty: 90 TABLET | Refills: 0 | Status: SHIPPED | OUTPATIENT
Start: 2022-04-27 | End: 2022-07-13 | Stop reason: SDUPTHER

## 2022-04-27 RX ORDER — OXYBUTYNIN CHLORIDE 5 MG/1
5 TABLET ORAL 4 TIMES DAILY
Qty: 120 TABLET | Refills: 0 | Status: SHIPPED | OUTPATIENT
Start: 2022-04-27 | End: 2022-07-13 | Stop reason: SDUPTHER

## 2022-04-27 RX ORDER — LISINOPRIL 10 MG/1
10 TABLET ORAL DAILY
Qty: 90 TABLET | Refills: 1 | Status: SHIPPED | OUTPATIENT
Start: 2022-04-27 | End: 2022-07-13 | Stop reason: SDUPTHER

## 2022-04-27 RX ORDER — TRAZODONE HYDROCHLORIDE 50 MG/1
50 TABLET ORAL NIGHTLY
Qty: 90 TABLET | Refills: 0 | Status: SHIPPED | OUTPATIENT
Start: 2022-04-27 | End: 2022-07-13 | Stop reason: SDUPTHER

## 2022-04-27 RX ORDER — ESCITALOPRAM OXALATE 10 MG/1
10 TABLET ORAL DAILY
Qty: 90 TABLET | Refills: 1 | Status: SHIPPED | OUTPATIENT
Start: 2022-04-27 | End: 2022-07-13 | Stop reason: SDUPTHER

## 2022-04-27 RX ORDER — LEVOTHYROXINE SODIUM 0.07 MG/1
75 TABLET ORAL DAILY
Qty: 90 TABLET | Refills: 0 | Status: SHIPPED | OUTPATIENT
Start: 2022-04-27 | End: 2022-07-13 | Stop reason: SDUPTHER

## 2022-05-06 ENCOUNTER — OFFICE VISIT (OUTPATIENT)
Dept: FAMILY MEDICINE CLINIC | Facility: CLINIC | Age: 52
End: 2022-05-06

## 2022-05-06 VITALS
HEART RATE: 98 BPM | BODY MASS INDEX: 25.3 KG/M2 | HEIGHT: 69 IN | WEIGHT: 170.8 LBS | DIASTOLIC BLOOD PRESSURE: 84 MMHG | SYSTOLIC BLOOD PRESSURE: 126 MMHG | TEMPERATURE: 96.9 F | OXYGEN SATURATION: 98 %

## 2022-05-06 DIAGNOSIS — J30.9 ALLERGIC RHINITIS, UNSPECIFIED SEASONALITY, UNSPECIFIED TRIGGER: Primary | ICD-10-CM

## 2022-05-06 DIAGNOSIS — Z78.0 POST-MENOPAUSAL: ICD-10-CM

## 2022-05-06 PROCEDURE — 99213 OFFICE O/P EST LOW 20 MIN: CPT | Performed by: NURSE PRACTITIONER
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
## 2022-05-10 DIAGNOSIS — Z78.0 POST-MENOPAUSAL: ICD-10-CM

## 2022-05-11 RX ORDER — ALENDRONATE SODIUM 70 MG/1
70 TABLET ORAL
Qty: 4 TABLET | Refills: 11 | Status: SHIPPED | OUTPATIENT
Start: 2022-05-11

## 2022-05-24 ENCOUNTER — OFFICE VISIT (OUTPATIENT)
Dept: FAMILY MEDICINE CLINIC | Facility: CLINIC | Age: 52
End: 2022-05-24

## 2022-05-24 VITALS
OXYGEN SATURATION: 99 % | TEMPERATURE: 97.5 F | HEIGHT: 69 IN | WEIGHT: 173.6 LBS | SYSTOLIC BLOOD PRESSURE: 128 MMHG | HEART RATE: 68 BPM | BODY MASS INDEX: 25.71 KG/M2 | DIASTOLIC BLOOD PRESSURE: 74 MMHG

## 2022-05-24 DIAGNOSIS — R35.0 URINE FREQUENCY: ICD-10-CM

## 2022-05-24 DIAGNOSIS — R30.0 DYSURIA: Primary | ICD-10-CM

## 2022-05-24 LAB
BILIRUB BLD-MCNC: NEGATIVE MG/DL
CLARITY, POC: CLEAR
COLOR UR: YELLOW
EXPIRATION DATE: ABNORMAL
GLUCOSE UR STRIP-MCNC: NEGATIVE MG/DL
KETONES UR QL: NEGATIVE
LEUKOCYTE EST, POC: ABNORMAL
Lab: ABNORMAL
NITRITE UR-MCNC: NEGATIVE MG/ML
PH UR: 6 [PH] (ref 5–8)
PROT UR STRIP-MCNC: ABNORMAL MG/DL
RBC # UR STRIP: NEGATIVE /UL
SP GR UR: 1.03 (ref 1–1.03)
UROBILINOGEN UR QL: NORMAL

## 2022-05-24 PROCEDURE — 99213 OFFICE O/P EST LOW 20 MIN: CPT | Performed by: FAMILY MEDICINE

## 2022-05-24 PROCEDURE — 81003 URINALYSIS AUTO W/O SCOPE: CPT | Performed by: FAMILY MEDICINE

## 2022-05-24 RX ORDER — PHENAZOPYRIDINE HYDROCHLORIDE 200 MG/1
200 TABLET, FILM COATED ORAL 3 TIMES DAILY PRN
Qty: 6 TABLET | Refills: 0 | Status: SHIPPED | OUTPATIENT
Start: 2022-05-24 | End: 2022-06-15
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
## 2022-05-30 LAB
BACTERIA UR CULT: ABNORMAL
BACTERIA UR CULT: ABNORMAL
OTHER ANTIBIOTIC SUSC ISLT: ABNORMAL

## 2022-06-15 ENCOUNTER — OFFICE VISIT (OUTPATIENT)
Dept: FAMILY MEDICINE CLINIC | Facility: CLINIC | Age: 52
End: 2022-06-15

## 2022-06-15 VITALS
TEMPERATURE: 96.6 F | SYSTOLIC BLOOD PRESSURE: 120 MMHG | HEART RATE: 72 BPM | DIASTOLIC BLOOD PRESSURE: 78 MMHG | WEIGHT: 175.2 LBS | BODY MASS INDEX: 25.95 KG/M2 | HEIGHT: 69 IN | OXYGEN SATURATION: 98 %

## 2022-06-15 DIAGNOSIS — L85.3 DRY SKIN: ICD-10-CM

## 2022-06-15 DIAGNOSIS — B35.3 TINEA PEDIS OF RIGHT FOOT: Primary | ICD-10-CM

## 2022-06-15 PROCEDURE — 99214 OFFICE O/P EST MOD 30 MIN: CPT | Performed by: NURSE PRACTITIONER

## 2022-06-15 RX ORDER — AMMONIUM LACTATE 12 G/100G
LOTION TOPICAL 2 TIMES DAILY
Qty: 225 G | Refills: 1 | Status: SHIPPED | OUTPATIENT
Start: 2022-06-15

## 2022-06-15 RX ORDER — PRENATAL VIT 91/IRON/FOLIC/DHA 28-975-200
1 COMBINATION PACKAGE (EA) ORAL 2 TIMES DAILY
Qty: 42 G | Refills: 0 | Status: SHIPPED | OUTPATIENT
Start: 2022-06-15 | End: 2022-10-18

## 2022-06-15 NOTE — PROGRESS NOTES
"Chief Complaint  Foot Problem (Fungus on right foot/)    Subjective        Hanh Mederos presents to [facility]  History of Present Illness    Patient is here today with complaint of fungal infection on right foot.  She first noticed 3-4 days ago with itching.  States it only has issue with dryness.  Only gets pedicure a couple times yearly.        Tried goldbond cream and helped a little with itching.     Also asking for paperwork for service dog.  Had one signed off on 5 years ago for her and needs updated.    Is certified.   She states she has paperwork from this and will bring in.      Objective   Vital Signs:  /78   Pulse 72   Temp 96.6 °F (35.9 °C)   Ht 175.3 cm (69\")   Wt 79.5 kg (175 lb 3.2 oz)   SpO2 98%   BMI 25.87 kg/m²   Estimated body mass index is 25.87 kg/m² as calculated from the following:    Height as of this encounter: 175.3 cm (69\").    Weight as of this encounter: 79.5 kg (175 lb 3.2 oz).          Physical Exam  Constitutional:       Appearance: Normal appearance.   Skin:     General: Skin is warm and dry.      Findings: Rash (Rash with dry skin noted on right foot.) present.   Neurological:      Mental Status: She is alert and oriented to person, place, and time.   Psychiatric:         Mood and Affect: Mood normal.         Behavior: Behavior normal.         Thought Content: Thought content normal.         Judgment: Judgment normal.        Result Review :                Assessment and Plan   Diagnoses and all orders for this visit:    1. Tinea pedis of right foot (Primary)    2. Dry skin    Other orders  -     terbinafine (Athletes Foot, Terbinafine,) 1 % cream; Apply 1 application topically to the appropriate area as directed 2 (Two) Times a Day.  Dispense: 42 g; Refill: 0  -     ammonium lactate (AmLactin) 12 % lotion; Apply  topically to the appropriate area as directed 2 (Two) Times a Day.  Dispense: 225 g; Refill: 1      Will treat for tinea " pedis of right foot.  Once rash is gone away may start using AmLactin for dry skin.  Follow-up as needed    I will consider filling out form for service animal.  However, I would like her to bring record of service dog as she states she has it in her records.  She verbalized understanding.         Follow Up   No follow-ups on file.  Patient was given instructions and counseling regarding her condition or for health maintenance advice. Please see specific information pulled into the AVS if appropriate.

## 2022-07-13 ENCOUNTER — OFFICE VISIT (OUTPATIENT)
Dept: FAMILY MEDICINE CLINIC | Facility: CLINIC | Age: 52
End: 2022-07-13

## 2022-07-13 VITALS
OXYGEN SATURATION: 99 % | DIASTOLIC BLOOD PRESSURE: 85 MMHG | TEMPERATURE: 96 F | HEIGHT: 69 IN | BODY MASS INDEX: 24.94 KG/M2 | SYSTOLIC BLOOD PRESSURE: 126 MMHG | HEART RATE: 85 BPM | WEIGHT: 168.4 LBS

## 2022-07-13 DIAGNOSIS — M54.6 PAIN IN THORACIC SPINE: ICD-10-CM

## 2022-07-13 DIAGNOSIS — M79.674 TOE PAIN, RIGHT: Primary | ICD-10-CM

## 2022-07-13 DIAGNOSIS — S42.302S: ICD-10-CM

## 2022-07-13 DIAGNOSIS — S32.020A COMPRESSION FRACTURE OF L2, CLOSED, INITIAL ENCOUNTER: ICD-10-CM

## 2022-07-13 DIAGNOSIS — M79.674 TOE PAIN, RIGHT: ICD-10-CM
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
## 2022-07-13 DIAGNOSIS — M54.50 ACUTE MIDLINE LOW BACK PAIN WITHOUT SCIATICA: ICD-10-CM

## 2022-07-13 DIAGNOSIS — F43.9 STRESS: ICD-10-CM

## 2022-07-13 DIAGNOSIS — R07.81 RIB PAIN ON RIGHT SIDE: ICD-10-CM

## 2022-07-13 PROCEDURE — 99214 OFFICE O/P EST MOD 30 MIN: CPT | Performed by: NURSE PRACTITIONER

## 2022-07-13 RX ORDER — LISINOPRIL 10 MG/1
10 TABLET ORAL DAILY
Qty: 90 TABLET | Refills: 1 | Status: SHIPPED | OUTPATIENT
Start: 2022-07-13

## 2022-07-13 RX ORDER — TOPIRAMATE 100 MG/1
100 TABLET, FILM COATED ORAL NIGHTLY
Qty: 90 TABLET | Refills: 1 | Status: SHIPPED | OUTPATIENT
Start: 2022-07-13 | End: 2023-01-17 | Stop reason: SDUPTHER

## 2022-07-13 RX ORDER — TRAMADOL HYDROCHLORIDE 50 MG/1
50 TABLET ORAL EVERY 6 HOURS PRN
Qty: 30 TABLET | Refills: 0 | Status: SHIPPED | OUTPATIENT
Start: 2022-07-13

## 2022-07-13 RX ORDER — OXYBUTYNIN CHLORIDE 5 MG/1
5 TABLET ORAL 4 TIMES DAILY
Qty: 360 TABLET | Refills: 1 | Status: SHIPPED | OUTPATIENT
Start: 2022-07-13

## 2022-07-13 RX ORDER — ESCITALOPRAM OXALATE 10 MG/1
10 TABLET ORAL DAILY
Qty: 90 TABLET | Refills: 1 | Status: SHIPPED | OUTPATIENT
Start: 2022-07-13

## 2022-07-13 RX ORDER — TRAZODONE HYDROCHLORIDE 50 MG/1
50 TABLET ORAL NIGHTLY
Qty: 90 TABLET | Refills: 1 | Status: SHIPPED | OUTPATIENT
Start: 2022-07-13 | End: 2022-12-06

## 2022-07-13 RX ORDER — TIZANIDINE 4 MG/1
4 TABLET ORAL EVERY 8 HOURS PRN
Qty: 30 TABLET | Refills: 2 | Status: SHIPPED | OUTPATIENT
Start: 2022-07-13 | End: 2022-07-28

## 2022-07-13 RX ORDER — ASPIRIN 325 MG/1
325 TABLET, COATED ORAL DAILY
Qty: 90 TABLET | Refills: 1 | Status: SHIPPED | OUTPATIENT
Start: 2022-07-13

## 2022-07-13 RX ORDER — LEVOTHYROXINE SODIUM 0.07 MG/1
75 TABLET ORAL DAILY
Qty: 90 TABLET | Refills: 1 | Status: SHIPPED | OUTPATIENT
Start: 2022-07-13 | End: 2022-12-20 | Stop reason: SDUPTHER

## 2022-07-13 RX ORDER — MECLIZINE HYDROCHLORIDE 25 MG/1
25 TABLET ORAL 3 TIMES DAILY PRN
Qty: 30 TABLET | Refills: 2 | Status: SHIPPED | OUTPATIENT
Start: 2022-07-13 | End: 2022-07-28

## 2022-07-13 RX ORDER — NABUMETONE 750 MG/1
750 TABLET, FILM COATED ORAL 2 TIMES DAILY PRN
Qty: 60 TABLET | Refills: 2 | Status: SHIPPED | OUTPATIENT
Start: 2022-07-13

## 2022-07-13 RX ORDER — PANTOPRAZOLE SODIUM 40 MG/1
40 TABLET, DELAYED RELEASE ORAL DAILY
Qty: 90 TABLET | Refills: 1 | Status: SHIPPED | OUTPATIENT
Start: 2022-07-13 | End: 2022-12-06

## 2022-07-13 RX ORDER — GABAPENTIN 300 MG/1
300 CAPSULE ORAL NIGHTLY
Qty: 90 CAPSULE | Refills: 0 | Status: SHIPPED | OUTPATIENT
Start: 2022-07-13

## 2022-07-13 RX ORDER — SUCRALFATE 1 G/1
1 TABLET ORAL 2 TIMES DAILY
Qty: 180 TABLET | Refills: 1 | Status: SHIPPED | OUTPATIENT
Start: 2022-07-13 | End: 2022-10-11

## 2022-07-13 NOTE — TELEPHONE ENCOUNTER
Caller: Hanh Mederos ROMERO    Relationship: Self    Best call back number: [phone]    Requested Prescriptions:   Requested Prescriptions     Pending Prescriptions Disp Refills   • EQ Aspirin 325 MG tablet 90 tablet 0     Sig: Take 1 tablet by mouth Daily.   • escitalopram (Lexapro) 10 MG tablet 90 tablet 1     Sig: Take 1 tablet by mouth Daily.   • levothyroxine (SYNTHROID, LEVOTHROID) 75 MCG tablet 90 tablet 0     Sig: Take 1 tablet by mouth Daily.   • lisinopril (PRINIVIL,ZESTRIL) 10 MG tablet 90 tablet 1     Sig: Take 1 tablet by mouth Daily.   • sucralfate (CARAFATE) 1 g tablet 180 tablet 0     Sig: Take 1 tablet by mouth 2 (Two) Times a Day for 90 days.   • oxybutynin (DITROPAN) 5 MG tablet 120 tablet 0     Sig: Take 1 tablet by mouth 4 (Four) Times a Day.   • traMADol (ULTRAM) 50 MG tablet 30 tablet 0     Sig: Take 1 tablet by mouth Every 6 (Six) Hours As Needed for Moderate Pain .   • pantoprazole (PROTONIX) 40 MG EC tablet 90 tablet 0     Sig: Take 1 tablet by mouth Daily.   • topiramate (TOPAMAX) 100 MG tablet 90 tablet 0     Sig: Take 1 tablet by mouth Every Night.   • traZODone (DESYREL) 50 MG tablet 90 tablet 0     Sig: Take 1 tablet by mouth Every Night.   • gabapentin (NEURONTIN) 300 MG capsule 90 capsule 0     Sig: Take 1 capsule by mouth Every Night.   • tiZANidine (ZANAFLEX) 4 MG tablet 30 tablet 0     Sig: Take 1 tablet by mouth Every 8 (Eight) Hours As Needed for Muscle Spasms.   • nabumetone (Relafen) 750 MG tablet 60 tablet 0     Sig: Take 1 tablet by mouth 2 (Two) Times a Day As Needed for Mild Pain  or Moderate Pain .   • meclizine (ANTIVERT) 25 MG tablet 30 tablet 0     Sig: Take 1 tablet by mouth 3 (Three) Times a Day As Needed for Dizziness.        Pharmacy where request should be sent: [facility] - [address] - [phone] [facility] [phone] FX     Additional details provided by patient: TADEO WITH [facility] STATES THAT PATIENT CALLED TO [facility]  TO GET ALL OF HER MEDICATIONS SENT OVER TO THEM. IT LOOKS LIKE THERE ARE SEVERAL WITHOUT REFILLS.     Does the patient have less than a 3 day supply:  [] Yes  [] No UNKNOWN     Elidia Yepez Rep   07/13/22 10:39 EDT

## 2022-07-13 NOTE — PROGRESS NOTES
"Chief Complaint  Numbness (Left side of face about 2 weeks) and Toe Injury (Right foot/)    Subjective        Hanh Mederos presents to [facility]  History of Present Illness    Patient is here today with 2 complaints.    She reports she has been having some numbness on the left side of her face for last 2 weeks.  Cheek and down to below lip.   Had tingling and then stopped, the started again and happened 2-3 days in row. A couple days ago happen and went away. Today has happened 2-3 times.   Denies any drooping of face, memory issues, issues with FAST when testing.   Longest it feels like it last is 20-30 seconds.   Has had increased stress with move.    Wasn't dehydrated.  Has been eating same as usual.       She also reports that toe injury to her right foot that happened during move.  4th toe.  Still cannot put closed toe shoe on.   July 5.   Feels like toe swelling has gotten worse.      Right rib fracture.  Warm compresses.  Denies any shortness of breath.      Back pain with move from repeated movements and up and down.     Objective   Vital Signs:  /85   Pulse 85   Temp 96 °F (35.6 °C)   Ht 175.3 cm (69\")   Wt 76.4 kg (168 lb 6.4 oz)   SpO2 99%   BMI 24.87 kg/m²   Estimated body mass index is 24.87 kg/m² as calculated from the following:    Height as of this encounter: 175.3 cm (69\").    Weight as of this encounter: 76.4 kg (168 lb 6.4 oz).    BMI is within normal parameters. No other follow-up for BMI required.      Physical Exam  Constitutional:       Appearance: Normal appearance.   Cardiovascular:      Rate and Rhythm: Normal rate and regular rhythm.      Pulses: Normal pulses.   Pulmonary:      Effort: Pulmonary effort is normal.      Breath sounds: Normal breath sounds.   Musculoskeletal:      Comments: Tenderness right ribs with palpation and ROM, tenderness and edema noted right 4th digit of foot   Skin:     General: Skin is warm and dry.   Neurological: "      Mental Status: She is alert.   Psychiatric:         Mood and Affect: Mood normal.         Behavior: Behavior normal.         Thought Content: Thought content normal.         Judgment: Judgment normal.        Result Review :                Assessment and Plan   Diagnoses and all orders for this visit:    1. Toe pain, right (Primary)  -     XR Foot 3+ View Right; Future    2. Stress    3. Rib pain on right side      Toe pain-will further evaluate with x-ray.  Will call with results and any changes needed to plan of care     Rib pain-as long as improving and no shortness of breath, no change in treatment needed.  If any shortness of breath or worsening pain notify provider.  She verbalizes understanding.    Discussed tingling sensation seems related to stress triggers.  If any signs of stroke, discussed in office, go to ER. If any new or worsening symptoms notify provider.  She verbalizes understanding.             Follow Up   No follow-ups on file.  Patient was given instructions and counseling regarding her condition or for health maintenance advice. Please see specific information pulled into the AVS if appropriate.

## 2022-07-28 DIAGNOSIS — M54.50 ACUTE MIDLINE LOW BACK PAIN WITHOUT SCIATICA: ICD-10-CM

## 2022-07-28 DIAGNOSIS — M54.6 PAIN IN THORACIC SPINE: ICD-10-CM

## 2022-07-28 RX ORDER — TIZANIDINE 4 MG/1
TABLET ORAL
Qty: 30 TABLET | Refills: 2 | Status: SHIPPED | OUTPATIENT
Start: 2022-07-28 | End: 2023-03-27 | Stop reason: SDUPTHER

## 2022-07-28 RX ORDER — MECLIZINE HYDROCHLORIDE 25 MG/1
TABLET ORAL
Qty: 30 TABLET | Refills: 2 | Status: SHIPPED | OUTPATIENT
Start: 2022-07-28

## 2022-08-24 DIAGNOSIS — M54.50 ACUTE MIDLINE LOW BACK PAIN WITHOUT SCIATICA: ICD-10-CM

## 2022-08-24 DIAGNOSIS — M54.6 PAIN IN THORACIC SPINE: ICD-10-CM

## 2022-08-24 RX ORDER — MECLIZINE HYDROCHLORIDE 25 MG/1
TABLET ORAL
Qty: 30 TABLET | Refills: 2 | OUTPATIENT
Start: 2022-08-24

## 2022-08-24 RX ORDER — TIZANIDINE 4 MG/1
TABLET ORAL
Qty: 30 TABLET | Refills: 2 | OUTPATIENT
Start: 2022-08-24

## 2022-08-30 ENCOUNTER — HOSPITAL ENCOUNTER (EMERGENCY)
Facility: HOSPITAL | Age: 52
Discharge: HOME | End: 2022-08-30
Attending: EMERGENCY MEDICINE | Admitting: EMERGENCY MEDICINE

## 2022-08-30 ENCOUNTER — APPOINTMENT (OUTPATIENT)
Dept: GENERAL RADIOLOGY | Facility: HOSPITAL | Age: 52
End: 2022-08-30

## 2022-08-30 ENCOUNTER — APPOINTMENT (OUTPATIENT)
Dept: CT IMAGING | Facility: HOSPITAL | Age: 52
End: 2022-08-30

## 2022-08-30 VITALS
DIASTOLIC BLOOD PRESSURE: 72 MMHG | OXYGEN SATURATION: 96 % | HEIGHT: 70 IN | HEART RATE: 60 BPM | RESPIRATION RATE: 18 BRPM | WEIGHT: 172 LBS | SYSTOLIC BLOOD PRESSURE: 117 MMHG | TEMPERATURE: 98 F | BODY MASS INDEX: 24.62 KG/M2

## 2022-08-30 DIAGNOSIS — R07.9 ACUTE CHEST PAIN: Primary | ICD-10-CM

## 2022-08-30 LAB
ALBUMIN SERPL-MCNC: 4 G/DL (ref 3.5–5.2)
ALBUMIN/GLOB SERPL: 1.9 G/DL
ALP SERPL-CCNC: 137 U/L (ref 39–117)
ALT SERPL W P-5'-P-CCNC: 18 U/L (ref 1–33)
ANION GAP SERPL CALCULATED.3IONS-SCNC: 9 MMOL/L (ref 5–15)
AST SERPL-CCNC: 22 U/L (ref 1–32)
BASOPHILS # BLD AUTO: 0.03 10*3/MM3 (ref 0–0.2)
BASOPHILS NFR BLD AUTO: 0.8 % (ref 0–1.5)
BILIRUB SERPL-MCNC: 0.3 MG/DL (ref 0–1.2)
BUN SERPL-MCNC: 19 MG/DL (ref 6–20)
BUN/CREAT SERPL: 18.4 (ref 7–25)
CALCIUM SPEC-SCNC: 8.9 MG/DL (ref 8.6–10.5)
CHLORIDE SERPL-SCNC: 107 MMOL/L (ref 98–107)
CO2 SERPL-SCNC: 24 MMOL/L (ref 22–29)
CREAT SERPL-MCNC: 1.03 MG/DL (ref 0.57–1)
DEPRECATED RDW RBC AUTO: 46.2 FL (ref 37–54)
EGFRCR SERPLBLD CKD-EPI 2021: 65.6 ML/MIN/1.73
EOSINOPHIL # BLD AUTO: 0.1 10*3/MM3 (ref 0–0.4)
EOSINOPHIL NFR BLD AUTO: 2.8 % (ref 0.3–6.2)
ERYTHROCYTE [DISTWIDTH] IN BLOOD BY AUTOMATED COUNT: 14.2 % (ref 12.3–15.4)
GLOBULIN UR ELPH-MCNC: 2.1 GM/DL
GLUCOSE SERPL-MCNC: 74 MG/DL (ref 65–99)
HCT VFR BLD AUTO: 35.2 % (ref 34–46.6)
HGB BLD-MCNC: 11 G/DL (ref 12–15.9)
HOLD SPECIMEN: NORMAL
IMM GRANULOCYTES # BLD AUTO: 0.01 10*3/MM3 (ref 0–0.05)
IMM GRANULOCYTES NFR BLD AUTO: 0.3 % (ref 0–0.5)
LIPASE SERPL-CCNC: 24 U/L (ref 13–60)
LYMPHOCYTES # BLD AUTO: 0.83 10*3/MM3 (ref 0.7–3.1)
LYMPHOCYTES NFR BLD AUTO: 23.4 % (ref 19.6–45.3)
MCH RBC QN AUTO: 28 PG (ref 26.6–33)
MCHC RBC AUTO-ENTMCNC: 31.3 G/DL (ref 31.5–35.7)
MCV RBC AUTO: 89.6 FL (ref 79–97)
MONOCYTES # BLD AUTO: 0.29 10*3/MM3 (ref 0.1–0.9)
MONOCYTES NFR BLD AUTO: 8.2 % (ref 5–12)
NEUTROPHILS NFR BLD AUTO: 2.28 10*3/MM3 (ref 1.7–7)
NEUTROPHILS NFR BLD AUTO: 64.5 % (ref 42.7–76)
NRBC BLD AUTO-RTO: 0 /100 WBC (ref 0–0.2)
NT-PROBNP SERPL-MCNC: 177.8 PG/ML (ref 0–900)
PLATELET # BLD AUTO: 169 10*3/MM3 (ref 140–450)
PMV BLD AUTO: 10.2 FL (ref 6–12)
POTASSIUM SERPL-SCNC: 3.8 MMOL/L (ref 3.5–5.2)
PROT SERPL-MCNC: 6.1 G/DL (ref 6–8.5)
RBC # BLD AUTO: 3.93 10*6/MM3 (ref 3.77–5.28)
SODIUM SERPL-SCNC: 140 MMOL/L (ref 136–145)
TROPONIN T SERPL-MCNC: <0.01 NG/ML (ref 0–0.03)
TROPONIN T SERPL-MCNC: <0.01 NG/ML (ref 0–0.03)
WBC NRBC COR # BLD: 3.54 10*3/MM3 (ref 3.4–10.8)
WHOLE BLOOD HOLD COAG: NORMAL
WHOLE BLOOD HOLD SPECIMEN: NORMAL

## 2022-08-30 PROCEDURE — 71045 X-RAY EXAM CHEST 1 VIEW: CPT

## 2022-08-30 PROCEDURE — 84484 ASSAY OF TROPONIN QUANT: CPT

## 2022-08-30 PROCEDURE — 83880 ASSAY OF NATRIURETIC PEPTIDE: CPT

## 2022-08-30 PROCEDURE — 93005 ELECTROCARDIOGRAM TRACING: CPT

## 2022-08-30 PROCEDURE — 96374 THER/PROPH/DIAG INJ IV PUSH: CPT

## 2022-08-30 PROCEDURE — 99284 EMERGENCY DEPT VISIT MOD MDM: CPT

## 2022-08-30 PROCEDURE — 80053 COMPREHEN METABOLIC PANEL: CPT

## 2022-08-30 PROCEDURE — 25010000002 PROCHLORPERAZINE 10 MG/2ML SOLUTION: Performed by: EMERGENCY MEDICINE

## 2022-08-30 PROCEDURE — 25010000002 MORPHINE PER 10 MG: Performed by: EMERGENCY MEDICINE

## 2022-08-30 PROCEDURE — 70450 CT HEAD/BRAIN W/O DYE: CPT

## 2022-08-30 PROCEDURE — 83690 ASSAY OF LIPASE: CPT

## 2022-08-30 PROCEDURE — 99283 EMERGENCY DEPT VISIT LOW MDM: CPT | Performed by: INTERNAL MEDICINE

## 2022-08-30 PROCEDURE — 36415 COLL VENOUS BLD VENIPUNCTURE: CPT

## 2022-08-30 PROCEDURE — 85025 COMPLETE CBC W/AUTO DIFF WBC: CPT

## 2022-08-30 PROCEDURE — 96375 TX/PRO/DX INJ NEW DRUG ADDON: CPT

## 2022-08-30 RX ORDER — MORPHINE SULFATE 4 MG/ML
4 INJECTION, SOLUTION INTRAMUSCULAR; INTRAVENOUS ONCE
Status: COMPLETED | OUTPATIENT
Start: 2022-08-30 | End: 2022-08-30

## 2022-08-30 RX ORDER — PROCHLORPERAZINE EDISYLATE 5 MG/ML
10 INJECTION INTRAMUSCULAR; INTRAVENOUS ONCE
Status: COMPLETED | OUTPATIENT
Start: 2022-08-30 | End: 2022-08-30

## 2022-08-30 RX ORDER — SODIUM CHLORIDE 0.9 % (FLUSH) 0.9 %
10 SYRINGE (ML) INJECTION AS NEEDED
Status: DISCONTINUED | OUTPATIENT
Start: 2022-08-30 | End: 2022-08-30 | Stop reason: HOSPADM

## 2022-08-30 RX ORDER — NITROGLYCERIN 0.4 MG/1
0.4 TABLET SUBLINGUAL
Status: DISCONTINUED | OUTPATIENT
Start: 2022-08-30 | End: 2022-08-30 | Stop reason: HOSPADM

## 2022-08-30 RX ORDER — ASPIRIN 81 MG/1
324 TABLET, CHEWABLE ORAL ONCE
Status: DISCONTINUED | OUTPATIENT
Start: 2022-08-30 | End: 2022-08-30

## 2022-08-30 RX ADMIN — PROCHLORPERAZINE EDISYLATE 10 MG: 5 INJECTION INTRAMUSCULAR; INTRAVENOUS at 09:33

## 2022-08-30 RX ADMIN — MORPHINE SULFATE 4 MG: 4 INJECTION, SOLUTION INTRAMUSCULAR; INTRAVENOUS at 09:33

## 2022-09-01 ENCOUNTER — PATIENT OUTREACH (OUTPATIENT)
Dept: CASE MANAGEMENT | Facility: OTHER | Age: 52
End: 2022-09-01

## 2022-09-01 NOTE — OUTREACH NOTE
Patient Outreach    AMBULATORY CASE MANAGEMENT NOTE    Name and Relationship of Patient/Support Person:  -     Call placed to patient to follow up recent Ed visit. Introduced self and role of ACM. She states she is feeling better. She is back to work today. She was able to make a follow up appointment with her PCP office. She is very appreciative of the care at [facility] and ask that this be sent to the ED staff. No questions, concerns or needs regarding health wellness. Pt given number for [facility]. Pt appreciative of phone call and declined to participate in  Case Management Program. No needs identified.         PEMA SHARIF  Ambulatory Case Management    9/1/2022, 15:21 EDT

## 2022-09-02 LAB
QT INTERVAL: 382 MS
QT INTERVAL: 448 MS
QTC INTERVAL: 418 MS
QTC INTERVAL: 454 MS

## 2022-09-07 ENCOUNTER — OFFICE VISIT (OUTPATIENT)
Dept: FAMILY MEDICINE CLINIC | Facility: CLINIC | Age: 52
End: 2022-09-07

## 2022-09-07 VITALS
HEART RATE: 85 BPM | HEIGHT: 70 IN | DIASTOLIC BLOOD PRESSURE: 70 MMHG | BODY MASS INDEX: 24.08 KG/M2 | WEIGHT: 168.2 LBS | OXYGEN SATURATION: 100 % | TEMPERATURE: 96.9 F | SYSTOLIC BLOOD PRESSURE: 108 MMHG

## 2022-09-07 DIAGNOSIS — R25.2 LEG CRAMPS: ICD-10-CM

## 2022-09-07 DIAGNOSIS — M25.541 JOINT PAIN IN FINGERS OF RIGHT HAND: ICD-10-CM

## 2022-09-07 DIAGNOSIS — M25.641 STIFFNESS OF FINGER JOINT OF RIGHT HAND: Primary | ICD-10-CM

## 2022-09-07 PROCEDURE — 99214 OFFICE O/P EST MOD 30 MIN: CPT | Performed by: FAMILY MEDICINE

## 2022-09-07 RX ORDER — PREDNISONE 20 MG/1
40 TABLET ORAL DAILY
Qty: 10 TABLET | Refills: 0 | Status: SHIPPED | OUTPATIENT
Start: 2022-09-07 | End: 2022-10-18

## 2022-09-07 NOTE — PROGRESS NOTES
"Chief Complaint  Hospital Follow Up Visit (Chest pains ) and Hand Pain (Right hand hurts to bad to open it in the morning )    Subjective        Hanh Mederos presents to [facility]  History of Present Illness     Patient of Rhys Estrada is here today for the following reasons:    Right hand pain. She is also following up after a visit to the Emergency Room for chest pains. 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 She notes that she is a cardiac patient, therefore her cardiologist may wish to refer back to her records.     Left sided musculoskeletal pain  She states that she experienced pain in her left shoulder blade and describes this as being intense during dinnertime. She notes that this subsided later on in the evening. The following morning, she experienced a recurrence of pain and describes this as being located on the \"front of her shoulder blade\". She states that the pain migrated to the left side of her neck, the left side of her face, and the upper portion of her left arm. She states she then went to the ER and work up there ruled out an MI. She reports that her symptoms have not returned.  ER felt her sx's were musculoskeletal.    Hand pain, right greater than left  She states that she is experiencing pain and decreased mobility in her right hand. She reports that as the day progresses, the movement improves, but the pain does not decrease in intensity. She states that she is experiencing this mildly in her left hand, but her right hand is significantly worse. She states that she has consistent pain in her right hand and her hand is stiff and drawn up every morning. She states she is unable to move it. She notes this does occur intermittently with her left hand as well. She reports the possibility of having a spider bite on her left hand, as she points out an area on her hand, noting there were baby spiders in her home office recently. She " "reports that when her right hand is stiff and drawn up in the morning, the pain is so severe that she feels as if she may pass out if someone were to try and straighten the hand out. She notes that she is able to close her hand without issue when it is stiff, however. She denies experiencing any noticeable swelling of her joints or heat in her hands. She states it takes hours for her to be able to move her hand. She uses hot water and an \"Australian cream\". She states she has tried to apply ice for 3 to 4 days, but this was ineffective. She reports that she also tried to apply heat for 3 to 4 days as well, but found this to be ineffective as well. She notes that it is currently hurting and a portion of her hand feels as if it may be bruised. She feels that this is arthritic in nature. She rates her pain intensity as a 6/10 or 7/10 at its worst, consistently to be a 2/10 or 3/10, but notes that after approximately 9:00 PM, the pain will be at a 0.5/10 or 1/10. She states that this was not progressive, noting that she awoke one morning with these symptoms and they have yet to resolve. However, she states that the location of the pain and stiffness progressed from one digit to eventually the other digits in the right hand. She notes that she is ambidextrous, therefore she does not use one hand more than the other, but primarily will write with her right hand. She states that she was deployed with [employer] and was performing a significant amount of typing and writing, however did not do a lot of this in the days prior to her symptoms starting. She notes that she typically does not take Tylenol, but feels that her symptoms have become severe enough that she was taking Tylenol during deployment. She notes she has received steroid injections to her knee in the past and tolerated this well. She is not currently taking any blood thinners, but notes she does take an aspirin daily.     Arthritis in arm and hip  The patient " "reports that she experienced a fall in 04/2022 and was told that she had arthritis in her arm and hip.    Bilateral leg cramps  She reports having leg cramps and is unsure if this could be attributed to her other symptoms or if she needs to hydrate more. Of note, CMP performed on 08/30/2022 was within normal limits.    Objective   Vital Signs:  /70   Pulse 85   Temp 96.9 °F (36.1 °C)   Ht 177.8 cm (70\")   Wt 76.3 kg (168 lb 3.2 oz)   SpO2 100%   BMI 24.13 kg/m²   Estimated body mass index is 24.13 kg/m² as calculated from the following:    Height as of this encounter: 177.8 cm (70\").    Weight as of this encounter: 76.3 kg (168 lb 3.2 oz).    BMI is within normal parameters. No other follow-up for BMI required.      Physical Exam  Vitals and nursing note reviewed.   Constitutional:       Appearance: She is well-developed.   HENT:      Head: Normocephalic and atraumatic.      Right Ear: External ear normal.      Left Ear: External ear normal.      Nose: Nose normal.   Eyes:      General: No scleral icterus.     Conjunctiva/sclera: Conjunctivae normal.   Pulmonary:      Effort: Pulmonary effort is normal.   Musculoskeletal:      Cervical back: Normal range of motion and neck supple.   Lymphadenopathy:      Cervical: No cervical adenopathy.   Skin:     General: Skin is warm and dry.      Findings: No rash.   Neurological:      Mental Status: She is alert and oriented to person, place, and time.   Psychiatric:         Mood and Affect: Mood normal.         Behavior: Behavior normal.         Thought Content: Thought content normal.         Judgment: Judgment normal.        Result Review :                Assessment and Plan   Diagnoses and all orders for this visit:    1. Stiffness of finger joint of right hand (Primary)  -     STEFANI by IFA, Reflex 9-biomarkers profile  -     Rheumatoid Factor, Quant  -     Uric Acid  -     Sedimentation rate, automated  -     C-reactive protein  -     predniSONE (DELTASONE) 20 " MG tablet; Take 2 tablets by mouth Daily.  Dispense: 10 tablet; Refill: 0    2. Joint pain in fingers of right hand  -     STEFANI by IFA, Reflex 9-biomarkers profile  -     Rheumatoid Factor, Quant  -     Uric Acid  -     Sedimentation rate, automated  -     C-reactive protein  -     predniSONE (DELTASONE) 20 MG tablet; Take 2 tablets by mouth Daily.  Dispense: 10 tablet; Refill: 0    3. Leg cramps  -     STEFNAI by IFA, Reflex 9-biomarkers profile  -     Rheumatoid Factor, Quant  -     Uric Acid  -     Sedimentation rate, automated  -     C-reactive protein    1. Joint pain and stiffness in fingers of right hand  · Will order full panel blood test for sed rate and CRP and to assess for lupus, rheumatoid factor, and uric acid.   · Advised to avoid applying heat. Encouraged to use ice.   · Will prescribe a course of prednisone 40 mg for 5 days to see if she gets any benefit from this. Blood pressure is well controlled, patient is not on blood thinners, and has no aversion to steroids. Discussed that she can start prednisone today or wait until we get laboratory results. Advised to avoid taking close to bedtime and to take with food. Instructed to take 2 pills with breakfast in the morning every day.   · Labs were obtained today and will follow up with the patient once the results are back. If sx's do not improve after treatment follow up         Follow Up   No follow-ups on file.  Patient was given instructions and counseling regarding her condition or for health maintenance advice. Please see specific information pulled into the AVS if appropriate.     Transcribed from ambient dictation for Estee Sawyer DO by [employer] .  09/07/22   17:13 EDT    Patient verbalized consent to the visit recording.

## 2022-09-13 ENCOUNTER — TELEPHONE (OUTPATIENT)
Dept: FAMILY MEDICINE CLINIC | Facility: CLINIC | Age: 52
End: 2022-09-13

## 2022-09-13 NOTE — TELEPHONE ENCOUNTER
Pharmacy Name: [facility] - [place], [place] - [address] - [phone]  - [phone]     Pharmacy representative phone number: [phone]    What question does the pharmacy have: PLEASE SEND AN UPDATED MEDICATION LIST, PHARMACY IS PUTTING TOGETHER MEDICATION PACKAGING FOR THE PATIENT.     Additional notes: PLEASE FAX

## 2022-10-18 ENCOUNTER — OFFICE VISIT (OUTPATIENT)
Dept: FAMILY MEDICINE CLINIC | Facility: CLINIC | Age: 52
End: 2022-10-18

## 2022-10-18 VITALS
OXYGEN SATURATION: 100 % | DIASTOLIC BLOOD PRESSURE: 72 MMHG | HEART RATE: 90 BPM | TEMPERATURE: 98.4 F | HEIGHT: 70 IN | BODY MASS INDEX: 24.39 KG/M2 | WEIGHT: 170.4 LBS | SYSTOLIC BLOOD PRESSURE: 118 MMHG

## 2022-10-18 DIAGNOSIS — Z23 IMMUNIZATION DUE: ICD-10-CM

## 2022-10-18 DIAGNOSIS — G47.9 SLEEP DISORDER: ICD-10-CM

## 2022-10-18 DIAGNOSIS — Z12.31 ENCOUNTER FOR SCREENING MAMMOGRAM FOR MALIGNANT NEOPLASM OF BREAST: Primary | ICD-10-CM

## 2022-10-18 PROCEDURE — 99213 OFFICE O/P EST LOW 20 MIN: CPT | Performed by: FAMILY MEDICINE

## 2022-10-18 PROCEDURE — 90686 IIV4 VACC NO PRSV 0.5 ML IM: CPT | Performed by: FAMILY MEDICINE

## 2022-10-18 PROCEDURE — 90715 TDAP VACCINE 7 YRS/> IM: CPT | Performed by: FAMILY MEDICINE

## 2022-10-18 PROCEDURE — G0008 ADMIN INFLUENZA VIRUS VAC: HCPCS | Performed by: FAMILY MEDICINE

## 2022-10-18 PROCEDURE — 90471 IMMUNIZATION ADMIN: CPT | Performed by: FAMILY MEDICINE

## 2022-10-18 RX ORDER — MELOXICAM 15 MG/1
TABLET ORAL
COMMUNITY
Start: 2022-10-17

## 2022-10-18 NOTE — PROGRESS NOTES
Chief Complaint  Insomnia and mammogram referral    51-year-old female presents to the office today complaining of changing sleeping patterns.  Patient states he used to sleep 3 hours a day, she notes that that started after she had a stroke 9 years ago.      States that she usually sleeps 3 hours in a day.  She usually goes to bed at 12 AM but does not fall asleep until 3 AM and wakes up at 6 AM without an alarm.  Recently she has noticed that she has been sleeping more than 3 hours and that she has to set an alarm to wake up.   Patient notes that she wakes up feeling refreshed, denies fatigue during the day or daytime sleepiness.  She denies nap times during the day, caffeine intake or excessive screen exposure during the day.    She is having some life changes that she feels happy about and relieving some of her stress that she is to have.    Patient denies depression, or anxiety, weight loss, change appetite or fatigue.  She continues to follow with her GYN for her ovarian cancer.  Continues to follow with her cardiologist for her cardiac conditions    Patient also would like her annual mammogram ordered.  Patient has no other concerns.        Subjective         Hanh Mederos presents to [facility]  History of Present Illness      Objective     Review of Systems   Constitutional: Negative for chills.   Respiratory: Negative for cough, chest tightness and shortness of breath.    Cardiovascular: Negative for chest pain, palpitations and leg swelling.        Past Medical History:   Diagnosis Date   • A-fib (HCC)    • Broken wrist    • C. difficile colitis    • Cancer (HCC)     uterine   • CHF (congestive heart failure) ([facility])    • COVID-19 virus infection 01/08/2021   • Disease of thyroid gland    • History of seizures    • Hypertension    • MI (myocardial infarction) ([facility])    • MRSA carrier    • Pacemaker    • PAD (peripheral artery disease) ([facility])    • SSS (sick sinus syndrome)  ([facility])    • Stroke ([facility]) 2014        Current Outpatient Medications:   •  calcium carbonate-vitamin d 600-400 MG-UNIT per tablet, Take 1 tablet by mouth Daily., Disp: 90 tablet, Rfl: 3  •  EQ Aspirin 325 MG tablet, Take 1 tablet by mouth Daily., Disp: 90 tablet, Rfl: 1  •  escitalopram (Lexapro) 10 MG tablet, Take 1 tablet by mouth Daily., Disp: 90 tablet, Rfl: 1  •  gabapentin (NEURONTIN) 300 MG capsule, Take 1 capsule by mouth Every Night., Disp: 90 capsule, Rfl: 0  •  levothyroxine (SYNTHROID, LEVOTHROID) 75 MCG tablet, Take 1 tablet by mouth Daily., Disp: 90 tablet, Rfl: 1  •  lisinopril (PRINIVIL,ZESTRIL) 10 MG tablet, Take 1 tablet by mouth Daily., Disp: 90 tablet, Rfl: 1  •  meloxicam (MOBIC) 15 MG tablet, , Disp: , Rfl:   •  MULTIPLE VITAMINS-MINERALS ER PO, Take  by mouth., Disp: , Rfl:   •  naloxone (Narcan) 4 MG/0.1ML nasal spray, 1 spray into the nostril(s) as directed by provider As Needed (overdose)., Disp: 2 each, Rfl: 1  •  oxybutynin (DITROPAN) 5 MG tablet, Take 1 tablet by mouth 4 (Four) Times a Day., Disp: 360 tablet, Rfl: 1  •  pantoprazole (PROTONIX) 40 MG EC tablet, Take 1 tablet by mouth Daily., Disp: 90 tablet, Rfl: 1  •  topiramate (TOPAMAX) 100 MG tablet, Take 1 tablet by mouth Every Night., Disp: 90 tablet, Rfl: 1  •  traMADol (ULTRAM) 50 MG tablet, Take 1 tablet by mouth Every 6 (Six) Hours As Needed for Moderate Pain ., Disp: 30 tablet, Rfl: 0  •  traZODone (DESYREL) 50 MG tablet, Take 1 tablet by mouth Every Night., Disp: 90 tablet, Rfl: 1  •  vitamin C (ASCORBIC ACID) 500 MG tablet, Take 500 mg by mouth Daily., Disp: , Rfl:   •  alendronate (Fosamax) 70 MG tablet, Take 1 tablet by mouth Every 7 (Seven) Days., Disp: 4 tablet, Rfl: 11  •  ammonium lactate (AmLactin) 12 % lotion, Apply  topically to the appropriate area as directed 2 (Two) Times a Day., Disp: 225 g, Rfl: 1  •  ferrous sulfate 325 (65 FE) MG tablet, Take 325 mg by mouth Daily With Breakfast., Disp: , Rfl:   •  meclizine  "(ANTIVERT) 25 MG tablet, TAKE ONE TABLET BY MOUTH THREE TIMES DAILY AS NEEDED for dizziness, Disp: 30 tablet, Rfl: 2  •  nabumetone (Relafen) 750 MG tablet, Take 1 tablet by mouth 2 (Two) Times a Day As Needed for Mild Pain  or Moderate Pain ., Disp: 60 tablet, Rfl: 2  •  oxyCODONE (ROXICODONE) 5 MG immediate release tablet, TAKE 1 TABLET BY MOUTH EVERY 6 HOURS AS NEEDED FOR PAIN FOR 4 DAYS, Disp: , Rfl:   •  polyethylene glycol (MIRALAX) 17 g packet, Take 17 g by mouth Daily., Disp: , Rfl:   •  promethazine (PHENERGAN) 25 MG tablet, Take 1 tablet by mouth Every 6 (Six) Hours As Needed for Nausea or Vomiting., Disp: 30 tablet, Rfl: 0  •  tiZANidine (ZANAFLEX) 4 MG tablet, TAKE ONE TABLET BY MOUTH EVERY 8 HOURS AS NEEDED FOR MUSCLE SPASMS, Disp: 30 tablet, Rfl: 2   Social History     Socioeconomic History   • Marital status:    Tobacco Use   • Smoking status: Never   • Smokeless tobacco: Never   Vaping Use   • Vaping Use: Never used   Substance and Sexual Activity   • Alcohol use: Never   • Drug use: Never   • Sexual activity: Defer      Vital Signs:   /72   Pulse 90   Temp 98.4 °F (36.9 °C)   Ht 177.8 cm (70\")   Wt 77.3 kg (170 lb 6.4 oz)   SpO2 100%   BMI 24.45 kg/m²       Physical Exam  Constitutional:       Appearance: Normal appearance. She is not ill-appearing.   HENT:      Head: Normocephalic and atraumatic.   Eyes:      Extraocular Movements: Extraocular movements intact.   Cardiovascular:      Rate and Rhythm: Normal rate and regular rhythm.      Heart sounds: No murmur heard.  Pulmonary:      Effort: Pulmonary effort is normal.      Breath sounds: Normal breath sounds.   Lymphadenopathy:      Cervical: No cervical adenopathy.      Upper Body:      Right upper body: No supraclavicular or axillary adenopathy.      Left upper body: No supraclavicular or axillary adenopathy.   Skin:     Capillary Refill: Capillary refill takes less than 2 seconds.   Neurological:      Mental Status: She is " alert and oriented to person, place, and time.          Result Review :                 Assessment and Plan    Diagnoses and all orders for this visit:    1. Encounter for screening mammogram for malignant neoplasm of breast (Primary)  -     Mammo Screening Digital Tomosynthesis Bilateral With CAD; Future    2. Immunization due  -     Tdap Vaccine Greater Than or Equal To 8yo IM  -     Varicella Vaccine Subcutaneous  -     FluLaval/Fluzone >6 mos (9211-4693)    3. Sleep disorder    · It is normal for patient to sleep more than 3 hours a day.     · I offered referral to sleep specialist, but patient did not want and declined referral.   · Advised patient on healthy sleep pattern and sleep hygiene  ·     Follow Up   Return in about 6 months (around 4/18/2023) for for second zostar vaccine dose.  Patient was given instructions and counseling regarding her condition or for health maintenance advice. Please see specific information pulled into the AVS if appropriate.

## 2022-10-20 ENCOUNTER — OFFICE VISIT (OUTPATIENT)
Dept: FAMILY MEDICINE CLINIC | Facility: CLINIC | Age: 52
End: 2022-10-20

## 2022-10-20 VITALS
HEART RATE: 76 BPM | HEIGHT: 70 IN | WEIGHT: 167.8 LBS | SYSTOLIC BLOOD PRESSURE: 114 MMHG | BODY MASS INDEX: 24.02 KG/M2 | DIASTOLIC BLOOD PRESSURE: 70 MMHG | TEMPERATURE: 97.4 F | OXYGEN SATURATION: 97 %

## 2022-10-20 DIAGNOSIS — T50.Z95A VACCINE REACTION, INITIAL ENCOUNTER: Primary | ICD-10-CM

## 2022-10-20 PROCEDURE — 99213 OFFICE O/P EST LOW 20 MIN: CPT | Performed by: FAMILY MEDICINE

## 2022-10-20 NOTE — PROGRESS NOTES
Chief Complaint  Dizziness, Vomiting, and reaction to vaccine  (Tdap and flu /)    Subjective        Hanh Mederos presents to [facility]  History of Present Illness  The patient presents today for complaints of uncontrollable shivering, migraine, nausea, and dizziness.     rigors from Tdap  On 10/18/2022 at 8:00 AM, she received her Tdap and influenza vaccinations at the same time. Approximately 12 hours later 8:00PM, she got home and had uncontrollable shivering, a migraine, nausea, and dizziness to the point where she had trouble getting out of her car. When she got home, she put herself in a shower and gradually increased the heat because she treated hypothermia. After 45 minutes in the shower, her core temperature felt the same. She was still shaking violently. She got dressed, put layers of clothes on, pushed her heat up to 78 degrees, got into the bed, covered up with multiple blankets, and she was still freezing, shaking, and a migraine. Her service dog crawled up into her bed, which is not good. She called her daughter because she could not get out of bed to go to the bathroom. She tried and fell, but did not hurt anything. She just could not stand on her legs. She called her daughter and she made her some hot tea, walked her to the bathroom, and put her back in bed. She took 800 mg of ibuprofen and Phenergan. She had eaten, drank water, and did everything she was supposed to do during the day. She took her temperature and it was normal. She slept for 11 hours. She called the doctor's office the next day and talked to Breonna, who believes it was a reaction to the influenza vaccinations. She gets influenza vaccinations every year and never misses them. She was nauseated the whole day. On 10/19/2022, her migraine was down to a headache. The shaking went down to level 1, tolerable. She canceled what she needed to do during the day. She felt better in the evening. She went to her  "women's group in [facility]. She had no shaking at all. She got home last night and had a little bit of shaking in both nights. On 10/18/2022 and 10/19/2022, she had night sweats. On 10/20/2022, she woke up and started shaking again, it felt extremely cold, and she had a migraine again. She called back and thought it was not good. It went away, but now it is back again. She denies any vertigo. She denies any other symptoms of cough, sore throat, or runny nose. 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 She has never had any problems with her influenza booster. She has had a Tdap vaccine. She is prone to dizziness as well. She denies any fever.    Objective   Vital Signs:  /70   Pulse 76   Temp 97.4 °F (36.3 °C)   Ht 177.8 cm (70\")   Wt 76.1 kg (167 lb 12.8 oz)   SpO2 97%   BMI 24.08 kg/m²   Estimated body mass index is 24.08 kg/m² as calculated from the following:    Height as of this encounter: 177.8 cm (70\").    Weight as of this encounter: 76.1 kg (167 lb 12.8 oz).    BMI is within normal parameters. No other follow-up for BMI required.      Physical Exam  Constitutional:       General: She is not in acute distress.     Appearance: Normal appearance. She is well-developed.   HENT:      Head: Normocephalic and atraumatic.      Right Ear: Tympanic membrane, ear canal and external ear normal.      Left Ear: Tympanic membrane, ear canal and external ear normal.      Mouth/Throat:      Mouth: Mucous membranes are moist.      Pharynx: Oropharynx is clear.   Eyes:      Conjunctiva/sclera: Conjunctivae normal.      Pupils: Pupils are equal, round, and reactive to light.   Neck:      Thyroid: No thyromegaly.   Cardiovascular:      Rate and Rhythm: Normal rate and regular rhythm.      Heart sounds: No murmur heard.  Pulmonary:      Effort: Pulmonary effort is normal.      Breath sounds: Normal breath sounds. No wheezing.   Abdominal:      General: Bowel sounds are normal. "      Palpations: Abdomen is soft.      Tenderness: There is no abdominal tenderness.   Musculoskeletal:         General: Normal range of motion.      Cervical back: Neck supple.   Lymphadenopathy:      Cervical: No cervical adenopathy.   Skin:     General: Skin is warm and dry.   Neurological:      Mental Status: She is alert and oriented to person, place, and time.   Psychiatric:         Mood and Affect: Mood normal.         Behavior: Behavior normal.        Result Review :                Assessment and Plan   Diagnoses and all orders for this visit:    1. Vaccine reaction, initial encounter (Primary)    Discussed with patient at length that I really think the reaction she had was likely the pertussis component of the Tdap since she has tolerated tetanus boosters and flu vaccines in the past.  Follow-up as needed.       Follow Up   No follow-ups on file.  Patient was given instructions and counseling regarding her condition or for health maintenance advice. Please see specific information pulled into the AVS if appropriate.     Transcribed from ambient dictation for Meredith Lea Kehrer, MD by Tricia Harley   10/20/22   13:59 EDT    Patient or patient representative verbalized consent to the visit recording.  I have personally performed the services described in this document as transcribed by the above individual, and it is both accurate and complete.

## 2022-10-25 ENCOUNTER — HOSPITAL ENCOUNTER (OUTPATIENT)
Dept: MAMMOGRAPHY | Facility: HOSPITAL | Age: 52
Discharge: HOME | End: 2022-10-25
Admitting: FAMILY MEDICINE

## 2022-10-25 DIAGNOSIS — Z12.31 ENCOUNTER FOR SCREENING MAMMOGRAM FOR MALIGNANT NEOPLASM OF BREAST: ICD-10-CM

## 2022-10-25 PROCEDURE — 77067 SCR MAMMO BI INCL CAD: CPT

## 2022-10-25 PROCEDURE — 77063 BREAST TOMOSYNTHESIS BI: CPT

## 2022-12-06 RX ORDER — TRAZODONE HYDROCHLORIDE 50 MG/1
TABLET ORAL
Qty: 132 TABLET | Refills: 0 | Status: SHIPPED | OUTPATIENT
Start: 2022-12-06

## 2022-12-06 RX ORDER — PANTOPRAZOLE SODIUM 40 MG/1
TABLET, DELAYED RELEASE ORAL
Qty: 132 TABLET | Refills: 0 | Status: SHIPPED | OUTPATIENT
Start: 2022-12-06

## 2022-12-14 ENCOUNTER — OFFICE VISIT (OUTPATIENT)
Dept: FAMILY MEDICINE CLINIC | Facility: CLINIC | Age: 52
End: 2022-12-14

## 2022-12-14 VITALS
TEMPERATURE: 98.2 F | OXYGEN SATURATION: 100 % | HEART RATE: 94 BPM | HEIGHT: 70 IN | WEIGHT: 170.4 LBS | BODY MASS INDEX: 24.39 KG/M2 | DIASTOLIC BLOOD PRESSURE: 74 MMHG | SYSTOLIC BLOOD PRESSURE: 118 MMHG

## 2022-12-14 DIAGNOSIS — I63.9 CEREBROVASCULAR ACCIDENT (CVA), UNSPECIFIED MECHANISM: ICD-10-CM

## 2022-12-14 DIAGNOSIS — M25.511 ACUTE PAIN OF RIGHT SHOULDER: Primary | ICD-10-CM

## 2022-12-14 DIAGNOSIS — D51.0 PERNICIOUS ANEMIA: ICD-10-CM

## 2022-12-14 DIAGNOSIS — I25.10 ARTERIOSCLEROSIS OF CORONARY ARTERY: ICD-10-CM

## 2022-12-14 DIAGNOSIS — I10 PRIMARY HYPERTENSION: ICD-10-CM

## 2022-12-14 DIAGNOSIS — E03.9 HYPOTHYROIDISM, UNSPECIFIED TYPE: ICD-10-CM

## 2022-12-14 LAB
ALBUMIN SERPL-MCNC: 3.9 G/DL (ref 3.5–5.2)
ALBUMIN/GLOB SERPL: 2.1 G/DL
ALP SERPL-CCNC: 152 U/L (ref 39–117)
ALT SERPL-CCNC: 13 U/L (ref 1–33)
AST SERPL-CCNC: 18 U/L (ref 1–32)
BASOPHILS # BLD AUTO: 0.05 10*3/MM3 (ref 0–0.2)
BASOPHILS NFR BLD AUTO: 1.5 % (ref 0–1.5)
BILIRUB SERPL-MCNC: 0.3 MG/DL (ref 0–1.2)
BUN SERPL-MCNC: 20 MG/DL (ref 6–20)
BUN/CREAT SERPL: 19.4 (ref 7–25)
CALCIUM SERPL-MCNC: 8.7 MG/DL (ref 8.6–10.5)
CHLORIDE SERPL-SCNC: 110 MMOL/L (ref 98–107)
CHOLEST SERPL-MCNC: 145 MG/DL (ref 0–200)
CO2 SERPL-SCNC: 25.4 MMOL/L (ref 22–29)
CREAT SERPL-MCNC: 1.03 MG/DL (ref 0.57–1)
EGFRCR SERPLBLD CKD-EPI 2021: 65.2 ML/MIN/1.73
EOSINOPHIL # BLD AUTO: 0.11 10*3/MM3 (ref 0–0.4)
EOSINOPHIL NFR BLD AUTO: 3.3 % (ref 0.3–6.2)
ERYTHROCYTE [DISTWIDTH] IN BLOOD BY AUTOMATED COUNT: 13.8 % (ref 12.3–15.4)
GLOBULIN SER CALC-MCNC: 1.9 GM/DL
GLUCOSE SERPL-MCNC: 86 MG/DL (ref 65–99)
HCT VFR BLD AUTO: 35.5 % (ref 34–46.6)
HDLC SERPL-MCNC: 56 MG/DL (ref 40–60)
HGB BLD-MCNC: 11.4 G/DL (ref 12–15.9)
IMM GRANULOCYTES # BLD AUTO: 0.01 10*3/MM3 (ref 0–0.05)
IMM GRANULOCYTES NFR BLD AUTO: 0.3 % (ref 0–0.5)
LDLC SERPL CALC-MCNC: 77 MG/DL (ref 0–100)
LYMPHOCYTES # BLD AUTO: 0.91 10*3/MM3 (ref 0.7–3.1)
LYMPHOCYTES NFR BLD AUTO: 27.7 % (ref 19.6–45.3)
MCH RBC QN AUTO: 27.4 PG (ref 26.6–33)
MCHC RBC AUTO-ENTMCNC: 32.1 G/DL (ref 31.5–35.7)
MCV RBC AUTO: 85.3 FL (ref 79–97)
MONOCYTES # BLD AUTO: 0.3 10*3/MM3 (ref 0.1–0.9)
MONOCYTES NFR BLD AUTO: 9.1 % (ref 5–12)
NEUTROPHILS # BLD AUTO: 1.91 10*3/MM3 (ref 1.7–7)
NEUTROPHILS NFR BLD AUTO: 58.1 % (ref 42.7–76)
NRBC BLD AUTO-RTO: 0 /100 WBC (ref 0–0.2)
PLATELET # BLD AUTO: 156 10*3/MM3 (ref 140–450)
POTASSIUM SERPL-SCNC: 4.3 MMOL/L (ref 3.5–5.2)
PROT SERPL-MCNC: 5.8 G/DL (ref 6–8.5)
RBC # BLD AUTO: 4.16 10*6/MM3 (ref 3.77–5.28)
SODIUM SERPL-SCNC: 143 MMOL/L (ref 136–145)
TRIGL SERPL-MCNC: 60 MG/DL (ref 0–150)
TSH SERPL DL<=0.005 MIU/L-ACNC: 0.75 UIU/ML (ref 0.27–4.2)
VLDLC SERPL CALC-MCNC: 12 MG/DL (ref 5–40)
WBC # BLD AUTO: 3.29 10*3/MM3 (ref 3.4–10.8)

## 2022-12-14 PROCEDURE — 99214 OFFICE O/P EST MOD 30 MIN: CPT | Performed by: NURSE PRACTITIONER

## 2022-12-14 NOTE — PROGRESS NOTES
"Chief Complaint  Shoulder Pain (Right, started 5 or 6 days ago) and Hyperlipidemia    Subjective        Hanh Mederos presents to [facility]  History of Present Illness    Patient is here today with complaint of right shoulder pain.  She states that it started about 5-6 days ago.  Denies any fall or injury to area.  Feels like she overreaches for something.   Not painful all the time, but just catches now and then.    Don't have record of shoulder x-ray prior.      Also due for cholesterol again.   Last check 2/2022.          Objective   Vital Signs:  /74   Pulse 94   Temp 98.2 °F (36.8 °C)   Ht 177.8 cm (70\")   Wt 77.3 kg (170 lb 6.4 oz)   SpO2 100%   BMI 24.45 kg/m²   Estimated body mass index is 24.45 kg/m² as calculated from the following:    Height as of this encounter: 177.8 cm (70\").    Weight as of this encounter: 77.3 kg (170 lb 6.4 oz).    BMI is within normal parameters. No other follow-up for BMI required.      Physical Exam  Constitutional:       Appearance: Normal appearance.   Musculoskeletal:      Right shoulder: Tenderness (with all ROM) present. Decreased range of motion (slightly with flexion, no decrease otherwise. ).   Skin:     General: Skin is warm and dry.   Neurological:      Mental Status: She is alert and oriented to person, place, and time.   Psychiatric:         Mood and Affect: Mood normal.         Behavior: Behavior normal.         Thought Content: Thought content normal.         Judgment: Judgment normal.        Result Review :                Assessment and Plan   Diagnoses and all orders for this visit:    1. Acute pain of right shoulder (Primary)  -     XR Shoulder 2+ View Right; Future    2. Primary hypertension  -     CBC & Differential  -     Comprehensive Metabolic Panel  -     Lipid Panel  -     TSH    3. Arteriosclerosis of coronary artery  -     Lipid Panel    4. Cerebrovascular accident (CVA), unspecified mechanism (HCC)  -     " Lipid Panel    5. Pernicious anemia  -     CBC & Differential  -     Comprehensive Metabolic Panel    6. Hypothyroidism, unspecified type  -     TSH    pain in shoulder-discussed ROM exercises.  Use of ice 20 min increments 4 times daily.  Continue nsaid as needed.  Will obtain xray and call with results and any changes needed to plan of care.     We will check labs today and call with results.  Recommended patient get back as soon as possible for Medicare wellness.  She is scheduled in January.         Follow Up   No follow-ups on file.  Patient was given instructions and counseling regarding her condition or for health maintenance advice. Please see specific information pulled into the AVS if appropriate.

## 2022-12-15 NOTE — PROGRESS NOTES
Please call patient with results of labs.  Labs are all stable.  Cholesterol is within normal limits.  Keep follow-up as discussed

## 2022-12-21 RX ORDER — LEVOTHYROXINE SODIUM 0.07 MG/1
75 TABLET ORAL DAILY
Qty: 90 TABLET | Refills: 1 | Status: SHIPPED | OUTPATIENT
Start: 2022-12-21

## 2023-01-17 ENCOUNTER — TELEPHONE (OUTPATIENT)
Dept: FAMILY MEDICINE CLINIC | Facility: CLINIC | Age: 53
End: 2023-01-17
Payer: MEDICARE

## 2023-01-17 DIAGNOSIS — S32.020A COMPRESSION FRACTURE OF L2, CLOSED, INITIAL ENCOUNTER: ICD-10-CM

## 2023-01-17 RX ORDER — GABAPENTIN 300 MG/1
300 CAPSULE ORAL NIGHTLY
Qty: 90 CAPSULE | Refills: 0 | Status: CANCELLED | OUTPATIENT
Start: 2023-01-17

## 2023-01-17 NOTE — TELEPHONE ENCOUNTER
Caller: [facility] [address] [place] [address], [place] - [address] - [phone] [facility] [phone] FX    Relationship: Pharmacy    Best call back number: [phone]    Requested Prescriptions:   Requested Prescriptions     Pending Prescriptions Disp Refills   • gabapentin (NEURONTIN) 300 MG capsule 90 capsule 0     Sig: Take 1 capsule by mouth Every Night.   • topiramate (TOPAMAX) 100 MG tablet 90 tablet 1     Sig: Take 1 tablet by mouth Every Night.        Pharmacy where request should be sent: [facility] [address] [place] [address] - [address] - [phone] [facility] [phone] FX     Additional details provided by patient: PATIENT IS TRAVELING FOR [place] AND NEEDS A REFILL SENT TO PHARMACY ASAP, PLEASE ADVISE PATIENT WHEN SENT TO PHARMACY ASAP, PATIENT IS COMPLETELY OUT OF MEDICATION ASAP    Does the patient have less than a 3 day supply:  [x] Yes  [] No    Would you like a call back once the refill request has been completed: [x] Yes [] No    If the office needs to give you a call back, can they leave a voicemail: [x] Yes [] No    Elidia Burleson Rep   01/17/23 11:34 EST

## 2023-01-18 ENCOUNTER — TELEPHONE (OUTPATIENT)
Dept: FAMILY MEDICINE CLINIC | Facility: CLINIC | Age: 53
End: 2023-01-18
Payer: MEDICARE

## 2023-01-18 RX ORDER — TOPIRAMATE 100 MG/1
100 TABLET, FILM COATED ORAL NIGHTLY
Qty: 90 TABLET | Refills: 1 | Status: SHIPPED | OUTPATIENT
Start: 2023-01-18 | End: 2023-02-27

## 2023-01-18 NOTE — TELEPHONE ENCOUNTER
Spoke to pt and she was advised again that the provider can not send a controlled RX across stated lines.  I called pts pharmacy here in town to see if the pharmacy in [place] could pull the rx and she stated that was not an option.  Pt was notified that this was not an option

## 2023-01-18 NOTE — TELEPHONE ENCOUNTER
Pt was advised to call the pharmacy where she is and see if they could pull from the [facility] pharmacy here, because provider can not fill controlled medications across state lines

## 2023-01-18 NOTE — TELEPHONE ENCOUNTER
Caller: Hanh Mederos    Relationship: Self    Best call back number: [phone]    What was the call regarding: PATIENT STATES THAT SHE HAS BEEN TRYING TO GET HER GABAPENTIN REFILLED, AND SHE IS CURRENTLY IN [place]. SHE SPOKE TO NATALIE'S NURSE, MUNA, AND SHE SUGGESTED TO GET A PHONE NUMBER TO CALL IN THE REFILL. THIS PHONE NUMBER [phone]    Do you require a callback: YES

## 2023-01-18 NOTE — TELEPHONE ENCOUNTER
[facility] will not allow me to send scheduled medications to another state. I can try to send to [facility] and we can see what they can do.    I will send in topamax.

## 2023-02-02 ENCOUNTER — OFFICE VISIT (OUTPATIENT)
Dept: FAMILY MEDICINE CLINIC | Facility: CLINIC | Age: 53
End: 2023-02-02
Payer: MEDICARE

## 2023-02-02 VITALS
HEART RATE: 82 BPM | HEIGHT: 70 IN | WEIGHT: 167.4 LBS | TEMPERATURE: 97.5 F | DIASTOLIC BLOOD PRESSURE: 86 MMHG | BODY MASS INDEX: 23.96 KG/M2 | SYSTOLIC BLOOD PRESSURE: 118 MMHG | OXYGEN SATURATION: 99 %

## 2023-02-02 DIAGNOSIS — J02.9 SORE THROAT: Primary | ICD-10-CM

## 2023-02-02 DIAGNOSIS — J02.0 STREP THROAT: ICD-10-CM

## 2023-02-02 LAB
EXPIRATION DATE: ABNORMAL
INTERNAL CONTROL: ABNORMAL
Lab: ABNORMAL
S PYO AG THROAT QL: POSITIVE

## 2023-02-02 PROCEDURE — 87880 STREP A ASSAY W/OPTIC: CPT | Performed by: NURSE PRACTITIONER

## 2023-02-02 PROCEDURE — 99214 OFFICE O/P EST MOD 30 MIN: CPT | Performed by: NURSE PRACTITIONER

## 2023-02-02 RX ORDER — PREDNISONE 20 MG/1
TABLET ORAL
Qty: 15 TABLET | Refills: 0 | Status: SHIPPED | OUTPATIENT
Start: 2023-02-02 | End: 2023-02-12

## 2023-02-02 RX ORDER — LEVOFLOXACIN 500 MG/1
500 TABLET, FILM COATED ORAL DAILY
Qty: 7 TABLET | Refills: 0 | Status: SHIPPED | OUTPATIENT
Start: 2023-02-02
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
## 2023-02-09 ENCOUNTER — TELEPHONE (OUTPATIENT)
Dept: FAMILY MEDICINE CLINIC | Facility: CLINIC | Age: 53
End: 2023-02-09

## 2023-02-09 ENCOUNTER — OFFICE VISIT (OUTPATIENT)
Dept: FAMILY MEDICINE CLINIC | Facility: CLINIC | Age: 53
End: 2023-02-09
Payer: MEDICARE

## 2023-02-09 VITALS
SYSTOLIC BLOOD PRESSURE: 96 MMHG | DIASTOLIC BLOOD PRESSURE: 68 MMHG | TEMPERATURE: 97.5 F | WEIGHT: 163.6 LBS | HEIGHT: 70 IN | OXYGEN SATURATION: 99 % | BODY MASS INDEX: 23.42 KG/M2 | HEART RATE: 98 BPM

## 2023-02-09 DIAGNOSIS — R35.0 URINARY FREQUENCY: Primary | ICD-10-CM

## 2023-02-09 DIAGNOSIS — R61 NIGHT SWEATS: ICD-10-CM

## 2023-02-09 DIAGNOSIS — E55.9 VITAMIN D DEFICIENCY: ICD-10-CM

## 2023-02-09 PROCEDURE — 99214 OFFICE O/P EST MOD 30 MIN: CPT | Performed by: NURSE PRACTITIONER

## 2023-02-09 NOTE — PROGRESS NOTES
"Chief Complaint  Night Sweats    Subjective        Hanh Mederos presents to [facility]  History of Present Illness    Patient is here today with complaint of night sweats. Has been going on since before starting steroids last week for bronchitis and strep throat.  Has to change clothes through the night.  Has had complete hysterectomy and went through the change a long time ago.   Has lost 4 pounds in the last week.      Not taking gabapentin anymore.  Went 4 weeks without and didn't restart after getting back from last time    Having frequency this week.     Reports she hasn't had any seizures that she knows of.      Denies any increase fatigue, dizziness.     Denies any increase depression or anything like that    Objective   Vital Signs:  BP 96/68   Pulse 98   Temp 97.5 °F (36.4 °C)   Ht 177.8 cm (70\")   Wt 74.2 kg (163 lb 9.6 oz)   SpO2 99%   BMI 23.47 kg/m²   Estimated body mass index is 23.47 kg/m² as calculated from the following:    Height as of this encounter: 177.8 cm (70\").    Weight as of this encounter: 74.2 kg (163 lb 9.6 oz).       BMI is within normal parameters. No other follow-up for BMI required.      Physical Exam  Constitutional:       Appearance: Normal appearance.   Cardiovascular:      Rate and Rhythm: Normal rate and regular rhythm.      Pulses: Normal pulses.   Pulmonary:      Effort: Pulmonary effort is normal.      Breath sounds: Normal breath sounds.   Skin:     General: Skin is warm and dry.   Neurological:      Mental Status: She is alert and oriented to person, place, and time.   Psychiatric:         Mood and Affect: Mood normal.         Behavior: Behavior normal.         Thought Content: Thought content normal.         Judgment: Judgment normal.        Result Review :                   Assessment and Plan   Diagnoses and all orders for this visit:    1. Urinary frequency (Primary)  -     CBC & Differential  -     Comprehensive Metabolic " Panel  -     TSH  -     Magnesium  -     Vitamin D,25-Hydroxy  -     Vitamin B12  -     POCT urinalysis dipstick, automated    2. Night sweats  -     CBC & Differential  -     Comprehensive Metabolic Panel  -     TSH  -     Magnesium  -     Vitamin D,25-Hydroxy  -     Vitamin B12    3. Vitamin D deficiency  -     Vitamin D,25-Hydroxy      We will check labs for night sweats and urinary frequency.  We will check urine.  Will call with results any changes needed plan of care.  She verbalized understanding         Follow Up   No follow-ups on file.  Patient was given instructions and counseling regarding her condition or for health maintenance advice. Please see specific information pulled into the AVS if appropriate.

## 2023-02-09 NOTE — TELEPHONE ENCOUNTER
----- Message from EMILIANA Penn sent at 2/9/2023 12:38 PM EST -----  Please call patient and see if she would like to stop back by and leave urine sample.

## 2023-02-10 ENCOUNTER — LAB (OUTPATIENT)
Dept: FAMILY MEDICINE CLINIC | Facility: CLINIC | Age: 53
End: 2023-02-10
Payer: MEDICARE

## 2023-02-10 ENCOUNTER — CLINICAL SUPPORT (OUTPATIENT)
Dept: FAMILY MEDICINE CLINIC | Facility: CLINIC | Age: 53
End: 2023-02-10
Payer: MEDICARE

## 2023-02-10 DIAGNOSIS — R35.0 URINARY FREQUENCY: Primary | ICD-10-CM

## 2023-02-10 LAB
25(OH)D3+25(OH)D2 SERPL-MCNC: 38.9 NG/ML (ref 30–100)
ALBUMIN SERPL-MCNC: 4.4 G/DL (ref 3.5–5.2)
ALBUMIN/GLOB SERPL: 2.1 G/DL
ALP SERPL-CCNC: 121 U/L (ref 39–117)
ALT SERPL-CCNC: 8 U/L (ref 1–33)
AST SERPL-CCNC: 11 U/L (ref 1–32)
BASOPHILS # BLD AUTO: 0.03 10*3/MM3 (ref 0–0.2)
BASOPHILS NFR BLD AUTO: 0.4 % (ref 0–1.5)
BILIRUB BLD-MCNC: NEGATIVE MG/DL
BILIRUB SERPL-MCNC: 0.3 MG/DL (ref 0–1.2)
BUN SERPL-MCNC: 21 MG/DL (ref 6–20)
BUN/CREAT SERPL: 21.4 (ref 7–25)
CALCIUM SERPL-MCNC: 9.3 MG/DL (ref 8.6–10.5)
CHLORIDE SERPL-SCNC: 105 MMOL/L (ref 98–107)
CO2 SERPL-SCNC: 22.3 MMOL/L (ref 22–29)
CREAT SERPL-MCNC: 0.98 MG/DL (ref 0.57–1)
EGFRCR SERPLBLD CKD-EPI 2021: 69.2 ML/MIN/1.73
EOSINOPHIL # BLD AUTO: 0 10*3/MM3 (ref 0–0.4)
EOSINOPHIL NFR BLD AUTO: 0 % (ref 0.3–6.2)
ERYTHROCYTE [DISTWIDTH] IN BLOOD BY AUTOMATED COUNT: 13.3 % (ref 12.3–15.4)
EXPIRATION DATE: ABNORMAL
GLOBULIN SER CALC-MCNC: 2.1 GM/DL
GLUCOSE SERPL-MCNC: 98 MG/DL (ref 65–99)
GLUCOSE UR STRIP-MCNC: NEGATIVE MG/DL
HCT VFR BLD AUTO: 38 % (ref 34–46.6)
HGB BLD-MCNC: 12.5 G/DL (ref 12–15.9)
IMM GRANULOCYTES # BLD AUTO: 0.04 10*3/MM3 (ref 0–0.05)
IMM GRANULOCYTES NFR BLD AUTO: 0.5 % (ref 0–0.5)
KETONES UR QL: NEGATIVE
LEUKOCYTE EST, POC: NEGATIVE
LYMPHOCYTES # BLD AUTO: 0.67 10*3/MM3 (ref 0.7–3.1)
LYMPHOCYTES NFR BLD AUTO: 9 % (ref 19.6–45.3)
Lab: ABNORMAL
MAGNESIUM SERPL-MCNC: 2.4 MG/DL (ref 1.6–2.6)
MCH RBC QN AUTO: 28.9 PG (ref 26.6–33)
MCHC RBC AUTO-ENTMCNC: 32.9 G/DL (ref 31.5–35.7)
MCV RBC AUTO: 87.8 FL (ref 79–97)
MONOCYTES # BLD AUTO: 0.36 10*3/MM3 (ref 0.1–0.9)
MONOCYTES NFR BLD AUTO: 4.8 % (ref 5–12)
NEUTROPHILS # BLD AUTO: 6.37 10*3/MM3 (ref 1.7–7)
NEUTROPHILS NFR BLD AUTO: 85.3 % (ref 42.7–76)
NITRITE UR-MCNC: NEGATIVE MG/ML
NRBC BLD AUTO-RTO: 0 /100 WBC (ref 0–0.2)
PH UR: 7 [PH] (ref 5–8)
PLATELET # BLD AUTO: 315 10*3/MM3 (ref 140–450)
POTASSIUM SERPL-SCNC: 4.2 MMOL/L (ref 3.5–5.2)
PROT SERPL-MCNC: 6.5 G/DL (ref 6–8.5)
PROT UR STRIP-MCNC: ABNORMAL MG/DL
RBC # BLD AUTO: 4.33 10*6/MM3 (ref 3.77–5.28)
RBC # UR STRIP: NEGATIVE /UL
SODIUM SERPL-SCNC: 141 MMOL/L (ref 136–145)
SP GR UR: 1.01 (ref 1–1.03)
TSH SERPL DL<=0.005 MIU/L-ACNC: 0.68 UIU/ML (ref 0.27–4.2)
UROBILINOGEN UR QL: NORMAL
VIT B12 SERPL-MCNC: 1075 PG/ML (ref 211–946)
WBC # BLD AUTO: 7.47 10*3/MM3 (ref 3.4–10.8)

## 2023-02-10 PROCEDURE — 81003 URINALYSIS AUTO W/O SCOPE: CPT | Performed by: NURSE PRACTITIONER

## 2023-02-10 NOTE — PROGRESS NOTES
Please let patient know that she is not anemic.  Labs are all stable.  No explanation for night sweats from labs except for parts of white blood cell are off from previous infection.  White blood cell count is within normal limits however.

## 2023-02-10 NOTE — PROGRESS NOTES
Please let patient know that she showed no bacteria in urine.  Trace amount of protein noted.  No changes needed recommendation currently

## 2023-02-17 ENCOUNTER — OFFICE VISIT (OUTPATIENT)
Dept: FAMILY MEDICINE CLINIC | Facility: CLINIC | Age: 53
End: 2023-02-17
Payer: MEDICARE

## 2023-02-17 VITALS
BODY MASS INDEX: 24.17 KG/M2 | HEIGHT: 70 IN | HEART RATE: 99 BPM | WEIGHT: 168.8 LBS | OXYGEN SATURATION: 99 % | SYSTOLIC BLOOD PRESSURE: 114 MMHG | TEMPERATURE: 97.5 F | DIASTOLIC BLOOD PRESSURE: 76 MMHG

## 2023-02-17 DIAGNOSIS — R05.2 SUBACUTE COUGH: ICD-10-CM

## 2023-02-17 DIAGNOSIS — R06.2 WHEEZING: Primary | ICD-10-CM

## 2023-02-17 DIAGNOSIS — H61.23 BILATERAL IMPACTED CERUMEN: ICD-10-CM

## 2023-02-17 PROCEDURE — 99213 OFFICE O/P EST LOW 20 MIN: CPT | Performed by: NURSE PRACTITIONER

## 2023-02-17 RX ORDER — DEXTROMETHORPHAN HYDROBROMIDE AND PROMETHAZINE HYDROCHLORIDE 15; 6.25 MG/5ML; MG/5ML
5 SYRUP ORAL 4 TIMES DAILY PRN
Qty: 180 ML | Refills: 0 | Status: SHIPPED | OUTPATIENT
Start: 2023-02-17 | End: 2023-03-27

## 2023-02-17 RX ORDER — ALBUTEROL SULFATE 90 UG/1
2 AEROSOL, METERED RESPIRATORY (INHALATION) EVERY 4 HOURS PRN
Qty: 18 G | Refills: 0 | Status: SHIPPED | OUTPATIENT
Start: 2023-02-17

## 2023-02-17 NOTE — PROGRESS NOTES
"Chief Complaint  Cough    Subjective        Hanh Mederos presents to [facility]  History of Present Illness    Patient presents today with complaint of cough.Of note she was seen on February 2 and treated for strep throat, February 9 and reported night sweats.  She reports productive cough that started 2 days ago. Yesterday was yellow production.    Having back pain that started yesterday    Had a little wheeze last night after cough.  Reports after eating she is getting dizziness.  Had this before and was having hypoglycemic with losing weight.    Was running in 40's-50's previously     Denies shortness of breath, fever    Thinks night sweats is where her thermostat is turned up to 72.    Finished antibiotic and steroid     Objective   Vital Signs:  /76   Pulse 99   Temp 97.5 °F (36.4 °C)   Ht 177.8 cm (70\")   Wt 76.6 kg (168 lb 12.8 oz)   SpO2 99%   BMI 24.22 kg/m²   Estimated body mass index is 24.22 kg/m² as calculated from the following:    Height as of this encounter: 177.8 cm (70\").    Weight as of this encounter: 76.6 kg (168 lb 12.8 oz).       BMI is within normal parameters. No other follow-up for BMI required.      Physical Exam  Constitutional:       Appearance: Normal appearance.   HENT:      Right Ear: There is impacted cerumen.      Left Ear: There is impacted cerumen.   Cardiovascular:      Rate and Rhythm: Normal rate and regular rhythm.      Pulses: Normal pulses.   Pulmonary:      Effort: Pulmonary effort is normal.      Comments: Bronchial sounds noted, cough during exam  Skin:     General: Skin is warm and dry.   Neurological:      Mental Status: She is alert.   Psychiatric:         Mood and Affect: Mood normal.         Behavior: Behavior normal.         Thought Content: Thought content normal.         Judgment: Judgment normal.        Result Review :            Ear Cerumen Removal    Date/Time: 2/20/2023 8:25 AM  Performed by: Rhys Estrada, " EMILIANA  Authorized by: Rhys Estrada APRN     Anesthesia:  Local Anesthetic: none  Location details: left ear and right ear  Patient tolerance: patient tolerated the procedure well with no immediate complications  Comments: Patient tolerated well.  No issues s/p procedure  Procedure type: irrigation   Sedation:  Patient sedated: no              Assessment and Plan   Diagnoses and all orders for this visit:    1. Wheezing (Primary)  -     XR Chest 2 View; Future    2. Subacute cough  -     XR Chest 2 View; Future    3. Bilateral impacted cerumen    Other orders  -     albuterol sulfate  (90 Base) MCG/ACT inhaler; Inhale 2 puffs Every 4 (Four) Hours As Needed for Wheezing.  Dispense: 18 g; Refill: 0  -     promethazine-dextromethorphan (PROMETHAZINE-DM) 6.25-15 MG/5ML syrup; Take 5 mL by mouth 4 (Four) Times a Day As Needed for Cough.  Dispense: 180 mL; Refill: 0    will give cough medication and inhaler. Would like to proceed with chest xray. Will call with results and any changes needed to plan of care.              Follow Up   No follow-ups on file.  Patient was given instructions and counseling regarding her condition or for health maintenance advice. Please see specific information pulled into the AVS if appropriate.

## 2023-02-20 DIAGNOSIS — R06.2 WHEEZING: ICD-10-CM

## 2023-02-20 DIAGNOSIS — R05.2 SUBACUTE COUGH: ICD-10-CM

## 2023-02-20 DIAGNOSIS — R93.89 ABNORMAL CHEST X-RAY: Primary | ICD-10-CM

## 2023-02-20 PROCEDURE — 69209 REMOVE IMPACTED EAR WAX UNI: CPT | Performed by: NURSE PRACTITIONER

## 2023-02-20 NOTE — PROGRESS NOTES
Please let patient know results of chest x-ray.  There is no focal consolidation noted.  There are multiple subcentimeter nodules scattered throughout the lungs bilaterally.  This is an incidental finding and not related to cough I do not believe.  This was not compared or found in x-ray in October.  I would suggest that we get a CT chest for evaluation of this.  If she is agreeable I will place referral

## 2023-02-24 DIAGNOSIS — R93.89 ABNORMAL CHEST X-RAY: ICD-10-CM

## 2023-02-27 RX ORDER — TOPIRAMATE 100 MG/1
TABLET, FILM COATED ORAL
Qty: 90 TABLET | Refills: 0 | Status: SHIPPED | OUTPATIENT
Start: 2023-02-27

## 2023-02-27 NOTE — PROGRESS NOTES
Please call patient with results of CT chest.  There is no focal consolidation, pleural effusion, or pneumothorax.   Incidental findings:  1. there is bilateral pulmonary nodules noted, multiple 1 mm.  Would recommend repeat CT scan for follow-up in 12 months.  2.  There is some dilation of the ascending aorta.  This should be rechecked based on size of 3.8 cm at the 1 year follow-up as well  3 there is a tiny hiatal hernia.  No further work-up needed of this.  This will be ready monitored for size at the 1 year as well.

## 2023-03-06 ENCOUNTER — TELEPHONE (OUTPATIENT)
Dept: GASTROENTEROLOGY | Facility: CLINIC | Age: 53
End: 2023-03-06
Payer: MEDICARE

## 2023-03-06 NOTE — TELEPHONE ENCOUNTER
Called patient regarding the overdue order for a capsule CLS.  Patient states she's no longer having any problems and would like to cancel the order.  Ok to proceed with cancellation?

## 2023-03-27 ENCOUNTER — OFFICE VISIT (OUTPATIENT)
Dept: FAMILY MEDICINE CLINIC | Facility: CLINIC | Age: 53
End: 2023-03-27
Payer: MEDICARE

## 2023-03-27 VITALS
HEIGHT: 70 IN | DIASTOLIC BLOOD PRESSURE: 68 MMHG | OXYGEN SATURATION: 99 % | TEMPERATURE: 97.8 F | SYSTOLIC BLOOD PRESSURE: 116 MMHG | WEIGHT: 175.2 LBS | BODY MASS INDEX: 25.08 KG/M2 | HEART RATE: 90 BPM

## 2023-03-27 DIAGNOSIS — S16.1XXA STRAIN OF NECK MUSCLE, INITIAL ENCOUNTER: Primary | ICD-10-CM

## 2023-03-27 RX ORDER — METHYLPREDNISOLONE 4 MG/1
TABLET ORAL
Qty: 21 TABLET | Refills: 0 | Status: SHIPPED | OUTPATIENT
Start: 2023-03-27

## 2023-03-27 RX ORDER — TIZANIDINE 4 MG/1
4 TABLET ORAL EVERY 8 HOURS PRN
Qty: 30 TABLET | Refills: 2 | Status: SHIPPED | OUTPATIENT
Start: 2023-03-27

## 2023-03-27 NOTE — PROGRESS NOTES
"Chief Complaint  Neck Pain    Subjective        Hanh Mederos presents to [facility] PRIMARY CARE  History of Present Illness     Patient is here today with complaint of waking up 8 days ago felt like crick in neck.      Tried pain relief topicals and hot showers and has not improved.  Bengay, blue emu.   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     Has tried to not carry anything heavy    Worsening factors: turning towards left is painful, to left is just uncomfortable.   Alleviating factors:  nothing    Certain ROM makes a stabbing pains.  Rates at a 7 on scale of 1-10      Denies any fall or injury to area  Denies any numbness or tingling into left arm.     Objective   Vital Signs:  /68   Pulse 90   Temp 97.8 °F (36.6 °C)   Ht 177.8 cm (70\")   Wt 79.5 kg (175 lb 3.2 oz)   SpO2 99%   BMI 25.14 kg/m²   Estimated body mass index is 25.14 kg/m² as calculated from the following:    Height as of this encounter: 177.8 cm (70\").    Weight as of this encounter: 79.5 kg (175 lb 3.2 oz).             Physical Exam  Constitutional:       Appearance: Normal appearance.   Musculoskeletal:      Cervical back: Spasms (Left paraspinal muscles) present. No bony tenderness. Decreased range of motion.   Neurological:      Mental Status: She is alert and oriented to person, place, and time.   Psychiatric:         Mood and Affect: Mood normal.         Behavior: Behavior normal.         Thought Content: Thought content normal.         Judgment: Judgment normal.        Result Review :                   Assessment and Plan   Diagnoses and all orders for this visit:    1. Strain of neck muscle, initial encounter (Primary)  -     methylPREDNISolone (MEDROL) 4 MG dose pack; Take as directed on package instructions.  Dispense: 21 tablet; Refill: 0  -     tiZANidine (ZANAFLEX) 4 MG tablet; Take 1 tablet by mouth Every 8 (Eight) Hours As Needed for Muscle Spasms.  Dispense: 30 tablet; Refill: 2  -     XR Spine " Cervical 2 or 3 View; Future    Will obtain x-ray for further evaluation.  Patient to start steroid and muscle relaxer as needed.  Discussed range of motion exercises for patient to trial.  If no resolution follow-up as needed.  Will call with results of x-ray and any changes needed plan of care.    She verbalized understanding and is agreeable.  Discussed with her I would like her to get back in at her convenience for wellness visit         Follow Up   Return if symptoms worsen or fail to improve, for Medicare Wellness.  Patient was given instructions and counseling regarding her condition or for health maintenance advice. Please see specific information pulled into the AVS if appropriate.

## 2023-03-30 DIAGNOSIS — S16.1XXA STRAIN OF NECK MUSCLE, INITIAL ENCOUNTER: ICD-10-CM

## 2023-03-30 NOTE — PROGRESS NOTES
Please let patient know that cervical spine is negative.  No abnormalities noted.  If persistent, notify provider.

## 2023-04-03 ENCOUNTER — OFFICE VISIT (OUTPATIENT)
Dept: FAMILY MEDICINE CLINIC | Facility: CLINIC | Age: 53
End: 2023-04-03
Payer: MEDICARE

## 2023-04-03 VITALS
HEART RATE: 77 BPM | HEIGHT: 70 IN | DIASTOLIC BLOOD PRESSURE: 70 MMHG | BODY MASS INDEX: 24.77 KG/M2 | OXYGEN SATURATION: 98 % | TEMPERATURE: 97.3 F | SYSTOLIC BLOOD PRESSURE: 128 MMHG | WEIGHT: 173 LBS

## 2023-04-03 DIAGNOSIS — L30.9 DERMATITIS: Primary | ICD-10-CM

## 2023-04-03 PROCEDURE — 3074F SYST BP LT 130 MM HG: CPT | Performed by: FAMILY MEDICINE

## 2023-04-03 PROCEDURE — 3078F DIAST BP <80 MM HG: CPT | Performed by: FAMILY MEDICINE

## 2023-04-03 PROCEDURE — 99213 OFFICE O/P EST LOW 20 MIN: CPT | Performed by: FAMILY MEDICINE

## 2023-04-03 RX ORDER — TRIAMCINOLONE ACETONIDE 1 MG/G
1 CREAM TOPICAL 2 TIMES DAILY PRN
Qty: 80 G | Refills: 0 | Status: SHIPPED | OUTPATIENT
Start: 2023-04-03

## 2023-04-03 NOTE — PROGRESS NOTES
"Chief Complaint  Rash (On back )    Subjective        Hanh Mederos presents to [facility] PRIMARY [facility]  History of Present Illness  Just got back from [place].  Rash on back started a few days after coming home.  Has had for about a week.  May be itchy but she may be thinking about it.  Started and finished a steroid for her neck strain.  In [place] she just hung out in a hotel room for 9 days.        Objective   Vital Signs:  /70   Pulse 77   Temp 97.3 °F (36.3 °C)   Ht 177.8 cm (70\")   Wt 78.5 kg (173 lb)   SpO2 98%   BMI 24.82 kg/m²   Estimated body mass index is 24.82 kg/m² as calculated from the following:    Height as of this encounter: 177.8 cm (70\").    Weight as of this encounter: 78.5 kg (173 lb).       BMI is within normal parameters. No other follow-up for BMI required.      Physical Exam  Constitutional:       General: She is not in acute distress.     Appearance: Normal appearance. She is well-developed.   HENT:      Head: Normocephalic and atraumatic.      Right Ear: External ear normal.      Left Ear: External ear normal.   Eyes:      Conjunctiva/sclera: Conjunctivae normal.      Pupils: Pupils are equal, round, and reactive to light.   Neck:      Thyroid: No thyromegaly.   Pulmonary:      Effort: Pulmonary effort is normal.   Skin:     Findings: Erythema and rash present.      Comments: Excoriated erythematous rash over right trapezius muscle and a 6 to 8 cm patch, few spots on left shoulder and one lower back that is just a 2 mm erythematous papule   Neurological:      Mental Status: She is alert and oriented to person, place, and time.   Psychiatric:         Mood and Affect: Mood normal.         Behavior: Behavior normal.        Result Review :                   Assessment and Plan   Diagnoses and all orders for this visit:    1. Dermatitis (Primary)  -     triamcinolone (KENALOG) 0.1 % cream; Apply 1 application topically to the appropriate area as directed 2 " (Two) Times a Day As Needed for Irritation or Rash.  Dispense: 80 g; Refill: 0    Dermatitis of uncertain etiology-patient advised to monitor and use some triamcinolone until rash resolves         Follow Up   No follow-ups on file.  Patient was given instructions and counseling regarding her condition or for health maintenance advice. Please see specific information pulled into the AVS if appropriate.

## 2023-04-15 NOTE — PATIENT INSTRUCTIONS
Please call back if you have any concern  Please call back if you had a bad reaction to the vaccine   declines

## 2023-04-17 ENCOUNTER — TELEPHONE (OUTPATIENT)
Dept: FAMILY MEDICINE CLINIC | Facility: CLINIC | Age: 53
End: 2023-04-17
Payer: MEDICARE

## 2023-04-17 DIAGNOSIS — S16.1XXA STRAIN OF NECK MUSCLE, INITIAL ENCOUNTER: ICD-10-CM

## 2023-04-17 RX ORDER — TOPIRAMATE 100 MG/1
100 TABLET, FILM COATED ORAL NIGHTLY
Qty: 90 TABLET | Refills: 0 | Status: SHIPPED | OUTPATIENT
Start: 2023-04-17

## 2023-04-17 RX ORDER — TIZANIDINE 4 MG/1
4 TABLET ORAL EVERY 8 HOURS PRN
Qty: 30 TABLET | Refills: 2 | Status: SHIPPED | OUTPATIENT
Start: 2023-04-17

## 2023-04-17 RX ORDER — LISINOPRIL 5 MG/1
5 TABLET ORAL DAILY
Qty: 90 TABLET | Refills: 1 | Status: SHIPPED | OUTPATIENT
Start: 2023-04-17
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
## 2023-04-17 NOTE — TELEPHONE ENCOUNTER
Pt called and stated she has been having some issues with her BP since stopping her lisinopril.  Stated for the last 2 weeks she has checked it 2 times daily and it has been running 132//94.  Please advise

## 2023-05-22 RX ORDER — TRAZODONE HYDROCHLORIDE 50 MG/1
TABLET ORAL
Qty: 132 TABLET | Refills: 0 | Status: SHIPPED | OUTPATIENT
Start: 2023-05-22 | End: 2023-05-22

## 2023-05-22 RX ORDER — LEVOFLOXACIN 500 MG/1
TABLET, FILM COATED ORAL
Qty: 7 TABLET | Refills: 0 | OUTPATIENT
Start: 2023-05-22

## 2023-05-22 RX ORDER — TRAZODONE HYDROCHLORIDE 50 MG/1
TABLET ORAL
Qty: 132 TABLET | Refills: 0 | Status: SHIPPED | OUTPATIENT
Start: 2023-05-22

## 2023-05-22 RX ORDER — PANTOPRAZOLE SODIUM 40 MG/1
TABLET, DELAYED RELEASE ORAL
Qty: 132 TABLET | Refills: 0 | Status: SHIPPED | OUTPATIENT
Start: 2023-05-22

## 2023-05-30 RX ORDER — ESCITALOPRAM OXALATE 10 MG/1
TABLET ORAL
Qty: 60 TABLET | Refills: 0 | Status: SHIPPED | OUTPATIENT
Start: 2023-05-30

## 2023-06-14 ENCOUNTER — TELEPHONE (OUTPATIENT)
Dept: FAMILY MEDICINE CLINIC | Facility: CLINIC | Age: 53
End: 2023-06-14

## 2023-06-14 NOTE — TELEPHONE ENCOUNTER
Caller: Hanh Mederos    Relationship to patient: Self    Best call back number: [phone]    Patient is needing: THE PATIENT SCHEDULED AN APPOINTMENT TO BE SEEN ON FRIDAY, 06/16, WITH DR. MCMANUS. 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 NO APPOINTMENTS WITH EMILIANA DORANTES WAS FOUND. PLEASE ADVISE THE PATIENT IF THIS NEEDS CHANGED.

## 2023-06-16 ENCOUNTER — OFFICE VISIT (OUTPATIENT)
Dept: FAMILY MEDICINE CLINIC | Facility: CLINIC | Age: 53
End: 2023-06-16
Payer: MEDICARE

## 2023-06-16 VITALS
SYSTOLIC BLOOD PRESSURE: 116 MMHG | HEIGHT: 70 IN | OXYGEN SATURATION: 98 % | TEMPERATURE: 98.6 F | DIASTOLIC BLOOD PRESSURE: 84 MMHG | BODY MASS INDEX: 25.05 KG/M2 | WEIGHT: 175 LBS | HEART RATE: 86 BPM

## 2023-06-16 DIAGNOSIS — M25.60 JOINT STIFFNESS OF MULTIPLE SITES: Primary | ICD-10-CM

## 2023-06-16 DIAGNOSIS — R04.0 EPISTAXIS: ICD-10-CM

## 2023-06-16 NOTE — PROGRESS NOTES
"Chief Complaint  Hand Problem (Stiffness in hands) and Nose Bleed (On Monday, now right nostril hurting)    Subjective        Hanh Mederos presents to [facility]  History of Present Illness  Patient is here today with a new problem.  She states that she has been having intermittent stiffness and swelling in the joints of her fingers for the past several months.  She even had 1 episode where her fingers locked up for several minutes after a long day of using her hands.  This episode improved when she applied moist heat.    Patient is also here for another new problem.  5 days ago she had a mild nosebleed from her right nostril.  She denies any headaches breathing problems or any trauma.  She has not had any repeat nosebleeds since then.  The right nostril is slightly tender on the inside and she would like me to take a look at it    Objective   Vital Signs:  /84   Pulse 86   Temp 98.6 °F (37 °C)   Ht 177.8 cm (70\")   Wt 79.4 kg (175 lb)   SpO2 98%   BMI 25.11 kg/m²   Estimated body mass index is 25.11 kg/m² as calculated from the following:    Height as of this encounter: 177.8 cm (70\").    Weight as of this encounter: 79.4 kg (175 lb).             Physical Exam  Vitals and nursing note reviewed.   Constitutional:       Appearance: Normal appearance. She is well-developed and normal weight.   HENT:      Head: Normocephalic and atraumatic.      Right Ear: External ear normal.      Left Ear: External ear normal.      Nose: Nose normal.      Comments: Just inside the right nares is a 2 mm abrasion on the lateral wall that appears to be healing well  Eyes:      General: No scleral icterus.     Conjunctiva/sclera: Conjunctivae normal.   Pulmonary:      Effort: Pulmonary effort is normal.   Musculoskeletal:         General: No tenderness. Normal range of motion.      Cervical back: Normal range of motion and neck supple.      Right lower leg: No edema.      Left lower leg: No " edema.      Comments: Patient with mild enlargement of a few of the DIP and PIP joints of both hands.  Good  strength.  Normal approximation   Lymphadenopathy:      Cervical: No cervical adenopathy.   Skin:     General: Skin is warm and dry.      Findings: No rash.   Neurological:      Mental Status: She is alert and oriented to person, place, and time.      Sensory: No sensory deficit.      Motor: No weakness.      Coordination: Coordination normal.      Gait: Gait normal.      Deep Tendon Reflexes: Reflexes normal.   Psychiatric:         Mood and Affect: Mood normal.         Behavior: Behavior normal.         Thought Content: Thought content normal.         Judgment: Judgment normal.      Result Review :                   Assessment and Plan   Diagnoses and all orders for this visit:    1. Joint stiffness of multiple sites (Primary)  -     Rheumatoid Factor, Quant    2. Epistaxis    Patient is here today for new problem of joint stiffness in her hands.  I explained to the patient that this is likely due to osteoarthritis.  However I would like to rule out rheumatoid arthritis with labs today.  Patient was advised to strengthen her hands using a stress ball and also use anti-inflammatories, ice, compression wraps as needed.    Abrasion of the right nares is the cause of recent epistaxis.  It appears to be healing well.  Reassurance given.  The patient may use Bactroban ointment minimally for a couple of days at bedtime.  If symptoms persist follow-up.         Follow Up   Return if symptoms worsen or fail to improve.  Patient was given instructions and counseling regarding her condition or for health maintenance advice. Please see specific information pulled into the AVS if appropriate.

## 2023-06-17 LAB — RHEUMATOID FACT SERPL-ACNC: <10 IU/ML

## 2023-07-17 PROBLEM — E03.9 HYPOTHYROID: Status: RESOLVED | Noted: 2023-07-17 | Resolved: 2023-07-17

## 2023-07-17 PROBLEM — I25.10 CORONARY ARTERY DISEASE: Status: RESOLVED | Noted: 2023-07-17 | Resolved: 2023-07-17

## 2023-07-17 PROBLEM — I50.9 CONGESTIVE HEART FAILURE (CHF): Status: RESOLVED | Noted: 2023-07-17 | Resolved: 2023-07-17

## 2023-07-17 PROBLEM — Z12.11 ENCOUNTER FOR SCREENING FOR MALIGNANT NEOPLASM OF COLON: Status: ACTIVE | Noted: 2023-07-17

## 2023-09-07 ENCOUNTER — OFFICE VISIT (OUTPATIENT)
Dept: FAMILY MEDICINE CLINIC | Facility: CLINIC | Age: 53
End: 2023-09-07
Payer: MEDICARE

## 2023-09-07 VITALS
BODY MASS INDEX: 25.2 KG/M2 | SYSTOLIC BLOOD PRESSURE: 122 MMHG | TEMPERATURE: 98.6 F | HEART RATE: 88 BPM | DIASTOLIC BLOOD PRESSURE: 82 MMHG | HEIGHT: 70 IN | OXYGEN SATURATION: 99 % | WEIGHT: 176 LBS

## 2023-09-07 DIAGNOSIS — L85.3 DRY SKIN: ICD-10-CM

## 2023-09-07 DIAGNOSIS — H65.01 RIGHT ACUTE SEROUS OTITIS MEDIA, RECURRENCE NOT SPECIFIED: Primary | ICD-10-CM

## 2023-09-07 DIAGNOSIS — M62.838 MUSCLE SPASM OF BOTH LOWER LEGS: ICD-10-CM

## 2023-09-07 PROCEDURE — 3079F DIAST BP 80-89 MM HG: CPT | Performed by: NURSE PRACTITIONER

## 2023-09-07 PROCEDURE — 99214 OFFICE O/P EST MOD 30 MIN: CPT | Performed by: NURSE PRACTITIONER

## 2023-09-07 PROCEDURE — 3074F SYST BP LT 130 MM HG: CPT | Performed by: NURSE PRACTITIONER

## 2023-09-07 RX ORDER — PREDNISOLONE ACETATE 10 MG/ML
SUSPENSION/ DROPS OPHTHALMIC
COMMUNITY
Start: 2023-09-07

## 2023-09-07 RX ORDER — TIZANIDINE 4 MG/1
4 TABLET ORAL NIGHTLY PRN
Qty: 30 TABLET | Refills: 0 | Status: SHIPPED | OUTPATIENT
Start: 2023-09-07

## 2023-09-07 RX ORDER — METHYLPREDNISOLONE 4 MG/1
TABLET ORAL
Qty: 21 TABLET | Refills: 0 | Status: SHIPPED | OUTPATIENT
Start: 2023-09-07

## 2023-09-07 RX ORDER — LEVOFLOXACIN 500 MG/1
500 TABLET, FILM COATED ORAL DAILY
Qty: 7 TABLET | Refills: 0 | Status: SHIPPED | OUTPATIENT
Start: 2023-09-07

## 2023-09-07 NOTE — PROGRESS NOTES
"Chief Complaint  Headache, Leg Pain (Shekhar horses), and cracked feet    Subjective        Hanh Mederos presents to [facility] PR[facility]  History of Present Illness    Patient is here today with multiple complaints    [place]  Feels like it is from contaminant.  Also having some eye itching.  Went to optometrist and she started her on steroid drops.  Not feeling like sinus infection.    Pressure at top of head.      Charley horses-had last night about 4 am.  Drinking plenty of water.      Cracking in feet-have gotten worse.  -wants to get into podiatry.      BP normal with headache.      Objective   Vital Signs:  /82   Pulse 88   Temp 98.6 °F (37 °C)   Ht 177.8 cm (70\")   Wt 79.8 kg (176 lb)   SpO2 99%   BMI 25.25 kg/m²   Estimated body mass index is 25.25 kg/m² as calculated from the following:    Height as of this encounter: 177.8 cm (70\").    Weight as of this encounter: 79.8 kg (176 lb).             Physical Exam  Constitutional:       Appearance: Normal appearance.   HENT:      Head: Normocephalic and atraumatic.      Right Ear: Tympanic membrane is injected and erythematous. Tympanic membrane has decreased mobility.      Left Ear: Tympanic membrane has decreased mobility.   Cardiovascular:      Rate and Rhythm: Normal rate and regular rhythm.      Pulses: Normal pulses.   Pulmonary:      Effort: Pulmonary effort is normal.      Breath sounds: Normal breath sounds.   Skin:     General: Skin is warm and dry.   Neurological:      Mental Status: She is alert and oriented to person, place, and time.   Psychiatric:         Mood and Affect: Mood normal.         Behavior: Behavior normal.         Thought Content: Thought content normal.         Judgment: Judgment normal.      Result Review :                   Assessment and Plan   Diagnoses and all orders for this visit:    1. Right acute serous otitis media, recurrence not specified (Primary)    2. " Muscle spasm of both lower legs    3. Dry skin    Other orders  -     tiZANidine (ZANAFLEX) 4 MG tablet; Take 1 tablet by mouth At Night As Needed for Muscle Spasms.  Dispense: 30 tablet; Refill: 0  -     methylPREDNISolone (MEDROL) 4 MG dose pack; Take as directed on package instructions.  Dispense: 21 tablet; Refill: 0  -     levoFLOXacin (Levaquin) 500 MG tablet; Take 1 tablet by mouth Daily.  Dispense: 7 tablet; Refill: 0    Treating for ear infection with antibiotic.  Use steroid pack for inflammation of sinuses.    We discussed doing labs for further evaluation of muscle spasm.  She would like to trial muscle relaxer first and if no improvement she will let me know we will do labs at that point.  I am okay with this plan    Follow-up if no resolution.         Follow Up   No follow-ups on file.  Patient was given instructions and counseling regarding her condition or for health maintenance advice. Please see specific information pulled into the AVS if appropriate.

## 2023-09-25 ENCOUNTER — OFFICE VISIT (OUTPATIENT)
Dept: FAMILY MEDICINE CLINIC | Facility: CLINIC | Age: 53
End: 2023-09-25

## 2023-09-25 VITALS
DIASTOLIC BLOOD PRESSURE: 72 MMHG | TEMPERATURE: 98.4 F | SYSTOLIC BLOOD PRESSURE: 118 MMHG | HEIGHT: 70 IN | WEIGHT: 176 LBS | OXYGEN SATURATION: 98 % | HEART RATE: 95 BPM | BODY MASS INDEX: 25.2 KG/M2

## 2023-09-25 DIAGNOSIS — Z12.31 ENCOUNTER FOR SCREENING MAMMOGRAM FOR BREAST CANCER: ICD-10-CM

## 2023-09-25 DIAGNOSIS — G43.909 MIGRAINE WITHOUT STATUS MIGRAINOSUS, NOT INTRACTABLE, UNSPECIFIED MIGRAINE TYPE: Primary | ICD-10-CM

## 2023-09-25 PROCEDURE — 3074F SYST BP LT 130 MM HG: CPT | Performed by: NURSE PRACTITIONER

## 2023-09-25 PROCEDURE — 3078F DIAST BP <80 MM HG: CPT | Performed by: NURSE PRACTITIONER
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
## 2023-09-29 RX ORDER — SUCRALFATE 1 G/1
TABLET ORAL
Qty: 180 TABLET | Refills: 0 | Status: SHIPPED | OUTPATIENT
Start: 2023-09-29

## 2023-10-02 RX ORDER — OXYBUTYNIN CHLORIDE 5 MG/1
TABLET ORAL
Qty: 120 TABLET | Refills: 2 | Status: SHIPPED | OUTPATIENT
Start: 2023-10-02

## 2023-10-25 RX ORDER — OXYBUTYNIN CHLORIDE 5 MG/1
TABLET ORAL
Qty: 360 TABLET | Refills: 0 | Status: SHIPPED | OUTPATIENT
Start: 2023-10-25

## 2023-10-26 ENCOUNTER — TELEPHONE (OUTPATIENT)
Dept: FAMILY MEDICINE CLINIC | Facility: CLINIC | Age: 53
End: 2023-10-26

## 2023-10-26 ENCOUNTER — OFFICE VISIT (OUTPATIENT)
Dept: FAMILY MEDICINE CLINIC | Facility: CLINIC | Age: 53
End: 2023-10-26
Payer: MEDICARE

## 2023-10-26 VITALS
SYSTOLIC BLOOD PRESSURE: 110 MMHG | WEIGHT: 174 LBS | BODY MASS INDEX: 24.91 KG/M2 | HEART RATE: 89 BPM | HEIGHT: 70 IN | OXYGEN SATURATION: 99 % | DIASTOLIC BLOOD PRESSURE: 72 MMHG | TEMPERATURE: 98.6 F

## 2023-10-26 DIAGNOSIS — G43.909 MIGRAINE WITHOUT STATUS MIGRAINOSUS, NOT INTRACTABLE, UNSPECIFIED MIGRAINE TYPE: Primary | ICD-10-CM

## 2023-10-26 PROCEDURE — 99213 OFFICE O/P EST LOW 20 MIN: CPT | Performed by: NURSE PRACTITIONER

## 2023-10-26 PROCEDURE — 3074F SYST BP LT 130 MM HG: CPT | Performed by: NURSE PRACTITIONER

## 2023-10-26 PROCEDURE — 3078F DIAST BP <80 MM HG: CPT | Performed by: NURSE PRACTITIONER

## 2023-10-26 RX ORDER — TOPIRAMATE 100 MG/1
150 TABLET, FILM COATED ORAL 2 TIMES DAILY
Qty: 270 TABLET | Refills: 1 | Status: SHIPPED | OUTPATIENT
Start: 2023-10-26

## 2023-10-26 NOTE — TELEPHONE ENCOUNTER
Caller: Hanh Mederos    Relationship: Self    Best call back number: [phone]     What is the best time to reach you: ANYTIME    Who are you requesting to speak with (clinical staff, provider,  specific staff member): CLINICAL    What was the call regarding: PATIENT STATED HER HANDICAP PARISA HAS  AND SHE NEEDS THIS RENEWED.    PATIENT STATED SHE WAS ADVISED TO FILL OUT A FORM WITH HER DOCTOR.    PATIENT IS REQUESTING A DOCTORS NOTE STATING SHE NEEDS ANEW ONE.    PLEASE CALL TO ADVISE.

## 2023-10-26 NOTE — PROGRESS NOTES
"Chief Complaint  Headache    Subjective        Hanh Mederos presents to [facility] PR[facility]ARE  History of Present Illness    Patient is here today for follow up on headache.  Last visit we increased topamax dosing.  States that it wasn't helping.  She states that she accidentally took 300mg with original and that seemed to take care of it for her.  Denies any issues or side effects to medication.         Objective   Vital Signs:  /72   Pulse 89   Temp 98.6 °F (37 °C)   Ht 177.8 cm (70\")   Wt 78.9 kg (174 lb)   SpO2 99%   BMI 24.97 kg/m²   Estimated body mass index is 24.97 kg/m² as calculated from the following:    Height as of this encounter: 177.8 cm (70\").    Weight as of this encounter: 78.9 kg (174 lb).       BMI is within normal parameters. No other follow-up for BMI required.      Physical Exam  Constitutional:       Appearance: Normal appearance.   Neurological:      Mental Status: She is alert and oriented to person, place, and time.   Psychiatric:         Mood and Affect: Mood normal.         Behavior: Behavior normal.         Thought Content: Thought content normal.         Judgment: Judgment normal.        Result Review :                   Assessment and Plan   Diagnoses and all orders for this visit:    1. Migraine without status migrainosus, not intractable, unspecified migraine type (Primary)    Other orders  -     topiramate (TOPAMAX) 100 MG tablet; Take 1.5 tablets by mouth 2 (Two) Times a Day.  Dispense: 270 tablet; Refill: 1      Okay to continue with Topamax 300 mg dosing daily for headache.  If no resolution or continued symptoms follow-up as needed.         Follow Up   Return if symptoms worsen or fail to improve, for Next scheduled follow up.  Patient was given instructions and counseling regarding her condition or for health maintenance advice. Please see specific information pulled into the AVS if appropriate.         "

## 2023-10-27 DIAGNOSIS — Z12.31 ENCOUNTER FOR SCREENING MAMMOGRAM FOR BREAST CANCER: ICD-10-CM

## 2023-10-27 NOTE — PROGRESS NOTES
Please call patient with results of mammogram.  Mammogram was negative for abnormal findings.  Repeat screening yearly recommended.

## 2023-11-01 ENCOUNTER — OFFICE VISIT (OUTPATIENT)
Dept: FAMILY MEDICINE CLINIC | Facility: CLINIC | Age: 53
End: 2023-11-01
Payer: MEDICARE

## 2023-11-01 VITALS
TEMPERATURE: 98.6 F | WEIGHT: 174.6 LBS | DIASTOLIC BLOOD PRESSURE: 76 MMHG | HEIGHT: 70 IN | SYSTOLIC BLOOD PRESSURE: 116 MMHG | BODY MASS INDEX: 25 KG/M2 | OXYGEN SATURATION: 97 % | HEART RATE: 106 BPM

## 2023-11-01 DIAGNOSIS — G43.909 MIGRAINE WITHOUT STATUS MIGRAINOSUS, NOT INTRACTABLE, UNSPECIFIED MIGRAINE TYPE: ICD-10-CM

## 2023-11-01 DIAGNOSIS — F31.81 BIPOLAR 2 DISORDER: Primary | ICD-10-CM

## 2023-11-01 PROCEDURE — 1160F RVW MEDS BY RX/DR IN RCRD: CPT | Performed by: NURSE PRACTITIONER

## 2023-11-01 PROCEDURE — 3078F DIAST BP <80 MM HG: CPT | Performed by: NURSE PRACTITIONER

## 2023-11-01 PROCEDURE — 3074F SYST BP LT 130 MM HG: CPT | Performed by: NURSE PRACTITIONER

## 2023-11-01 PROCEDURE — 1159F MED LIST DOCD IN RCRD: CPT | Performed by: NURSE PRACTITIONER

## 2023-11-01 PROCEDURE — 99213 OFFICE O/P EST LOW 20 MIN: CPT | Performed by: NURSE PRACTITIONER

## 2023-11-01 RX ORDER — ARIPIPRAZOLE 5 MG/1
5 TABLET ORAL DAILY
Qty: 30 TABLET | Refills: 2 | Status: SHIPPED | OUTPATIENT
Start: 2023-11-01

## 2023-11-01 RX ORDER — TOPIRAMATE 100 MG/1
100 TABLET, FILM COATED ORAL 2 TIMES DAILY
Qty: 180 TABLET | Refills: 1 | Status: SHIPPED | OUTPATIENT
Start: 2023-11-01

## 2023-11-01 RX ORDER — ESCITALOPRAM OXALATE 20 MG/1
20 TABLET ORAL DAILY
Qty: 90 TABLET | Refills: 1 | Status: SHIPPED | OUTPATIENT
Start: 2023-11-01

## 2023-11-01 NOTE — PROGRESS NOTES
"Chief Complaint  Headache    Subjective        Hanh Mederos presents to [facility] PR[facility]ARE  History of Present Illness    Patient is here today for follow up on migraines.  On Monday, 10/30 we increased Topamax dose.    She also thinks she is hitting markers for bipolar.    She is losing interest in activities.  She follows routine but only because she forces herself.  She reports he sleep is fine.   Denies any SI or HI.    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    Mother, father, and daughter have bipolar. Other daughter has undiagnosed in her opinion.      PHQ 9 score of 20  RIAZ score of 16  Mood questionnaire is positive 10+    Objective   Vital Signs:  /76   Pulse 106   Temp 98.6 °F (37 °C)   Ht 177.8 cm (70\")   Wt 79.2 kg (174 lb 9.6 oz)   SpO2 97%   BMI 25.05 kg/m²   Estimated body mass index is 25.05 kg/m² as calculated from the following:    Height as of this encounter: 177.8 cm (70\").    Weight as of this encounter: 79.2 kg (174 lb 9.6 oz).               Physical Exam  Constitutional:       Appearance: Normal appearance.   Cardiovascular:      Rate and Rhythm: Normal rate and regular rhythm.      Pulses: Normal pulses.   Pulmonary:      Effort: Pulmonary effort is normal.      Breath sounds: Normal breath sounds.   Skin:     General: Skin is warm and dry.   Neurological:      Mental Status: She is alert.   Psychiatric:         Mood and Affect: Mood normal. Mood is anxious.         Behavior: Behavior normal.         Thought Content: Thought content normal.         Judgment: Judgment normal.        Result Review :                   Assessment and Plan   Diagnoses and all orders for this visit:    1. Bipolar 2 disorder (Primary)    2. Migraine without status migrainosus, not intractable, unspecified migraine type    Other orders  -     escitalopram (LEXAPRO) 20 MG tablet; Take 1 tablet by mouth Daily.  Dispense: 90 tablet; " Refill: 1  -     topiramate (TOPAMAX) 100 MG tablet; Take 1 tablet by mouth 2 (Two) Times a Day.  Dispense: 180 tablet; Refill: 1  -     ARIPiprazole (Abilify) 5 MG tablet; Take 1 tablet by mouth Daily.  Dispense: 30 tablet; Refill: 2    Discussed with patient she is already on medication for mood, but does not seem to be demonstrating well controlled.  We will increase dose of Lexapro.  I am going to decrease dose of Topamax back to 200 daily and add on low-dose of Abilify.  If any side effects or issues with medication notify provider.  If any suicidal homicidal ideations go to the ER.  We will follow-up in 4 weeks for medication management.  She verbalizes understanding and is agreeable.         Follow Up   Return in about 4 weeks (around 11/29/2023), or if symptoms worsen or fail to improve, for Next scheduled follow up.  Patient was given instructions and counseling regarding her condition or for health maintenance advice. Please see specific information pulled into the AVS if appropriate.

## 2023-11-08 ENCOUNTER — TELEPHONE (OUTPATIENT)
Dept: FAMILY MEDICINE CLINIC | Facility: CLINIC | Age: 53
End: 2023-11-08
Payer: MEDICARE

## 2023-11-08 NOTE — TELEPHONE ENCOUNTER
Caller: Hanh Mederos    Relationship: Self    Best call back number: [phone]    What is the best time to reach you: ANY    Who are you requesting to speak with (clinical staff, provider,  specific staff member): NATALIE DORANTES        What was the call regarding:   PATIENT STATED THAT SIDE EFFECTS ON ARIPiprazole (Abilify) 5 MG tablet  ARE CONFUSION, DIZZY, LOSING AND MEMORY. THE BIGGEST ISSUE IS BLOOD PRESSURE DROPPING 80 AND THE 40'S.     PLEASE CALL

## 2023-11-08 NOTE — TELEPHONE ENCOUNTER
Lets try Vraylar in place of Abilify and see if those side effects resolved.  Vraylar is not typically going to cause nearly as many side effects as it is a new medicine.  It does require prior authorization.  If we have any samples she is welcome to come get while we are getting approved.  Thank you.

## 2023-11-10 ENCOUNTER — TELEPHONE (OUTPATIENT)
Dept: FAMILY MEDICINE CLINIC | Facility: CLINIC | Age: 53
End: 2023-11-10
Payer: MEDICARE

## 2023-11-27 ENCOUNTER — OFFICE VISIT (OUTPATIENT)
Dept: FAMILY MEDICINE CLINIC | Facility: CLINIC | Age: 53
End: 2023-11-27
Payer: MEDICARE

## 2023-11-27 VITALS
HEIGHT: 70 IN | HEART RATE: 98 BPM | BODY MASS INDEX: 25.03 KG/M2 | SYSTOLIC BLOOD PRESSURE: 122 MMHG | TEMPERATURE: 98.6 F | DIASTOLIC BLOOD PRESSURE: 78 MMHG | WEIGHT: 174.8 LBS | OXYGEN SATURATION: 98 %

## 2023-11-27 DIAGNOSIS — J01.00 ACUTE MAXILLARY SINUSITIS, RECURRENCE NOT SPECIFIED: Primary | ICD-10-CM

## 2023-11-27 PROCEDURE — 99213 OFFICE O/P EST LOW 20 MIN: CPT | Performed by: NURSE PRACTITIONER

## 2023-11-27 PROCEDURE — 3074F SYST BP LT 130 MM HG: CPT | Performed by: NURSE PRACTITIONER

## 2023-11-27 PROCEDURE — 3078F DIAST BP <80 MM HG: CPT | Performed by: NURSE PRACTITIONER

## 2023-11-27 RX ORDER — BACLOFEN 10 MG/1
10 TABLET ORAL 3 TIMES DAILY PRN
COMMUNITY

## 2023-11-27 RX ORDER — LEVOFLOXACIN 500 MG/1
500 TABLET, FILM COATED ORAL DAILY
Qty: 7 TABLET | Refills: 0 | Status: SHIPPED | OUTPATIENT
Start: 2023-11-27

## 2023-11-27 RX ORDER — IPRATROPIUM BROMIDE 21 UG/1
2 SPRAY, METERED NASAL EVERY 12 HOURS
Qty: 30 ML | Refills: 12 | Status: SHIPPED | OUTPATIENT
Start: 2023-11-27

## 2023-11-27 RX ORDER — RIZATRIPTAN BENZOATE 10 MG/1
10 TABLET ORAL
COMMUNITY
Start: 2023-11-25 | End: 2023-11-27

## 2023-11-27 RX ORDER — METHYLPREDNISOLONE 4 MG/1
TABLET ORAL
COMMUNITY
Start: 2023-11-25

## 2023-11-27 RX ORDER — CODEINE PHOSPHATE/GUAIFENESIN 10-100MG/5
10 LIQUID (ML) ORAL
COMMUNITY
Start: 2023-11-25 | End: 2023-11-28

## 2023-11-27 RX ORDER — BENZONATATE 100 MG/1
100 CAPSULE ORAL 3 TIMES DAILY PRN
Qty: 30 CAPSULE | Refills: 0 | Status: SHIPPED | OUTPATIENT
Start: 2023-11-27
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
## 2023-11-30 ENCOUNTER — TELEPHONE (OUTPATIENT)
Dept: FAMILY MEDICINE CLINIC | Facility: CLINIC | Age: 53
End: 2023-11-30

## 2023-11-30 RX ORDER — TRIAMCINOLONE ACETONIDE 1 MG/G
1 OINTMENT TOPICAL 2 TIMES DAILY
Qty: 80 G | Refills: 0 | Status: SHIPPED | OUTPATIENT
Start: 2023-11-30

## 2023-11-30 NOTE — TELEPHONE ENCOUNTER
Patient is having difficulty staying asleep at night. States she wakes up around 3am and is unable to get back to sleep and would like to know what you would recommend.    She also is having a problem with extremely bryant skin on her hands that is now cracking and near bleeding.  States she is drinking plenty of water and tried several OTC lotions.  Is there anything you can recommend to help?

## 2023-11-30 NOTE — TELEPHONE ENCOUNTER
Is she still using trazodone as needed? If not helping enough she can try 2 at night  I will send her in a cream to try on top of her daily moisturizer.  I recommend aquaphor or eucerin twice daily otc as well

## 2023-12-06 ENCOUNTER — OFFICE VISIT (OUTPATIENT)
Dept: FAMILY MEDICINE CLINIC | Facility: CLINIC | Age: 53
End: 2023-12-06
Payer: MEDICARE

## 2023-12-06 VITALS
SYSTOLIC BLOOD PRESSURE: 108 MMHG | OXYGEN SATURATION: 99 % | WEIGHT: 169.2 LBS | DIASTOLIC BLOOD PRESSURE: 64 MMHG | TEMPERATURE: 98.6 F | HEIGHT: 70 IN | BODY MASS INDEX: 24.22 KG/M2 | HEART RATE: 90 BPM

## 2023-12-06 DIAGNOSIS — G43.909 MIGRAINE WITHOUT STATUS MIGRAINOSUS, NOT INTRACTABLE, UNSPECIFIED MIGRAINE TYPE: ICD-10-CM

## 2023-12-06 DIAGNOSIS — F31.81 BIPOLAR 2 DISORDER: Primary | ICD-10-CM

## 2023-12-06 PROCEDURE — 3078F DIAST BP <80 MM HG: CPT | Performed by: NURSE PRACTITIONER

## 2023-12-06 PROCEDURE — 3074F SYST BP LT 130 MM HG: CPT | Performed by: NURSE PRACTITIONER

## 2023-12-06 PROCEDURE — 99213 OFFICE O/P EST LOW 20 MIN: CPT | Performed by: NURSE PRACTITIONER

## 2023-12-06 RX ORDER — QUETIAPINE FUMARATE 100 MG/1
100 TABLET, FILM COATED ORAL NIGHTLY
Qty: 30 TABLET | Refills: 2 | Status: SHIPPED | OUTPATIENT
Start: 2023-12-06

## 2023-12-06 NOTE — PROGRESS NOTES
"Chief Complaint  Insomnia    Subjective        Hanh Mederos presents to [facility]  History of Present Illness    Patient reports she gets 40 minutes of sleep total a night.  Gets sleep at 830 and will fall asleep for 15 minutes.  Then is so exhausted and will sleep 10-15 min and then wake up.    Feels very agitated and irritated.  Feels like she is on edge.      Is worried the vraylar isn't working as we hoped.      Currently on lexapro    We tried abilify before trying vraylar.      Denies SI or HI    Is happy about 1 year divorce tomorrow.     Objective   Vital Signs:  /64   Pulse 90   Temp 98.6 °F (37 °C)   Ht 177.8 cm (70\")   Wt 76.7 kg (169 lb 3.2 oz)   SpO2 99%   BMI 24.28 kg/m²   Estimated body mass index is 24.28 kg/m² as calculated from the following:    Height as of this encounter: 177.8 cm (70\").    Weight as of this encounter: 76.7 kg (169 lb 3.2 oz).       BMI is within normal parameters. No other follow-up for BMI required.      Physical Exam  Constitutional:       Appearance: Normal appearance.   Cardiovascular:      Rate and Rhythm: Normal rate and regular rhythm.      Pulses: Normal pulses.   Pulmonary:      Effort: Pulmonary effort is normal.      Breath sounds: Normal breath sounds.   Skin:     General: Skin is warm and dry.   Neurological:      Mental Status: She is alert and oriented to person, place, and time.   Psychiatric:         Mood and Affect: Mood normal. Mood is anxious.         Behavior: Behavior normal.         Thought Content: Thought content normal.         Judgment: Judgment normal.        Result Review :                   Assessment and Plan   Diagnoses and all orders for this visit:    1. Bipolar 2 disorder (Primary)  -     GeneSight - Swab,; Future    2. Migraine without status migrainosus, not intractable, unspecified migraine type    Other orders  -     QUEtiapine (SEROquel) 100 MG tablet; Take 1 tablet by mouth Every Night.  " Dispense: 30 tablet; Refill: 2      We discussed options for trial.  We are getting proceed with GeneSight testing for further evaluation.  We will call with results and any changes needed plan of care.  Patient would like to trial a medicine in the meantime in place of Vraylar.  We will trial Seroquel.  If any issues or side effects notify provider.  If any suicidal homicidal ideations go to the ER.  We will follow-up in 4 weeks for medication management after calling with results of GeneSight and changing medication if indicated.         Follow Up   No follow-ups on file.  Patient was given instructions and counseling regarding her condition or for health maintenance advice. Please see specific information pulled into the AVS if appropriate.

## 2023-12-08 RX ORDER — PANTOPRAZOLE SODIUM 40 MG/1
40 TABLET, DELAYED RELEASE ORAL DAILY
Qty: 132 TABLET | Refills: 0 | Status: SHIPPED | OUTPATIENT
Start: 2023-12-08

## 2023-12-13 ENCOUNTER — TELEPHONE (OUTPATIENT)
Dept: FAMILY MEDICINE CLINIC | Facility: CLINIC | Age: 53
End: 2023-12-13
Payer: MEDICARE

## 2023-12-13 NOTE — TELEPHONE ENCOUNTER
"Please let patient know that I sent her this message on [employer].  \"      Kevin Schafer,     I got your genesight testing back. All antidepressants with exception of wellbutin, prozac and emsam are in use as directed list.  All mood stabilizers with exception of depakote are in use as directed list. I am happy to trial another 2 medications for you, or we can make an in person or tele-health if you have any questions.  Next time you come in I will make sure you get a copy of genesight results.       EMILIANA Vazquez     Please let her know and see what she wants to do.  If she wants to make an appointment to discuss further that's ok  "

## 2023-12-14 ENCOUNTER — TELEPHONE (OUTPATIENT)
Dept: FAMILY MEDICINE CLINIC | Facility: CLINIC | Age: 53
End: 2023-12-14
Payer: MEDICARE

## 2023-12-14 NOTE — TELEPHONE ENCOUNTER
Caller: MAIKEL    Relationship to patient: [facility]-CLINICAL ADMIN    Best call back number: [phone]    REFERENCE NUMBER:[id number]    Patient is needing: MAIKEL FROM [facility] STATED THAT THEY ARE UNABLE TO APPROVE THE AUTHORIZATION FOR THE GENETIC TESTING BASED ON THE INFORMATION PROVIDED AND ADDITIONAL CLINICAL INFORMATION WOULD BE NEEDED. SHE STATED THAT EMILIANA DORANTES WOULD HAVE THE OPTION FOR A PEER TO PEER BEFORE A  FINAL DECISION IS MADE AND IF SHE WOULD LIKE TO SHE HAS UNTIL 12/28/23 TO ACCEPT AND WOULD NEED TO CALL [phone].

## 2023-12-14 NOTE — TELEPHONE ENCOUNTER
Spoke with billing at [employer], she stated that we do not need to do a peer to peer.  Stated [facility] own [facility].  If they need anything they will reach out to us.

## 2023-12-14 NOTE — TELEPHONE ENCOUNTER
Can you please see if you can check into this on [employer] website?  I have never had to do PA on this due to us filling out form to make sure they qualify when you do the testing?  I will call if we need to, but can you see if you can get more information about this?

## 2023-12-14 NOTE — TELEPHONE ENCOUNTER
I'm aware that they are in chart.  I am just hoping you can look in the system and get some clarification that it is covered or what they are asking for so hopefully I do not have to complete the peer to peer.  The original message was requesting that.

## 2023-12-19 RX ORDER — TRAZODONE HYDROCHLORIDE 50 MG/1
50 TABLET ORAL
Qty: 132 TABLET | Refills: 0 | OUTPATIENT
Start: 2023-12-19

## 2023-12-19 RX ORDER — PANTOPRAZOLE SODIUM 40 MG/1
40 TABLET, DELAYED RELEASE ORAL DAILY
Qty: 132 TABLET | Refills: 0 | OUTPATIENT
Start: 2023-12-19

## 2023-12-26 RX ORDER — SUCRALFATE 1 G/1
TABLET ORAL
Qty: 180 TABLET | Refills: 0 | Status: SHIPPED | OUTPATIENT
Start: 2023-12-26

## 2024-01-17 ENCOUNTER — OFFICE VISIT (OUTPATIENT)
Dept: FAMILY MEDICINE CLINIC | Facility: CLINIC | Age: 54
End: 2024-01-17
Payer: MEDICARE

## 2024-01-17 VITALS
TEMPERATURE: 98.6 F | BODY MASS INDEX: 25 KG/M2 | HEIGHT: 70 IN | WEIGHT: 174.6 LBS | OXYGEN SATURATION: 98 % | DIASTOLIC BLOOD PRESSURE: 72 MMHG | HEART RATE: 97 BPM | SYSTOLIC BLOOD PRESSURE: 112 MMHG

## 2024-01-17 DIAGNOSIS — F31.81 BIPOLAR 2 DISORDER: ICD-10-CM

## 2024-01-17 DIAGNOSIS — F43.21 GRIEF REACTION: Primary | ICD-10-CM

## 2024-01-17 PROCEDURE — 3078F DIAST BP <80 MM HG: CPT | Performed by: NURSE PRACTITIONER

## 2024-01-17 PROCEDURE — 99213 OFFICE O/P EST LOW 20 MIN: CPT | Performed by: NURSE PRACTITIONER

## 2024-01-17 PROCEDURE — 3074F SYST BP LT 130 MM HG: CPT | Performed by: NURSE PRACTITIONER

## 2024-01-17 NOTE — PROGRESS NOTES
"Chief Complaint  Hypertension, Hypothyroidism, Depression, Insomnia, Headache, and Knee Pain    Subjective        Hanh Mederos presents to [facility]  History of Present Illness    Patient is here today to discuss loss of ex-.  He  last Monday.  Daughter has jumped off deep end.  Also found out right before yumiko has a 12 year old grandchild she never knew about.  The mother brought child into life.     Starting Grief support tomorrow.  She feels bad that she doesn't feel bad.  Dealing with a lot of anger and issues from how he  and his life before he .      Not doing self care things that she is dealing with.  Also has gained 5 lbs from last visit.      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     Objective   Vital Signs:  /72   Pulse 97   Temp 98.6 °F (37 °C)   Ht 177.8 cm (70\")   Wt 79.2 kg (174 lb 9.6 oz)   SpO2 98%   BMI 25.05 kg/m²   Estimated body mass index is 25.05 kg/m² as calculated from the following:    Height as of this encounter: 177.8 cm (70\").    Weight as of this encounter: 79.2 kg (174 lb 9.6 oz).               Physical Exam  Constitutional:       Appearance: Normal appearance.   Cardiovascular:      Rate and Rhythm: Normal rate and regular rhythm.      Pulses: Normal pulses.   Pulmonary:      Effort: Pulmonary effort is normal.      Breath sounds: Normal breath sounds.   Skin:     General: Skin is warm and dry.   Neurological:      Mental Status: She is alert.   Psychiatric:         Mood and Affect: Mood normal. Mood is anxious. Affect is flat.         Behavior: Behavior normal.         Thought Content: Thought content normal.         Judgment: Judgment normal.        Result Review :                     Assessment and Plan     Diagnoses and all orders for this visit:    1. Grief reaction (Primary)    2. Bipolar 2 disorder      We discussed at length trialing another medication due to bipolar history and grief reaction.  She would " like to trial grief counseling first and we will follow-up on this in 2 weeks.  I discussed that we will have [facility] results to help guide us.  She does not want to proceed with medication at this point.       Follow Up     Return in about 2 weeks (around 1/31/2024) for follow up on mood.  Patient was given instructions and counseling regarding her condition or for health maintenance advice. Please see specific information pulled into the AVS if appropriate.

## 2024-01-18 ENCOUNTER — OFFICE VISIT (OUTPATIENT)
Dept: FAMILY MEDICINE CLINIC | Facility: CLINIC | Age: 54
End: 2024-01-18
Payer: COMMERCIAL

## 2024-01-18 VITALS
HEART RATE: 85 BPM | SYSTOLIC BLOOD PRESSURE: 116 MMHG | OXYGEN SATURATION: 97 % | DIASTOLIC BLOOD PRESSURE: 62 MMHG | TEMPERATURE: 98.6 F | WEIGHT: 177.8 LBS | BODY MASS INDEX: 25.45 KG/M2 | HEIGHT: 70 IN
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
## 2024-01-18 DIAGNOSIS — G44.329 CHRONIC POST-TRAUMATIC HEADACHE, NOT INTRACTABLE: ICD-10-CM

## 2024-01-18 DIAGNOSIS — M25.562 ACUTE PAIN OF LEFT KNEE: ICD-10-CM

## 2024-01-18 RX ORDER — AMITRIPTYLINE HYDROCHLORIDE 50 MG/1
50-100 TABLET, FILM COATED ORAL NIGHTLY
Qty: 60 TABLET | Refills: 2 | Status: SHIPPED | OUTPATIENT
Start: 2024-01-18
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
## 2024-01-31 ENCOUNTER — OFFICE VISIT (OUTPATIENT)
Dept: FAMILY MEDICINE CLINIC | Facility: CLINIC | Age: 54
End: 2024-01-31
Payer: MEDICARE

## 2024-01-31 VITALS
HEIGHT: 70 IN | DIASTOLIC BLOOD PRESSURE: 76 MMHG | HEART RATE: 72 BPM | SYSTOLIC BLOOD PRESSURE: 118 MMHG | TEMPERATURE: 98.3 F | BODY MASS INDEX: 25.11 KG/M2 | WEIGHT: 175.4 LBS | OXYGEN SATURATION: 99 %

## 2024-01-31 DIAGNOSIS — F43.21 GRIEF REACTION: Primary | ICD-10-CM

## 2024-01-31 DIAGNOSIS — F31.81 BIPOLAR 2 DISORDER: ICD-10-CM

## 2024-01-31 PROCEDURE — 99213 OFFICE O/P EST LOW 20 MIN: CPT | Performed by: NURSE PRACTITIONER

## 2024-01-31 PROCEDURE — 3074F SYST BP LT 130 MM HG: CPT | Performed by: NURSE PRACTITIONER

## 2024-01-31 PROCEDURE — 3078F DIAST BP <80 MM HG: CPT | Performed by: NURSE PRACTITIONER

## 2024-01-31 RX ORDER — CYCLOSPORINE 0.5 MG/ML
1 EMULSION OPHTHALMIC 2 TIMES DAILY
Qty: 60 EACH | Refills: 5 | Status: SHIPPED | OUTPATIENT
Start: 2024-01-31

## 2024-01-31 NOTE — PROGRESS NOTES
" Chief Complaint  Med Refill and grief    Subjective        Hanh Mederos presents to [facility]  History of Present Illness    Patient presents today to discuss mood.  We are following up on grief reaction.  Last visit she did not want to start any medication change for mood.  She just wanted to continue medications she was on.  We did complete a GeneSight and all medication she is on is currently on the use as directed list.  She did start grief counseling and grief meeting.    Objective   Vital Signs:  /76   Pulse 72   Temp 98.3 °F (36.8 °C)   Ht 177.8 cm (70\")   Wt 79.6 kg (175 lb 6.4 oz)   SpO2 99%   BMI 25.17 kg/m²   Estimated body mass index is 25.17 kg/m² as calculated from the following:    Height as of this encounter: 177.8 cm (70\").    Weight as of this encounter: 79.6 kg (175 lb 6.4 oz).               Physical Exam  Constitutional:       Appearance: Normal appearance.   Cardiovascular:      Rate and Rhythm: Normal rate and regular rhythm.      Pulses: Normal pulses.   Pulmonary:      Effort: Pulmonary effort is normal.      Breath sounds: Normal breath sounds.   Skin:     General: Skin is warm and dry.   Neurological:      Mental Status: She is alert.   Psychiatric:         Mood and Affect: Mood normal.         Behavior: Behavior normal.         Thought Content: Thought content normal.         Judgment: Judgment normal.        Result Review :                     Assessment and Plan     Diagnoses and all orders for this visit:    1. Grief reaction (Primary)    2. Bipolar 2 disorder    Other orders  -     cycloSPORINE (Restasis MultiDose) 0.05 % ophthalmic emulsion; Apply 1 drop to eye(s) as directed by provider 2 (Two) Times a Day.  Dispense: 60 each; Refill: 5    Patient declines need for adjustment of medication.  She will let me know if she has any worsening symptoms in the next few weeks.      She does report dry eye.  Restasis given for trial.     "     Follow Up     No follow-ups on file.  Patient was given instructions and counseling regarding her condition or for health maintenance advice. Please see specific information pulled into the AVS if appropriate.

## 2024-02-21 ENCOUNTER — OFFICE VISIT (OUTPATIENT)
Dept: ORTHOPEDIC SURGERY | Facility: CLINIC | Age: 54
End: 2024-02-21
Payer: COMMERCIAL

## 2024-02-21 VITALS
SYSTOLIC BLOOD PRESSURE: 100 MMHG | BODY MASS INDEX: 24.62 KG/M2 | HEART RATE: 85 BPM | WEIGHT: 172 LBS | DIASTOLIC BLOOD PRESSURE: 73 MMHG | HEIGHT: 70 IN

## 2024-02-21 DIAGNOSIS — M25.562 LEFT KNEE PAIN, UNSPECIFIED CHRONICITY: Primary | ICD-10-CM

## 2024-02-21 DIAGNOSIS — M94.262 CHONDROMALACIA OF LEFT KNEE: ICD-10-CM

## 2024-02-21 PROCEDURE — 99213 OFFICE O/P EST LOW 20 MIN: CPT | Performed by: NURSE PRACTITIONER

## 2024-02-21 RX ORDER — ASPIRIN 81 MG/1
81 TABLET ORAL DAILY
COMMUNITY
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
## 2024-02-22 ENCOUNTER — OFFICE VISIT (OUTPATIENT)
Dept: FAMILY MEDICINE CLINIC | Facility: CLINIC | Age: 54
End: 2024-02-22
Payer: MEDICARE

## 2024-02-22 VITALS
DIASTOLIC BLOOD PRESSURE: 78 MMHG | TEMPERATURE: 98.4 F | OXYGEN SATURATION: 99 % | HEIGHT: 70 IN | SYSTOLIC BLOOD PRESSURE: 112 MMHG | BODY MASS INDEX: 25.2 KG/M2 | WEIGHT: 176 LBS | HEART RATE: 69 BPM

## 2024-02-22 DIAGNOSIS — J01.90 ACUTE NON-RECURRENT SINUSITIS, UNSPECIFIED LOCATION: Primary | ICD-10-CM

## 2024-02-22 LAB
EXPIRATION DATE: NORMAL
FLUAV AG NPH QL: NEGATIVE
FLUBV AG NPH QL: NEGATIVE
INTERNAL CONTROL: NORMAL
Lab: NORMAL
S PYO AG THROAT QL: NEGATIVE
SARS-COV-2 AG UPPER RESP QL IA.RAPID: NOT DETECTED

## 2024-02-22 RX ORDER — LEVOFLOXACIN 500 MG/1
500 TABLET, FILM COATED ORAL DAILY
Qty: 7 TABLET | Refills: 0 | Status: SHIPPED | OUTPATIENT
Start: 2024-02-22

## 2024-02-22 RX ORDER — PREDNISONE 20 MG/1
TABLET ORAL
Qty: 9 TABLET | Refills: 0 | Status: SHIPPED | OUTPATIENT
Start: 2024-02-22 | End: 2024-02-27

## 2024-02-22 RX ORDER — TRAZODONE HYDROCHLORIDE 50 MG/1
TABLET ORAL
COMMUNITY
Start: 2024-02-21

## 2024-02-22 RX ORDER — DEXTROMETHORPHAN HYDROBROMIDE AND PROMETHAZINE HYDROCHLORIDE 15; 6.25 MG/5ML; MG/5ML
5 SYRUP ORAL 4 TIMES DAILY PRN
Qty: 180 ML | Refills: 0 | Status: SHIPPED | OUTPATIENT
Start: 2024-02-22

## 2024-02-22 NOTE — PROGRESS NOTES
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 She has a headache and sore throat.  She states symptoms have been going on for 2 days.      Denies fever    Objective   Vital Signs:  /78   Pulse 69   Temp 98.4 °F (36.9 °C)   Ht 177.8 cm (70\")   Wt 79.8 kg (176 lb)   SpO2 99%   BMI 25.25 kg/m²   Estimated body mass index is 25.25 kg/m² as calculated from the following:    Height as of this encounter: 177.8 cm (70\").    Weight as of this encounter: 79.8 kg (176 lb).               Physical Exam  Constitutional:       Appearance: Normal appearance.   HENT:      Head: Normocephalic and atraumatic.      Right Ear: Tympanic membrane has decreased mobility.      Left Ear: Tympanic membrane has decreased mobility.      Nose: Rhinorrhea present.      Right Sinus: Maxillary sinus tenderness present. No frontal sinus tenderness.      Left Sinus: Maxillary sinus tenderness present. No frontal sinus tenderness.      Mouth/Throat:      Pharynx: Oropharyngeal exudate (post nasal drainage) present. No posterior oropharyngeal erythema.   Cardiovascular:      Rate and Rhythm: Normal rate and regular rhythm.      Pulses: Normal pulses.   Pulmonary:      Effort: Pulmonary effort is normal.      Breath sounds: Normal breath sounds.   Skin:     General: Skin is warm and dry.   Neurological:      Mental Status: She is alert.   Psychiatric:         Mood and Affect: Mood normal.         Behavior: Behavior normal.         Thought Content: Thought content normal.         Judgment: Judgment normal.        Result Review :            COVID is negative  Influenza A is negative  Influenza B is negative  Strep is negative           Assessment and Plan     Diagnoses and all " orders for this visit:    1. Acute non-recurrent sinusitis, unspecified location (Primary)  -     POCT Influenza A/B  -     POCT SARS-CoV-2 Antigen NURY  -     POCT rapid strep A    Other orders  -     levoFLOXacin (Levaquin) 500 MG tablet; Take 1 tablet by mouth Daily.  Dispense: 7 tablet; Refill: 0  -     predniSONE (DELTASONE) 20 MG tablet; Take 1 tablet by mouth 2 (Two) Times a Day for 3 days, THEN 1 tablet Daily for 3 days.  Dispense: 9 tablet; Refill: 0  -     promethazine-dextromethorphan (PROMETHAZINE-DM) 6.25-15 MG/5ML syrup; Take 5 mL by mouth 4 (Four) Times a Day As Needed for Cough.  Dispense: 180 mL; Refill: 0      Treating signs of sinus infection.  Start antibiotic and steroid. Use cough medication as needed.  Rest as much as possible.  Drink as much fluids as possible.  If no resolution follow up as needed.           Follow Up     No follow-ups on file.  Patient was given instructions and counseling regarding her condition or for health maintenance advice. Please see specific information pulled into the AVS if appropriate.

## 2024-02-23 RX ORDER — QUETIAPINE FUMARATE 100 MG/1
100 TABLET, FILM COATED ORAL NIGHTLY
Qty: 30 TABLET | Refills: 0 | Status: SHIPPED | OUTPATIENT
Start: 2024-02-23

## 2024-02-26 ENCOUNTER — PATIENT ROUNDING (BHMG ONLY) (OUTPATIENT)
Dept: ORTHOPEDIC SURGERY | Facility: CLINIC | Age: 54
End: 2024-02-26
Payer: MEDICARE

## 2024-02-26 NOTE — PROGRESS NOTES
A My-Chart message has been sent to the patient for PATIENT ROUNDING with [facility].

## 2024-03-05 ENCOUNTER — TELEPHONE (OUTPATIENT)
Dept: ORTHOPEDIC SURGERY | Facility: CLINIC | Age: 54
End: 2024-03-05

## 2024-03-08 RX ORDER — PANTOPRAZOLE SODIUM 40 MG/1
40 TABLET, DELAYED RELEASE ORAL DAILY
Qty: 132 TABLET | Refills: 0 | Status: SHIPPED | OUTPATIENT
Start: 2024-03-08

## 2024-03-29 RX ORDER — QUETIAPINE FUMARATE 100 MG/1
100 TABLET, FILM COATED ORAL NIGHTLY
Qty: 30 TABLET | Refills: 0 | Status: SHIPPED | OUTPATIENT
Start: 2024-03-29

## 2024-04-02 ENCOUNTER — TELEPHONE (OUTPATIENT)
Dept: FAMILY MEDICINE CLINIC | Facility: CLINIC | Age: 54
End: 2024-04-02
Payer: MEDICARE

## 2024-04-02 NOTE — TELEPHONE ENCOUNTER
[place] CALLED FROM [facility] REQUESTING ALL NEW PRESCRIPTIONS ON ALL MEDICATIONS     [phone]  EX 4

## 2024-04-03 RX ORDER — PANTOPRAZOLE SODIUM 40 MG/1
40 TABLET, DELAYED RELEASE ORAL DAILY
Qty: 90 TABLET | Refills: 1 | Status: SHIPPED | OUTPATIENT
Start: 2024-04-03

## 2024-04-03 RX ORDER — TOPIRAMATE 100 MG/1
100 TABLET, FILM COATED ORAL 2 TIMES DAILY
Qty: 180 TABLET | Refills: 1 | Status: SHIPPED | OUTPATIENT
Start: 2024-04-03

## 2024-04-03 RX ORDER — ESCITALOPRAM OXALATE 20 MG/1
20 TABLET ORAL DAILY
Qty: 90 TABLET | Refills: 1 | Status: SHIPPED | OUTPATIENT
Start: 2024-04-03

## 2024-04-03 RX ORDER — LISINOPRIL 5 MG/1
5 TABLET ORAL DAILY
Qty: 90 TABLET | Refills: 1 | Status: SHIPPED | OUTPATIENT
Start: 2024-04-03

## 2024-04-03 RX ORDER — SUCRALFATE 1 G/1
1 TABLET ORAL 2 TIMES DAILY
Qty: 180 TABLET | Refills: 1 | Status: SHIPPED | OUTPATIENT
Start: 2024-04-03

## 2024-04-03 RX ORDER — QUETIAPINE FUMARATE 100 MG/1
100 TABLET, FILM COATED ORAL NIGHTLY
Qty: 90 TABLET | Refills: 1 | Status: SHIPPED | OUTPATIENT
Start: 2024-04-03

## 2024-04-03 RX ORDER — CYCLOSPORINE 0.5 MG/ML
1 EMULSION OPHTHALMIC 2 TIMES DAILY
Qty: 60 EACH | Refills: 5 | Status: SHIPPED | OUTPATIENT
Start: 2024-04-03

## 2024-04-03 RX ORDER — LEVOTHYROXINE SODIUM 0.07 MG/1
75 TABLET ORAL DAILY
Qty: 90 TABLET | Refills: 1 | Status: SHIPPED | OUTPATIENT
Start: 2024-04-03

## 2024-04-03 RX ORDER — OXYBUTYNIN CHLORIDE 5 MG/1
5 TABLET ORAL 4 TIMES DAILY
Qty: 360 TABLET | Refills: 1 | Status: SHIPPED | OUTPATIENT
Start: 2024-04-03

## 2024-04-03 RX ORDER — AMITRIPTYLINE HYDROCHLORIDE 50 MG/1
50-100 TABLET, FILM COATED ORAL NIGHTLY
Qty: 180 TABLET | Refills: 1 | Status: SHIPPED | OUTPATIENT
Start: 2024-04-03

## 2024-04-12 ENCOUNTER — TELEPHONE (OUTPATIENT)
Dept: FAMILY MEDICINE CLINIC | Facility: CLINIC | Age: 54
End: 2024-04-12

## 2024-04-12 NOTE — TELEPHONE ENCOUNTER
[facility] staff attempted to follow warm transfer process and was unsuccessful     Caller: Hanh Mederos    Relationship to patient: Self    Best call back number: [phone]     Patient is needing: PATIENT WAS CALLING IN WITH SAME DAY SYMPTOMS: SORE THROAT.    [facility] WAS UNABLE TO FIND ANY APPOINTMENTS TODAY. PATIENT WAS OKAY WITH WAITING UNTIL NEXT WEEK. [facility] WAS UNABLE TO SCHEDULE DUE TO SAME DAY SYMPTOMS. PLEASE CALL PATIENT TO GET APPOINTMENT SCHEDULED.      THANK YOU.

## 2024-04-15 ENCOUNTER — OFFICE VISIT (OUTPATIENT)
Dept: FAMILY MEDICINE CLINIC | Facility: CLINIC | Age: 54
End: 2024-04-15
Payer: MEDICARE

## 2024-04-15 VITALS
WEIGHT: 174.6 LBS | TEMPERATURE: 98 F | SYSTOLIC BLOOD PRESSURE: 108 MMHG | HEIGHT: 70 IN | OXYGEN SATURATION: 99 % | DIASTOLIC BLOOD PRESSURE: 70 MMHG | BODY MASS INDEX: 25 KG/M2 | HEART RATE: 81 BPM

## 2024-04-15 DIAGNOSIS — J02.9 SORE THROAT: Primary | ICD-10-CM

## 2024-04-15 DIAGNOSIS — J10.1 INFLUENZA A: ICD-10-CM

## 2024-04-15 LAB
EXPIRATION DATE: ABNORMAL
EXPIRATION DATE: NORMAL
EXPIRATION DATE: NORMAL
FLUAV AG NPH QL: POSITIVE
FLUBV AG NPH QL: NEGATIVE
INTERNAL CONTROL: ABNORMAL
INTERNAL CONTROL: NORMAL
INTERNAL CONTROL: NORMAL
Lab: ABNORMAL
Lab: NORMAL
Lab: NORMAL
S PYO AG THROAT QL: NEGATIVE
SARS-COV-2 AG UPPER RESP QL IA.RAPID: NOT DETECTED

## 2024-04-15 PROCEDURE — 87804 INFLUENZA ASSAY W/OPTIC: CPT | Performed by: NURSE PRACTITIONER

## 2024-04-15 PROCEDURE — 99213 OFFICE O/P EST LOW 20 MIN: CPT | Performed by: NURSE PRACTITIONER

## 2024-04-15 PROCEDURE — 3078F DIAST BP <80 MM HG: CPT | Performed by: NURSE PRACTITIONER

## 2024-04-15 PROCEDURE — 87426 SARSCOV CORONAVIRUS AG IA: CPT | Performed by: NURSE PRACTITIONER

## 2024-04-15 PROCEDURE — 3074F SYST BP LT 130 MM HG: CPT | Performed by: NURSE PRACTITIONER

## 2024-04-15 PROCEDURE — 87880 STREP A ASSAY W/OPTIC: CPT | Performed by: NURSE PRACTITIONER

## 2024-04-15 RX ORDER — TRAZODONE HYDROCHLORIDE 50 MG/1
50 TABLET ORAL
COMMUNITY
Start: 2024-04-05

## 2024-04-15 NOTE — PROGRESS NOTES
"Chief Complaint  Sore Throat    Subjective        Hanh Mederos presents to [facility]  History of Present Illness    Patient is here today with complaint of sore throat that started Friday morning.      Reports sore throat, nose bleed, cough  Denies fever, fatigue, ear pain pressure, nausea, vomiting, diarrhea.       Objective   Vital Signs:  /70   Pulse 81   Temp 98 °F (36.7 °C)   Ht 177.8 cm (70\")   Wt 79.2 kg (174 lb 9.6 oz)   SpO2 99%   BMI 25.05 kg/m²   Estimated body mass index is 25.05 kg/m² as calculated from the following:    Height as of this encounter: 177.8 cm (70\").    Weight as of this encounter: 79.2 kg (174 lb 9.6 oz).               Physical Exam  Constitutional:       Appearance: Normal appearance.   Cardiovascular:      Rate and Rhythm: Normal rate and regular rhythm.      Pulses: Normal pulses.   Pulmonary:      Effort: Pulmonary effort is normal.      Breath sounds: Normal breath sounds.   Skin:     General: Skin is warm and dry.   Neurological:      Mental Status: She is alert.   Psychiatric:         Mood and Affect: Mood normal.         Behavior: Behavior normal.         Thought Content: Thought content normal.         Judgment: Judgment normal.        Result Review :          INFLUENZA A is POSITIVE  Influenza B is negative  COVID is negative  Strep is negative           Assessment and Plan     Diagnoses and all orders for this visit:    1. Sore throat (Primary)  -     POCT rapid strep A  -     POCT SARS-CoV-2 Antigen NURY  -     POC Influenza A / B    2. Influenza A    Patient declines Tamiflu.  She is going to increase rest is much as possible and drink plenty of fluids.  If any worsening symptoms she will notify provider and we can prescribe medication as needed.  She verbalized understanding and is agreeable.         Follow Up     No follow-ups on file.  Patient was given instructions and counseling regarding her condition or for health " maintenance advice. Please see specific information pulled into the AVS if appropriate.

## 2024-05-02 RX ORDER — TRAZODONE HYDROCHLORIDE 50 MG/1
50 TABLET ORAL
Qty: 72 TABLET | Refills: 0 | Status: SHIPPED | OUTPATIENT
Start: 2024-05-02

## 2024-05-06 ENCOUNTER — OFFICE VISIT (OUTPATIENT)
Dept: FAMILY MEDICINE CLINIC | Facility: CLINIC | Age: 54
End: 2024-05-06
Payer: MEDICARE

## 2024-05-06 VITALS
BODY MASS INDEX: 24.94 KG/M2 | OXYGEN SATURATION: 99 % | WEIGHT: 174.2 LBS | DIASTOLIC BLOOD PRESSURE: 80 MMHG | TEMPERATURE: 97.7 F | SYSTOLIC BLOOD PRESSURE: 114 MMHG | HEART RATE: 94 BPM | HEIGHT: 70 IN

## 2024-05-06 DIAGNOSIS — Z86.718 HISTORY OF DVT (DEEP VEIN THROMBOSIS): ICD-10-CM

## 2024-05-06 DIAGNOSIS — M79.605 ACUTE PAIN OF LEFT LOWER EXTREMITY: Primary | ICD-10-CM

## 2024-05-06 PROCEDURE — 3074F SYST BP LT 130 MM HG: CPT | Performed by: NURSE PRACTITIONER

## 2024-05-06 PROCEDURE — 3079F DIAST BP 80-89 MM HG: CPT | Performed by: NURSE PRACTITIONER

## 2024-05-06 PROCEDURE — 99213 OFFICE O/P EST LOW 20 MIN: CPT | Performed by: NURSE PRACTITIONER

## 2024-05-07 DIAGNOSIS — M79.605 ACUTE PAIN OF LEFT LOWER EXTREMITY: ICD-10-CM

## 2024-05-07 DIAGNOSIS — Z86.718 HISTORY OF DVT (DEEP VEIN THROMBOSIS): ICD-10-CM

## 2024-06-17 ENCOUNTER — OFFICE VISIT (OUTPATIENT)
Dept: FAMILY MEDICINE CLINIC | Facility: CLINIC | Age: 54
End: 2024-06-17
Payer: MEDICARE

## 2024-06-17 VITALS
DIASTOLIC BLOOD PRESSURE: 68 MMHG | WEIGHT: 178.8 LBS | OXYGEN SATURATION: 98 % | HEART RATE: 91 BPM | HEIGHT: 70 IN | SYSTOLIC BLOOD PRESSURE: 96 MMHG | BODY MASS INDEX: 25.6 KG/M2

## 2024-06-17 DIAGNOSIS — Z11.1 SCREENING-PULMONARY TB: Primary | ICD-10-CM

## 2024-06-17 DIAGNOSIS — S92.502D CLOSED FRACTURE OF PHALANX OF LEFT THIRD TOE WITH ROUTINE HEALING, SUBSEQUENT ENCOUNTER: ICD-10-CM

## 2024-06-17 PROCEDURE — 1125F AMNT PAIN NOTED PAIN PRSNT: CPT | Performed by: NURSE PRACTITIONER

## 2024-06-17 PROCEDURE — 3078F DIAST BP <80 MM HG: CPT | Performed by: NURSE PRACTITIONER

## 2024-06-17 PROCEDURE — 99213 OFFICE O/P EST LOW 20 MIN: CPT | Performed by: NURSE PRACTITIONER

## 2024-06-17 PROCEDURE — 3074F SYST BP LT 130 MM HG: CPT | Performed by: NURSE PRACTITIONER

## 2024-06-17 NOTE — PROGRESS NOTES
"Chief Complaint  Foot Injury (Hairline fracture cannot rita tape cold cause more complications, fell at home and when she fell she rolled to the right and right elbow went into rib and pt felt and heard a crack.  ) and TB Test (Needs TB Xray for volunteering. )    Subjective        Hanh Mederos presents to [facility]  History of Present Illness    Patient presents today for ER follow-up.  She went to ER in June 8th and was diagnosed with fracture of third middle toe.    Medical Decision Making  X-rays did demonstrate a fracture of the third middle phalanx but no fracture of the metatarsals. Patient was reassured by this and is happy to be discharged home in stable condition. She was advised to use rest, ice and elevation. We offered to rita tape her toes but she prefers to just wear her sneakers for protection.     Still feeling sore.  Slipped off curb and put elbow in curb.  No issues with breathing.      Doesn't need anything for pain currently.       She is also requesting an x-ray for screening for TB    Objective   Vital Signs:  BP 96/68   Pulse 91   Ht 177.8 cm (70\")   Wt 81.1 kg (178 lb 12.8 oz)   SpO2 98%   BMI 25.66 kg/m²   Estimated body mass index is 25.66 kg/m² as calculated from the following:    Height as of this encounter: 177.8 cm (70\").    Weight as of this encounter: 81.1 kg (178 lb 12.8 oz).               Physical Exam  Constitutional:       Appearance: Normal appearance.   Cardiovascular:      Rate and Rhythm: Normal rate and regular rhythm.      Pulses: Normal pulses.   Pulmonary:      Effort: Pulmonary effort is normal.      Breath sounds: Normal breath sounds.   Skin:     General: Skin is warm and dry.   Neurological:      Mental Status: She is alert.   Psychiatric:         Mood and Affect: Mood normal.         Behavior: Behavior normal.         Thought Content: Thought content normal.         Judgment: Judgment normal.        Result Review " :                     Assessment and Plan     Diagnoses and all orders for this visit:    1. Screening-pulmonary TB (Primary)  -     XR Chest 2 View; Future    2. Closed fracture of phalanx of left third toe with routine healing, subsequent encounter    Any issues with healing notify provider.  May buddy tape as needed but doing well overall    Will obtain x-ray for screening for pulmonary TB.  Will call with results any changes needed plan of care  Patient to follow-up next month for Medicare wellness.  Follow-up sooner if needed           Follow Up     Return in about 6 weeks (around 7/29/2024) for Medicare Wellness.  Patient was given instructions and counseling regarding her condition or for health maintenance advice. Please see specific information pulled into the AVS if appropriate.

## 2024-06-18 DIAGNOSIS — Z11.1 SCREENING-PULMONARY TB: ICD-10-CM

## 2024-06-19 NOTE — PROGRESS NOTES
Patient know that chest x-ray shows no acute cardio pulmonary process.  Patient may print out on [employer] or come to  if she needs.

## 2024-07-03 RX ORDER — TRAZODONE HYDROCHLORIDE 50 MG/1
50 TABLET ORAL
Qty: 72 TABLET | Refills: 0 | Status: SHIPPED | OUTPATIENT
Start: 2024-07-03

## 2024-07-08 ENCOUNTER — OFFICE VISIT (OUTPATIENT)
Dept: FAMILY MEDICINE CLINIC | Facility: CLINIC | Age: 54
End: 2024-07-08
Payer: MEDICARE

## 2024-07-08 VITALS
HEIGHT: 70 IN | HEART RATE: 87 BPM | SYSTOLIC BLOOD PRESSURE: 104 MMHG | OXYGEN SATURATION: 99 % | WEIGHT: 174.8 LBS | BODY MASS INDEX: 25.03 KG/M2 | DIASTOLIC BLOOD PRESSURE: 82 MMHG

## 2024-07-08 DIAGNOSIS — M54.6 ACUTE BILATERAL THORACIC BACK PAIN: ICD-10-CM

## 2024-07-08 DIAGNOSIS — M54.50 ACUTE BILATERAL LOW BACK PAIN WITHOUT SCIATICA: ICD-10-CM

## 2024-07-08 DIAGNOSIS — M79.621 PAIN OF RIGHT UPPER ARM: ICD-10-CM

## 2024-07-08 DIAGNOSIS — S39.92XA INJURY OF COCCYX, INITIAL ENCOUNTER: ICD-10-CM

## 2024-07-08 DIAGNOSIS — M79.621 PAIN OF RIGHT UPPER ARM: Primary | ICD-10-CM

## 2024-07-08 PROCEDURE — 3079F DIAST BP 80-89 MM HG: CPT | Performed by: NURSE PRACTITIONER

## 2024-07-08 PROCEDURE — 1125F AMNT PAIN NOTED PAIN PRSNT: CPT | Performed by: NURSE PRACTITIONER

## 2024-07-08 PROCEDURE — 99214 OFFICE O/P EST MOD 30 MIN: CPT | Performed by: NURSE PRACTITIONER

## 2024-07-08 PROCEDURE — 3074F SYST BP LT 130 MM HG: CPT | Performed by: NURSE PRACTITIONER

## 2024-07-08 RX ORDER — HYDROCODONE BITARTRATE AND ACETAMINOPHEN 5; 325 MG/1; MG/1
1-2 TABLET ORAL EVERY 6 HOURS PRN
Qty: 30 TABLET | Refills: 0 | Status: SHIPPED | OUTPATIENT
Start: 2024-07-08

## 2024-07-08 NOTE — PROGRESS NOTES
Please call patient with results of x-rays.      X-ray of right humerus neck shows no fracture or dislocation.    X-ray of sacrum and coccyx shows no acute fracture but is difficult to evaluate.    X-ray of the lumbar show spine shows multiple age-indeterminate mild compression deformities throughout.    X-ray of thoracic spine shows very mild, age determinate height loss of mid thoracic body.    Would recommend physical therapy if symptoms persist.  If symptoms persist of pain in tailbone a pelvis CT would be recommended.

## 2024-07-08 NOTE — PROGRESS NOTES
"Chief Complaint  Fall (Fell in the bathroom landed on back and was unconscious woke up nauseated dizzy and had a cold sweat, did not hit head but was in so much pain. )    Subjective        Hanh Mederos presents to [facility]  History of Present Illness    Patient presents today with complaint of fall on June 30  She fell backwards in bathroom.  She reports loss of consciousness and had nausea and dizziness.  Has started back exercise, tiger balm, ice pack, heat pack and feels like pain is getting worse and not better.      Didn't see anyone after it happened.      Initial pain was rate on 8-9 on scale of 1-10  Now pain is staying at 8. States she can do everything she did before it is just painful to do it.     Also having right arm pain. -soreness like an overuse upper arm    From waist down to tailbone. Having trouble sleeping.  On left side having numbness/tingling sensation in left lower extremity    Worsening factors: bending  Alleviating factors: nothing    OTC: alternating tylenol and ibuprofen  Denies incontinence of bowel or bladder      Objective   Vital Signs:  /82   Pulse 87   Ht 177.8 cm (70\")   Wt 79.3 kg (174 lb 12.8 oz)   SpO2 99%   BMI 25.08 kg/m²   Estimated body mass index is 25.08 kg/m² as calculated from the following:    Height as of this encounter: 177.8 cm (70\").    Weight as of this encounter: 79.3 kg (174 lb 12.8 oz).             Physical Exam  Constitutional:       Appearance: Normal appearance.   Cardiovascular:      Rate and Rhythm: Normal rate and regular rhythm.      Pulses: Normal pulses.   Pulmonary:      Effort: Pulmonary effort is normal.      Breath sounds: Normal breath sounds.   Skin:     General: Skin is warm and dry.   Neurological:      Mental Status: She is alert and oriented to person, place, and time.   Psychiatric:         Mood and Affect: Mood normal.         Behavior: Behavior normal.         Thought Content: Thought " content normal.         Judgment: Judgment normal.        Result Review :                     Assessment and Plan     Diagnoses and all orders for this visit:    1. Pain of right upper arm (Primary)  -     XR Humerus Right; Future  -     HYDROcodone-acetaminophen (NORCO) 5-325 MG per tablet; Take 1-2 tablets by mouth Every 6 (Six) Hours As Needed for Moderate Pain.  Dispense: 30 tablet; Refill: 0    2. Injury of coccyx, initial encounter  -     XR Sacrum & Coccyx; Future  -     HYDROcodone-acetaminophen (NORCO) 5-325 MG per tablet; Take 1-2 tablets by mouth Every 6 (Six) Hours As Needed for Moderate Pain.  Dispense: 30 tablet; Refill: 0    3. Acute bilateral low back pain without sciatica  -     XR Spine Lumbar 2 or 3 View; Future  -     HYDROcodone-acetaminophen (NORCO) 5-325 MG per tablet; Take 1-2 tablets by mouth Every 6 (Six) Hours As Needed for Moderate Pain.  Dispense: 30 tablet; Refill: 0    4. Acute bilateral thoracic back pain  -     XR Spine Thoracolumbar 2 View; Future  -     HYDROcodone-acetaminophen (NORCO) 5-325 MG per tablet; Take 1-2 tablets by mouth Every 6 (Six) Hours As Needed for Moderate Pain.  Dispense: 30 tablet; Refill: 0    Will obtain xrays for further evaluation for fracture.  Use of hydrocodone as needed.  If no resolution of symptoms follow up as needed.          Follow Up     No follow-ups on file.  Patient was given instructions and counseling regarding her condition or for health maintenance advice. Please see specific information pulled into the AVS if appropriate.

## 2024-07-25 ENCOUNTER — OFFICE VISIT (OUTPATIENT)
Dept: FAMILY MEDICINE CLINIC | Facility: CLINIC | Age: 54
End: 2024-07-25
Payer: MEDICARE

## 2024-07-25 VITALS
SYSTOLIC BLOOD PRESSURE: 96 MMHG | OXYGEN SATURATION: 96 % | BODY MASS INDEX: 25.05 KG/M2 | DIASTOLIC BLOOD PRESSURE: 66 MMHG | WEIGHT: 175 LBS | HEART RATE: 63 BPM | HEIGHT: 70 IN

## 2024-07-25 DIAGNOSIS — S39.92XA INJURY OF COCCYX, INITIAL ENCOUNTER: ICD-10-CM

## 2024-07-25 DIAGNOSIS — R73.09 ELEVATED GLUCOSE: ICD-10-CM

## 2024-07-25 DIAGNOSIS — G44.329 CHRONIC POST-TRAUMATIC HEADACHE, NOT INTRACTABLE: ICD-10-CM

## 2024-07-25 DIAGNOSIS — E66.3 OVERWEIGHT (BMI 25.0-29.9): ICD-10-CM

## 2024-07-25 DIAGNOSIS — Z13.220 SCREENING FOR LIPID DISORDERS: ICD-10-CM

## 2024-07-25 DIAGNOSIS — Z13.6 ENCOUNTER FOR SCREENING FOR CARDIOVASCULAR DISORDERS: ICD-10-CM

## 2024-07-25 DIAGNOSIS — G43.909 MIGRAINE WITHOUT STATUS MIGRAINOSUS, NOT INTRACTABLE, UNSPECIFIED MIGRAINE TYPE: ICD-10-CM

## 2024-07-25 DIAGNOSIS — F31.81 BIPOLAR 2 DISORDER: ICD-10-CM

## 2024-07-25 DIAGNOSIS — E55.9 VITAMIN D DEFICIENCY: ICD-10-CM

## 2024-07-25 DIAGNOSIS — Z00.00 MEDICARE ANNUAL WELLNESS VISIT, SUBSEQUENT: Primary | ICD-10-CM

## 2024-07-25 RX ORDER — TRAMADOL HYDROCHLORIDE 50 MG/1
50-100 TABLET ORAL EVERY 8 HOURS PRN
Qty: 60 TABLET | Refills: 0 | Status: SHIPPED | OUTPATIENT
Start: 2024-07-25

## 2024-07-25 NOTE — PROGRESS NOTES
Subjective   The ABCs of the Annual Wellness Visit  Medicare Wellness Visit      Hanh Mederos is a 53 y.o. patient who presents for a Medicare Wellness Visit.    The following portions of the patient's history were reviewed and   updated as appropriate: allergies, current medications, past family history, past medical history, past social history, past surgical history, and problem list.    Compared to one year ago, the patient's physical   health is worse.  Compared to one year ago, the patient's mental   health is the same.    Recent Hospitalizations:  She was not admitted to the hospital during the last year.     Current Medical Providers:  Patient Care Team:  Rhys Estrada APRN as PCP - General (Family Medicine)  Bandar Wang MD as Consulting Physician (Gastroenterology)  Camille Perez MA as Medical Assistant    Outpatient Medications Prior to Visit   Medication Sig Dispense Refill    amitriptyline (ELAVIL) 50 MG tablet Take 1-2 tablets by mouth Every Night. 180 tablet 1    aspirin 81 MG EC tablet Take 1 tablet by mouth Daily.      escitalopram (LEXAPRO) 20 MG tablet Take 1 tablet by mouth Daily. 90 tablet 1    levothyroxine (SYNTHROID, LEVOTHROID) 75 MCG tablet Take 1 tablet by mouth Daily. 90 tablet 1    lisinopril (PRINIVIL,ZESTRIL) 5 MG tablet Take 1 tablet by mouth Daily. 90 tablet 1    MULTIPLE VITAMINS-MINERALS ER PO Take  by mouth.      oxybutynin (DITROPAN) 5 MG tablet Take 1 tablet by mouth 4 (Four) Times a Day. 360 tablet 1    pantoprazole (PROTONIX) 40 MG EC tablet Take 1 tablet by mouth Daily. 90 tablet 1    QUEtiapine (SEROquel) 100 MG tablet Take 1 tablet by mouth Every Night. 90 tablet 1    sucralfate (CARAFATE) 1 g tablet Take 1 tablet by mouth 2 (Two) Times a Day. 180 tablet 1    topiramate (TOPAMAX) 100 MG tablet Take 1 tablet by mouth 2 (Two) Times a Day. 180 tablet 1    traZODone (DESYREL) 50 MG tablet TAKE ONE TABLET BY MOUTH AT BEDTIME 72 tablet 0     "HYDROcodone-acetaminophen (NORCO) 5-325 MG per tablet Take 1-2 tablets by mouth Every 6 (Six) Hours As Needed for Moderate Pain. 30 tablet 0     No facility-administered medications prior to visit.     Opioid medication/s are on active medication list.  and I have evaluated her active treatment plan and pain score trends (see table).  There were no vitals filed for this visit.  I have reviewed the chart for potential of high risk medication and harmful drug interactions in the elderly.        Aspirin is on active medication list. Aspirin use is indicated based on review of current medical condition/s. Pros and cons of this therapy have been discussed today. Benefits of this medication outweigh potential harm.  Patient has been encouraged to continue taking this medication.  .      Patient Active Problem List   Diagnosis    Atrial fibrillation    Chest pain    CVA (cerebral vascular accident)    H/O bariatric surgery    Hx of cholecystectomy    Hypothyroidism    Presence of permanent cardiac pacemaker    Seizures    History of DVT (deep vein thrombosis)    History of pulmonary embolus (PE)    Hypertension    Pernicious anemia    Sick sinus syndrome due to SA node dysfunction    Pacemaker at end of battery life    Pacemaker    Seizure disorder    Arteriosclerosis of coronary artery    Congestive heart failure    Constipation    Myocardial infarction    Nausea and vomiting    Encounter for screening for malignant neoplasm of colon    Chondromalacia of left knee     Advance Care Planning (Click this link to access ACP Navigator)  Advance Directive is not on file.  ACP discussion was held with the patient during this visit. Patient does not have an advance directive, information provided.        Objective   Vitals:    07/25/24 1049   BP: 96/66   Pulse: 63   SpO2: 96%   Weight: 79.4 kg (175 lb)   Height: 177.8 cm (70\")       Estimated body mass index is 25.11 kg/m² as calculated from the following:    Height as of this " "encounter: 177.8 cm (70\").    Weight as of this encounter: 79.4 kg (175 lb).    BMI is >= 25 and <30. (Overweight) The following options were offered after discussion;: exercise counseling/recommendations and nutrition counseling/recommendations        Does the patient have evidence of cognitive impairment? No                                                                                                Health  Risk Assessment    Smoking Status:  Social History     Tobacco Use   Smoking Status Never    Passive exposure: Never   Smokeless Tobacco Never     Alcohol Consumption:  Social History     Substance and Sexual Activity   Alcohol Use Never     Fall Risk Screen:  STEADI Fall Risk Assessment was completed, and patient is at HIGH risk for falls. Assessment completed on:2024    Depression Screenin/25/2024    10:47 AM   PHQ-2/PHQ-9 Depression Screening   Little Interest or Pleasure in Doing Things 0-->not at all   Feeling Down, Depressed or Hopeless 0-->not at all   PHQ-9: Brief Depression Severity Measure Score 0     Health Habits and Functional and Cognitive Screenin/25/2024    10:47 AM   Functional & Cognitive Status   Do you have difficulty preparing food and eating? No   Do you have difficulty bathing yourself, getting dressed or grooming yourself? No   Do you have difficulty using the toilet? No   Do you have difficulty moving around from place to place? No   Do you have trouble with steps or getting out of a bed or a chair? No   Current Diet Well Balanced Diet   Dental Exam Up to date   Eye Exam Up to date   Exercise (times per week) 5 times per week   Current Exercises Include Walking   Do you need help using the phone?  No   Are you deaf or do you have serious difficulty hearing?  No   Do you need help to go to places out of walking distance? No   Do you need help shopping? No   Do you need help preparing meals?  No   Do you need help with housework?  No   Do you need help with " laundry? No   Do you need help taking your medications? No   Do you need help managing money? No   Do you ever drive or ride in a car without wearing a seat belt? No   Have you felt unusual stress, anger or loneliness in the last month? Yes   Who do you live with? Alone   If you need help, do you have trouble finding someone available to you? No   Have you been bothered in the last four weeks by sexual problems? No   Do you have difficulty concentrating, remembering or making decisions? Yes             Age-appropriate Screening Schedule:  Refer to the list below for future screening recommendations based on patient's age, sex and/or medical conditions. Orders for these recommended tests are listed in the plan section. The patient has been provided with a written plan.    Health Maintenance List  Health Maintenance   Topic Date Due    BMI FOLLOWUP  07/05/2024    INFLUENZA VACCINE  08/01/2024    ANNUAL WELLNESS VISIT  07/25/2025    MAMMOGRAM  10/27/2025    COLORECTAL CANCER SCREENING  01/28/2032    TDAP/TD VACCINES (3 - Td or Tdap) 06/28/2033    HEPATITIS C SCREENING  Completed    COVID-19 Vaccine  Completed    Pneumococcal Vaccine 0-64  Completed    ZOSTER VACCINE  Completed    PAP SMEAR  Discontinued                                                                                                                                                CMS Preventative Services Quick Reference  Risk Factors Identified During Encounter     BMI is >= 25 and <30. (Overweight) The following options were offered after discussion;: exercise counseling/recommendations and nutrition counseling/recommendations    The above risks/problems have been discussed with the patient.  Pertinent information has been shared with the patient in the After Visit Summary.  An After Visit Summary and PPPS were made available to the patient.    Follow Up:   Next Medicare Wellness visit to be scheduled in 1 year.         Additional E&M Note during same  "encounter follows:  Patient has additional, significant, and separately identifiable condition(s)/problem(s) that require work above and beyond the Medicare Wellness Visit     Chief Complaint  Medicare Wellness-subsequent    Subjective   HPI  Hanh is also being seen today for additional medical problem/s.          Insomnia: working well for her.     Depression/Anxiety: feels like there are times where she feels like memory isn't where it used to be.    Increased issues with anxiety  MMSE score of 29    Hypertension: well managed with medication    Incontinence: oxybutyin     Still having pain from the fractures but wants something not as strong as hydrocodone        Objective   Vital Signs:  BP 96/66   Pulse 63   Ht 177.8 cm (70\")   Wt 79.4 kg (175 lb)   SpO2 96%   BMI 25.11 kg/m²   Physical Exam  Constitutional:       Appearance: Normal appearance.   Cardiovascular:      Rate and Rhythm: Normal rate and regular rhythm.      Pulses: Normal pulses.   Pulmonary:      Effort: Pulmonary effort is normal.      Breath sounds: Normal breath sounds.   Skin:     General: Skin is warm and dry.   Neurological:      Mental Status: She is alert.   Psychiatric:         Mood and Affect: Mood normal.         Behavior: Behavior normal.         Thought Content: Thought content normal.         Judgment: Judgment normal.                 Assessment and Plan               Medicare annual wellness visit, subsequent    Bipolar 2 disorder  Psychological condition is stable.  Continue current treatment regimen.  Psychological condition  will be reassessed in 6 months.  Chronic post-traumatic headache, not intractable          Migraine without status migrainosus, not intractable, unspecified migraine type  Continue PRN medication      Screening for lipid disorders  Patient to return next week fasting  Elevated glucose  Rechecking A1c at fasting labs  Overweight (BMI 25.0-29.9)  Patient's (Body mass index is 25.11 kg/m².) indicates that " they are overweight with health conditions that include none . Weight is unchanged. BMI is is above average; BMI management plan is completed. We discussed portion control and increasing exercise.   Injury of coccyx, initial encounter  Given to use in place of hydrocodone for as needed use.  Encounter for screening for cardiovascular disorders    Vitamin D deficiency      Orders Placed This Encounter   Procedures    Comprehensive Metabolic Panel     Standing Status:   Future     Standing Expiration Date:   7/25/2025     Order Specific Question:   Release to patient     Answer:   Routine Release [[id number]]    Lipid Panel     Standing Status:   Future     Standing Expiration Date:   7/25/2025     Order Specific Question:   Release to patient     Answer:   Routine Release [[id number]]    TSH     Standing Status:   Future     Standing Expiration Date:   7/25/2025     Order Specific Question:   Release to patient     Answer:   Routine Release [[id number]]    Hemoglobin A1c     Standing Status:   Future     Standing Expiration Date:   7/25/2025     Order Specific Question:   Release to patient     Answer:   Routine Release [[id number]]    Vitamin B12     Standing Status:   Future     Standing Expiration Date:   7/25/2025     Order Specific Question:   Release to patient     Answer:   Routine Release [[id number]]    Vitamin D,25-Hydroxy     Standing Status:   Future     Standing Expiration Date:   7/25/2025     Order Specific Question:   Release to patient     Answer:   Routine Release [[id number]]    CBC & Differential     Standing Status:   Future     Standing Expiration Date:   7/25/2025     Order Specific Question:   Release to patient     Answer:   Routine Release [[id number]]     New Medications Ordered This Visit   Medications    traMADol (ULTRAM) 50 MG tablet     Sig: Take 1-2 tablets by mouth Every 8 (Eight) Hours As Needed for Moderate Pain.     Dispense:  60 tablet     Refill:  0   1       Follow Up   Return  in about 6 months (around 1/25/2025), or if symptoms worsen or fail to improve, for Next scheduled follow up.  Patient was given instructions and counseling regarding her condition or for health maintenance advice. Please see specific information pulled into the AVS if appropriate.

## 2024-08-20 ENCOUNTER — OFFICE VISIT (OUTPATIENT)
Dept: ORTHOPEDIC SURGERY | Facility: CLINIC | Age: 54
End: 2024-08-20
Payer: MEDICARE

## 2024-08-20 VITALS — BODY MASS INDEX: 25.05 KG/M2 | WEIGHT: 175 LBS | HEIGHT: 70 IN

## 2024-08-20 DIAGNOSIS — M25.562 MECHANICAL KNEE PAIN, LEFT: Primary | ICD-10-CM

## 2024-08-20 DIAGNOSIS — M94.262 CHONDROMALACIA OF LEFT KNEE: ICD-10-CM

## 2024-08-20 DIAGNOSIS — M25.562 LEFT KNEE PAIN, UNSPECIFIED CHRONICITY: ICD-10-CM

## 2024-08-20 PROCEDURE — 99214 OFFICE O/P EST MOD 30 MIN: CPT | Performed by: NURSE PRACTITIONER
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
## 2024-08-21 RX ORDER — TOPIRAMATE 100 MG/1
TABLET, FILM COATED ORAL
Qty: 180 TABLET | Refills: 1 | Status: SHIPPED | OUTPATIENT
Start: 2024-08-21

## 2024-08-21 RX ORDER — ESCITALOPRAM OXALATE 20 MG/1
20 TABLET ORAL DAILY
Qty: 90 TABLET | Refills: 1 | Status: SHIPPED | OUTPATIENT
Start: 2024-08-21

## 2024-08-21 RX ORDER — LISINOPRIL 5 MG/1
5 TABLET ORAL DAILY
Qty: 90 TABLET | Refills: 1 | Status: SHIPPED | OUTPATIENT
Start: 2024-08-21

## 2024-08-21 RX ORDER — SUCRALFATE 1 G/1
TABLET ORAL
Qty: 180 TABLET | Refills: 1 | Status: SHIPPED | OUTPATIENT
Start: 2024-08-21

## 2024-08-21 RX ORDER — LEVOTHYROXINE SODIUM 0.07 MG/1
75 TABLET ORAL DAILY
Qty: 90 TABLET | Refills: 1 | Status: SHIPPED | OUTPATIENT
Start: 2024-08-21

## 2024-08-21 RX ORDER — OXYBUTYNIN CHLORIDE 5 MG/1
5 TABLET ORAL 4 TIMES DAILY
Qty: 360 TABLET | Refills: 1 | Status: SHIPPED | OUTPATIENT
Start: 2024-08-21

## 2024-08-21 RX ORDER — TRAZODONE HYDROCHLORIDE 50 MG/1
50 TABLET ORAL
Qty: 72 TABLET | Refills: 1 | Status: SHIPPED | OUTPATIENT
Start: 2024-08-21

## 2024-09-03 ENCOUNTER — OFFICE VISIT (OUTPATIENT)
Dept: FAMILY MEDICINE CLINIC | Facility: CLINIC | Age: 54
End: 2024-09-03
Payer: MEDICARE

## 2024-09-03 VITALS
HEART RATE: 62 BPM | HEIGHT: 70 IN | SYSTOLIC BLOOD PRESSURE: 108 MMHG | OXYGEN SATURATION: 99 % | DIASTOLIC BLOOD PRESSURE: 70 MMHG | BODY MASS INDEX: 24.88 KG/M2 | TEMPERATURE: 98.3 F | WEIGHT: 173.8 LBS

## 2024-09-03 DIAGNOSIS — L30.9 DERMATITIS: Primary | ICD-10-CM

## 2024-09-03 DIAGNOSIS — E87.6 HYPOKALEMIA: ICD-10-CM

## 2024-09-03 DIAGNOSIS — R53.83 OTHER FATIGUE: ICD-10-CM

## 2024-09-03 DIAGNOSIS — E53.8 B12 DEFICIENCY: ICD-10-CM

## 2024-09-03 PROCEDURE — 3078F DIAST BP <80 MM HG: CPT | Performed by: FAMILY MEDICINE

## 2024-09-03 PROCEDURE — 3074F SYST BP LT 130 MM HG: CPT | Performed by: FAMILY MEDICINE

## 2024-09-03 PROCEDURE — 99214 OFFICE O/P EST MOD 30 MIN: CPT | Performed by: FAMILY MEDICINE

## 2024-09-03 PROCEDURE — 1125F AMNT PAIN NOTED PAIN PRSNT: CPT | Performed by: FAMILY MEDICINE

## 2024-09-03 RX ORDER — TRIAMCINOLONE ACETONIDE 1 MG/G
1 CREAM TOPICAL 2 TIMES DAILY
Qty: 80 G | Refills: 0 | Status: SHIPPED | OUTPATIENT
Start: 2024-09-03

## 2024-09-03 NOTE — PROGRESS NOTES
"Chief Complaint  Rash (Possible mrsa/) and Fatigue    Subjective        Hanh Mederos presents to [facility]  History of Present Illness  History of Present Illness  The patient is a 53-year-old female who presents for an acute concern. She is a patient of EMILIANA Vazquez.    She reports a daily flare-up of a rash characterized by raised bumps on her left arm, the site where a PICC line was attempted three weeks ago. The rash was not present prior to this. Despite trying various over-the-counter treatments, the rash persists. The rash has consistently been red and bumpy. She has a history of MRSA and is concerned about a recurrence. She does not have a fever but admits to experiencing chills and fatigue. Her sleep pattern has changed significantly, from her usual 3 hours to 11 hours recently. She does not have any respiratory symptoms such as cough or drainage. She has been in contact with many people due to her work at the [employer].    Her recent lab results showed normal B12 levels but low potassium. She started taking B12 supplements two days after the test. She also takes levothyroxine and has been on iron pills for several years.       Objective   Vital Signs:  /70   Pulse 62   Temp 98.3 °F (36.8 °C)   Ht 177.8 cm (70\")   Wt 78.8 kg (173 lb 12.8 oz)   SpO2 99%   BMI 24.94 kg/m²   Estimated body mass index is 24.94 kg/m² as calculated from the following:    Height as of this encounter: 177.8 cm (70\").    Weight as of this encounter: 78.8 kg (173 lb 12.8 oz).       BMI is within normal parameters. No other follow-up for BMI required.      Physical Exam   Physical Exam  Patient is alert and in no acute distress.  Lungs are clear.  Heart sounds are normal.  Left antecubital fossa shows a 4 cm area of mild erythema with tiny papules.       Result Review :          Results  Laboratory Studies  Potassium was 3.3. B12 levels were good. TSH was perfect. Hemoglobin was " 11.5.              Assessment and Plan     Diagnoses and all orders for this visit:    1. Dermatitis (Primary)  -     triamcinolone (KENALOG) 0.1 % cream; Apply 1 Application topically to the appropriate area as directed 2 (Two) Times a Day.  Dispense: 80 g; Refill: 0    2. B12 deficiency  -     CBC & Differential  -     Vitamin B12    3. Other fatigue    4. Hypokalemia  -     Basic Metabolic Panel      Assessment & Plan  1. Allergic Dermatitis.  The patient presents with a 4 cm area of mild erythema with tiny papules on the left antecubital fossa, which developed after an attempted PICC line placement three weeks ago. The rash appears to be an allergic reaction, likely due to a substance used on her skin, and does not look infected. A prescription for a potent steroid cream will be sent to her pharmacy to manage the rash.    2. Fatigue.  The patient reports significant fatigue, a notable change from her usual state. She has been experiencing increased tiredness, sleeping up to 11 hours a day, which is unusual for her. This fatigue may be related to her previously low B12 and potassium levels. A recheck of her B12 and potassium levels will be conducted to monitor her condition.    3. Mild Anemia.  The patient has a history of mild anemia, with a recent hemoglobin level of 11.5, which is an improvement from her usual level of around eight. She takes iron pills regularly. A complete blood count (CBC) will be ordered to monitor her anemia.    4. Low Potassium.  The patient had a potassium level of 3.3 a month ago. A basic metabolic panel will be ordered to recheck her potassium levels and ensure they have improved.    5. Low B12.  The patient started taking B12 supplements a month ago after her levels were found to be low. A recheck of her B12 levels will be conducted to ensure they are within the normal range.              Follow Up     No follow-ups on file.  Patient was given instructions and counseling regarding  her condition or for health maintenance advice. Please see specific information pulled into the AVS if appropriate.    Patient or patient representative verbalized consent for the use of Ambient Listening during the visit with  Meredith Lea Kehrer, MD for chart documentation. 9/3/2024  11:33 EDT

## 2024-09-04 LAB
BASOPHILS # BLD AUTO: 0.1 X10E3/UL (ref 0–0.2)
BASOPHILS NFR BLD AUTO: 2 %
BUN SERPL-MCNC: 16 MG/DL (ref 6–24)
BUN/CREAT SERPL: 16 (ref 9–23)
CALCIUM SERPL-MCNC: 8.7 MG/DL (ref 8.7–10.2)
CHLORIDE SERPL-SCNC: 108 MMOL/L (ref 96–106)
CO2 SERPL-SCNC: 19 MMOL/L (ref 20–29)
CREAT SERPL-MCNC: 0.98 MG/DL (ref 0.57–1)
EGFRCR SERPLBLD CKD-EPI 2021: 69 ML/MIN/1.73
EOSINOPHIL # BLD AUTO: 0.1 X10E3/UL (ref 0–0.4)
EOSINOPHIL NFR BLD AUTO: 4 %
ERYTHROCYTE [DISTWIDTH] IN BLOOD BY AUTOMATED COUNT: 13.5 % (ref 11.7–15.4)
GLUCOSE SERPL-MCNC: 92 MG/DL (ref 70–99)
HCT VFR BLD AUTO: 39.6 % (ref 34–46.6)
HGB BLD-MCNC: 12.4 G/DL (ref 11.1–15.9)
IMM GRANULOCYTES # BLD AUTO: 0 X10E3/UL (ref 0–0.1)
IMM GRANULOCYTES NFR BLD AUTO: 0 %
LYMPHOCYTES # BLD AUTO: 0.8 X10E3/UL (ref 0.7–3.1)
LYMPHOCYTES NFR BLD AUTO: 25 %
MCH RBC QN AUTO: 27.9 PG (ref 26.6–33)
MCHC RBC AUTO-ENTMCNC: 31.3 G/DL (ref 31.5–35.7)
MCV RBC AUTO: 89 FL (ref 79–97)
MONOCYTES # BLD AUTO: 0.3 X10E3/UL (ref 0.1–0.9)
MONOCYTES NFR BLD AUTO: 8 %
NEUTROPHILS # BLD AUTO: 2 X10E3/UL (ref 1.4–7)
NEUTROPHILS NFR BLD AUTO: 61 %
PLATELET # BLD AUTO: 179 X10E3/UL (ref 150–450)
POTASSIUM SERPL-SCNC: 4.3 MMOL/L (ref 3.5–5.2)
RBC # BLD AUTO: 4.44 X10E6/UL (ref 3.77–5.28)
SODIUM SERPL-SCNC: 141 MMOL/L (ref 134–144)
VIT B12 SERPL-MCNC: 471 PG/ML (ref 232–1245)
WBC # BLD AUTO: 3.3 X10E3/UL (ref 3.4–10.8)

## 2024-09-18 RX ORDER — PANTOPRAZOLE SODIUM 40 MG/1
40 TABLET, DELAYED RELEASE ORAL
Qty: 90 TABLET | Refills: 1 | Status: SHIPPED | OUTPATIENT
Start: 2024-09-18

## 2024-09-18 RX ORDER — QUETIAPINE FUMARATE 100 MG/1
100 TABLET, FILM COATED ORAL
Qty: 90 TABLET | Refills: 1 | Status: SHIPPED | OUTPATIENT
Start: 2024-09-18

## 2024-10-02 ENCOUNTER — TELEPHONE (OUTPATIENT)
Dept: FAMILY MEDICINE CLINIC | Facility: CLINIC | Age: 54
End: 2024-10-02
Payer: MEDICARE

## 2024-10-02 DIAGNOSIS — Z12.31 ENCOUNTER FOR SCREENING MAMMOGRAM FOR MALIGNANT NEOPLASM OF BREAST: Primary | ICD-10-CM

## 2024-10-02 NOTE — TELEPHONE ENCOUNTER
Caller: Hanh Mederos    Relationship: Self    Best call back number: [phone]     What orders are you requesting (i.e. lab or imaging): MAMMOGRAM    In what timeframe would the patient need to come in: ASAP    Where will you receive your lab/imaging services: [facility]     Additional notes:

## 2024-10-04 ENCOUNTER — OFFICE VISIT (OUTPATIENT)
Dept: FAMILY MEDICINE CLINIC | Facility: CLINIC | Age: 54
End: 2024-10-04
Payer: MEDICARE

## 2024-10-04 VITALS
OXYGEN SATURATION: 97 % | DIASTOLIC BLOOD PRESSURE: 79 MMHG | BODY MASS INDEX: 24.88 KG/M2 | HEIGHT: 70 IN | SYSTOLIC BLOOD PRESSURE: 114 MMHG | WEIGHT: 173.8 LBS | HEART RATE: 72 BPM

## 2024-10-04 DIAGNOSIS — L20.9 ATOPIC DERMATITIS, UNSPECIFIED TYPE: Primary | ICD-10-CM

## 2024-10-04 PROCEDURE — 3078F DIAST BP <80 MM HG: CPT | Performed by: NURSE PRACTITIONER

## 2024-10-04 PROCEDURE — 99213 OFFICE O/P EST LOW 20 MIN: CPT | Performed by: NURSE PRACTITIONER

## 2024-10-04 PROCEDURE — 1125F AMNT PAIN NOTED PAIN PRSNT: CPT | Performed by: NURSE PRACTITIONER

## 2024-10-04 PROCEDURE — 3074F SYST BP LT 130 MM HG: CPT | Performed by: NURSE PRACTITIONER

## 2024-10-04 NOTE — PROGRESS NOTES
"Chief Complaint  Ear Injury (Outside of ear is itching )    Subjective        Hanh Mederos presents to [facility]  History of Present Illness    Patient presents today with complaint of left ear itching.   Left ear itching.  No new detergent, soap, hair stuff.      Has tried rinsing more.  Nothing otc.      Objective   Vital Signs:  /79   Pulse 72   Ht 177.8 cm (70\")   Wt 78.8 kg (173 lb 12.8 oz)   SpO2 97%   BMI 24.94 kg/m²   Estimated body mass index is 24.94 kg/m² as calculated from the following:    Height as of this encounter: 177.8 cm (70\").    Weight as of this encounter: 78.8 kg (173 lb 12.8 oz).    BMI is within normal parameters. No other follow-up for BMI required.      Physical Exam  Constitutional:       Appearance: Normal appearance.   Cardiovascular:      Rate and Rhythm: Normal rate and regular rhythm.      Pulses: Normal pulses.   Pulmonary:      Effort: Pulmonary effort is normal.      Breath sounds: Normal breath sounds.   Skin:     General: Skin is warm and dry.      Findings: Erythema (left pinna) present.   Neurological:      Mental Status: She is alert.   Psychiatric:         Mood and Affect: Mood normal.         Behavior: Behavior normal.         Thought Content: Thought content normal.         Judgment: Judgment normal.        Result Review :                Assessment and Plan   Diagnoses and all orders for this visit:    1. Atopic dermatitis, unspecified type (Primary)        Has leftover triamcinalone.  Will trial back first twice daily before adding any other medication.         Follow Up   No follow-ups on file.  Patient was given instructions and counseling regarding her condition or for health maintenance advice. Please see specific information pulled into the AVS if appropriate.             "

## 2024-10-08 ENCOUNTER — TELEPHONE (OUTPATIENT)
Dept: FAMILY MEDICINE CLINIC | Facility: CLINIC | Age: 54
End: 2024-10-08
Payer: MEDICARE

## 2024-10-08 NOTE — TELEPHONE ENCOUNTER
Caller: Hanh Mederos    Relationship: Self    Best call back number:[phone]     What medication are you requesting: STEROID     What are your current symptoms: RASH IN LEFT    How long have you been experiencing symptoms:     Have you had these symptoms before:    [x] Yes  [] No    Have you been treated for these symptoms before:   [x] Yes  [] No    If a prescription is needed, what is your preferred pharmacy and phone number: [facility] - [place] [address] - [phone] [facility] [phone]      Additional notes: PATIENT CALLING STATING THAT THE CREAM SHE WAS USING DID NOT WORK SHE WOULD LIKE TO KNOW IF SOMETHING ELSE COULD BE PRESCRIBED.

## 2024-10-09 RX ORDER — CLOBETASOL PROPIONATE 0.5 MG/G
1 OINTMENT TOPICAL 2 TIMES DAILY
Qty: 30 G | Refills: 0 | Status: SHIPPED | OUTPATIENT
Start: 2024-10-09

## 2024-10-30 ENCOUNTER — TELEPHONE (OUTPATIENT)
Dept: FAMILY MEDICINE CLINIC | Facility: CLINIC | Age: 54
End: 2024-10-30

## 2024-10-30 RX ORDER — CIPROFLOXACIN 500 MG/1
500 TABLET, FILM COATED ORAL 2 TIMES DAILY
Qty: 14 TABLET | Refills: 0 | Status: SHIPPED | OUTPATIENT
Start: 2024-10-30

## 2024-10-30 NOTE — TELEPHONE ENCOUNTER
Want to send in ciprofloxacin to cover any possibility of bacteria.  However, Cipro should not be taken with her Seroquel.  Would she feel comfortable holding Seroquel while she is on the antibiotic?

## 2024-10-30 NOTE — TELEPHONE ENCOUNTER
Caller: Hanh Mederos    Relationship: Self    Best call back number: [phone]     What medication are you requesting: ANTIBIOTIC     What are your current symptoms: C    How long have you been experiencing symptoms: C    Have you had these symptoms before:    [] Yes  [] No    Have you been treated for these symptoms before:   [] Yes  [] No    If a prescription is needed, what is your preferred pharmacy and phone number: [id number] - [place], [place] - [address] [facility] [phone] [facility] [phone]      Additional notes:  PATIENT STATED THAT THERE HAS BEEN A BACTERIA OUTBREAK IN THE WATER AND IS REQUESTING AN ANTIBIOTIC.

## 2024-11-13 ENCOUNTER — OFFICE VISIT (OUTPATIENT)
Dept: FAMILY MEDICINE CLINIC | Facility: CLINIC | Age: 54
End: 2024-11-13
Payer: MEDICARE

## 2024-11-13 VITALS
SYSTOLIC BLOOD PRESSURE: 100 MMHG | DIASTOLIC BLOOD PRESSURE: 76 MMHG | WEIGHT: 177.4 LBS | OXYGEN SATURATION: 100 % | BODY MASS INDEX: 25.4 KG/M2 | HEART RATE: 91 BPM | HEIGHT: 70 IN

## 2024-11-13 DIAGNOSIS — I10 PRIMARY HYPERTENSION: ICD-10-CM

## 2024-11-13 DIAGNOSIS — H61.21 HEARING LOSS OF RIGHT EAR DUE TO CERUMEN IMPACTION: ICD-10-CM

## 2024-11-13 DIAGNOSIS — D22.9 ATYPICAL MOLE: Primary | ICD-10-CM

## 2024-11-13 NOTE — PROGRESS NOTES
"Chief Complaint  Edema (In legs and feet, was really bad when she was in [place] was doing a lot of standing and her sodium intake was high. ) and Ear Fullness (Right ear fluid she thinks )    Subjective        Hanh Mederos presents to [facility]  History of Present Illness    Had fall on Monday.  Went to ER.  Has follow up with ortho on 12/10    She has reports of swelling in feet and ankles since when in [place].  She states since being home that has improved significantly.   Thinks she had a very high sodium intake and was on her feet 18-20 hours.  BP was slightly elevated for her, but normal range.    Right ear fullness  Had to use portable showers.   Can feel resistance when cleaning out right ear    Has a bump in hair behind left ear.  Noticed 2 weeks ago.  Popped it so noticed a small amount of blood.  Denies any pain, tenderness    Objective   Vital Signs:  /76   Pulse 91   Ht 177.8 cm (70\")   Wt 80.5 kg (177 lb 6.4 oz)   SpO2 100%   BMI 25.45 kg/m²   Estimated body mass index is 25.45 kg/m² as calculated from the following:    Height as of this encounter: 177.8 cm (70\").    Weight as of this encounter: 80.5 kg (177 lb 6.4 oz).            Physical Exam  Constitutional:       Appearance: Normal appearance.   Cardiovascular:      Rate and Rhythm: Normal rate and regular rhythm.      Pulses: Normal pulses.   Pulmonary:      Effort: Pulmonary effort is normal.      Breath sounds: Normal breath sounds.   Skin:     General: Skin is warm and dry.      Comments: Mole in hair behind left ear that is slightly discolored, but circular     Neurological:      Mental Status: She is alert and oriented to person, place, and time.   Psychiatric:         Mood and Affect: Mood normal.         Behavior: Behavior normal.         Thought Content: Thought content normal.         Judgment: Judgment normal.      Result Review :         Ear Cerumen Removal    Date/Time: 11/14/2024 8:49 " AM    Performed by: Rhys Estrada APRN  Authorized by: Rhys Estrada APRN    Anesthesia:  Local Anesthetic: none  Location details: right ear  Patient tolerance: patient tolerated the procedure well with no immediate complications  Procedure type: irrigation   Sedation:  Patient sedated: no            Assessment and Plan   Diagnoses and all orders for this visit:    1. Atypical mole (Primary)  -     Ambulatory Referral to Dermatology    2. Hearing loss of right ear due to cerumen impaction    3. Primary hypertension    Swelling has improved.  Encouraged low-sodium healthy heart diet.  If any recurrence follow-up as needed    Referral to dermatology for mole    Cerumen impaction removed.  If any continuing issues follow-up as needed.         Follow Up   No follow-ups on file.  Patient was given instructions and counseling regarding her condition or for health maintenance advice. Please see specific information pulled into the AVS if appropriate.

## 2024-12-05 ENCOUNTER — HOSPITAL ENCOUNTER (OUTPATIENT)
Dept: MRI IMAGING | Facility: HOSPITAL | Age: 54
Discharge: HOME | End: 2024-12-05
Payer: MEDICARE

## 2024-12-16 RX ORDER — AMITRIPTYLINE HYDROCHLORIDE 50 MG/1
50-100 TABLET ORAL
Qty: 180 TABLET | Refills: 1 | Status: SHIPPED | OUTPATIENT
Start: 2024-12-16

## 2024-12-16 NOTE — TELEPHONE ENCOUNTER
Rx Refill Note  Requested Prescriptions     Pending Prescriptions Disp Refills    amitriptyline (ELAVIL) 50 MG tablet [Pharmacy Med Name: amitriptyline 50 mg tablet] 180 tablet 1     Sig: TAKE ONE TO TWO tablets by MOUTH every NIGHT AT bedtime      Last office visit with prescribing clinician: 11/13/2024   Last telemedicine visit with prescribing clinician: Visit date not found   Next office visit with prescribing clinician: Visit date not found                         Would you like a call back once the refill request has been completed: [] Yes [] No    If the office needs to give you a call back, can they leave a voicemail: [] Yes [] No    Shanae Thomas MA  12/16/24, 12:30 EST

## 2024-12-20 ENCOUNTER — TELEPHONE (OUTPATIENT)
Dept: FAMILY MEDICINE CLINIC | Facility: CLINIC | Age: 54
End: 2024-12-20
Payer: MEDICARE

## 2025-01-02 ENCOUNTER — OFFICE VISIT (OUTPATIENT)
Dept: FAMILY MEDICINE CLINIC | Facility: CLINIC | Age: 55
End: 2025-01-02
Payer: MEDICARE

## 2025-01-02 VITALS
BODY MASS INDEX: 24.85 KG/M2 | DIASTOLIC BLOOD PRESSURE: 68 MMHG | SYSTOLIC BLOOD PRESSURE: 102 MMHG | HEIGHT: 70 IN | OXYGEN SATURATION: 96 % | HEART RATE: 78 BPM | WEIGHT: 173.6 LBS

## 2025-01-02 DIAGNOSIS — M25.472 SWELLING OF BOTH ANKLES: ICD-10-CM

## 2025-01-02 DIAGNOSIS — M25.471 SWELLING OF BOTH ANKLES: ICD-10-CM

## 2025-01-02 DIAGNOSIS — B49 FUNGAL INFECTION: Primary | ICD-10-CM

## 2025-01-02 DIAGNOSIS — D22.9 ATYPICAL MOLE: ICD-10-CM

## 2025-01-02 PROCEDURE — 99214 OFFICE O/P EST MOD 30 MIN: CPT | Performed by: NURSE PRACTITIONER

## 2025-01-02 PROCEDURE — 3078F DIAST BP <80 MM HG: CPT | Performed by: NURSE PRACTITIONER

## 2025-01-02 PROCEDURE — 1125F AMNT PAIN NOTED PAIN PRSNT: CPT | Performed by: NURSE PRACTITIONER

## 2025-01-02 PROCEDURE — 3074F SYST BP LT 130 MM HG: CPT | Performed by: NURSE PRACTITIONER

## 2025-01-02 RX ORDER — CLOTRIMAZOLE AND BETAMETHASONE DIPROPIONATE 10; .64 MG/G; MG/G
1 CREAM TOPICAL 2 TIMES DAILY
Qty: 45 G | Refills: 0 | Status: SHIPPED | OUTPATIENT
Start: 2025-01-02

## 2025-01-02 RX ORDER — CLOBETASOL PROPIONATE 0.5 MG/G
1 AEROSOL, FOAM TOPICAL 2 TIMES DAILY
Qty: 50 G | Refills: 0 | Status: SHIPPED | OUTPATIENT
Start: 2025-01-02

## 2025-01-02 NOTE — PROGRESS NOTES
"Chief Complaint  Suspicious Skin Lesion (Mole on her head in her hair, said derm is taking months to get into now its itching. ) and Leg Swelling (Ankle swelling. )    Subjective        Hanh Mederos presents to [facility]  History of Present Illness    She is here today for follow up on mole.  States it it itching a lot and cannot get into Dermatology until March.      Fungal infection on left foot..      Ankle swelling-improved today  but worried    Objective   Vital Signs:  /68   Pulse 78   Ht 177.8 cm (70\")   Wt 78.7 kg (173 lb 9.6 oz)   SpO2 96%   BMI 24.91 kg/m²   Estimated body mass index is 24.91 kg/m² as calculated from the following:    Height as of this encounter: 177.8 cm (70\").    Weight as of this encounter: 78.7 kg (173 lb 9.6 oz).    BMI is within normal parameters. No other follow-up for BMI required.      Physical Exam  Constitutional:       Appearance: Normal appearance.   Cardiovascular:      Rate and Rhythm: Normal rate and regular rhythm.      Pulses: Normal pulses.   Pulmonary:      Effort: Pulmonary effort is normal.      Breath sounds: Normal breath sounds.   Skin:     General: Skin is warm and dry.   Neurological:      Mental Status: She is alert.   Psychiatric:         Mood and Affect: Mood normal.         Behavior: Behavior normal.         Thought Content: Thought content normal.         Judgment: Judgment normal.        Result Review :                Assessment and Plan   Diagnoses and all orders for this visit:    1. Fungal infection (Primary)    2. Swelling of both ankles    3. Atypical mole    Other orders  -     clobetasol (OLUX) 0.05 % topical foam; Apply 1 Application topically to the appropriate area as directed 2 (Two) Times a Day.  Dispense: 50 g; Refill: 0  -     clotrimazole-betamethasone (LOTRISONE) 1-0.05 % cream; Apply 1 Application topically to the appropriate area as directed 2 (Two) Times a Day.  Dispense: 45 g; Refill: " 0    Discussed with patient that being that the mole is on her scalp I do not generally take things off of scalp and face.  I would like her to keep appointment with dermatology to be set up with them for evaluation of this and regular follow-ups.  She verbalized understanding and is agreeable.    She does not want a treatment for swelling of the ankles as it is improved today.  She will let me know if this worsens.    Will give treatment for fungal infection of the feet.  If no resolution follow-up as needed.         Follow Up   No follow-ups on file.  Patient was given instructions and counseling regarding her condition or for health maintenance advice. Please see specific information pulled into the AVS if appropriate.

## 2025-01-23 ENCOUNTER — OFFICE VISIT (OUTPATIENT)
Dept: FAMILY MEDICINE CLINIC | Facility: CLINIC | Age: 55
End: 2025-01-23
Payer: MEDICARE

## 2025-01-23 VITALS
SYSTOLIC BLOOD PRESSURE: 126 MMHG | DIASTOLIC BLOOD PRESSURE: 86 MMHG | BODY MASS INDEX: 25.77 KG/M2 | HEART RATE: 90 BPM | WEIGHT: 180 LBS | HEIGHT: 70 IN | OXYGEN SATURATION: 98 %

## 2025-01-23 DIAGNOSIS — Z12.11 SCREENING FOR COLON CANCER: ICD-10-CM

## 2025-01-23 DIAGNOSIS — Z86.718 HISTORY OF BLOOD CLOTS: Primary | ICD-10-CM

## 2025-01-23 DIAGNOSIS — Z12.31 ENCOUNTER FOR SCREENING MAMMOGRAM FOR MALIGNANT NEOPLASM OF BREAST: ICD-10-CM

## 2025-01-23 DIAGNOSIS — F31.81 BIPOLAR 2 DISORDER: ICD-10-CM

## 2025-01-23 PROCEDURE — 1125F AMNT PAIN NOTED PAIN PRSNT: CPT | Performed by: NURSE PRACTITIONER

## 2025-01-23 PROCEDURE — 3079F DIAST BP 80-89 MM HG: CPT | Performed by: NURSE PRACTITIONER

## 2025-01-23 PROCEDURE — 99214 OFFICE O/P EST MOD 30 MIN: CPT | Performed by: NURSE PRACTITIONER

## 2025-01-23 PROCEDURE — G2211 COMPLEX E/M VISIT ADD ON: HCPCS | Performed by: NURSE PRACTITIONER

## 2025-01-23 PROCEDURE — 3074F SYST BP LT 130 MM HG: CPT | Performed by: NURSE PRACTITIONER

## 2025-01-23 NOTE — PROGRESS NOTES
"Chief Complaint  Leg Pain (Thinks she has several blood clots in right leg itchy and feels like she has multiple lumps in bottom of right leg. ), Irritable (Thinks her lexapro may need to be upped. ), and GI Problem (Wants colon screening )    Subjective        Hanh Mederos presents to [facility]  History of Present Illness    Patient presents today with multiple complaints to me.    Has been hit and miss with medication so not sure if that Is it, but has be having issues with mood recently.    Lexapro 20mg, seroquel 100mg, topamax only taking at night.   Not taking morning meds in last week or so has been missing them   Had been doing lexapro and seroquel    Right leg.  Had history of blood clots in leg.  Worried about blood clots in leg coming back.    They are starting to itch and worried about that    Wants cologard and mammogram    Objective   Vital Signs:  /86   Pulse 90   Ht 177.8 cm (70\")   Wt 81.6 kg (180 lb)   SpO2 98%   BMI 25.83 kg/m²   Estimated body mass index is 25.83 kg/m² as calculated from the following:    Height as of this encounter: 177.8 cm (70\").    Weight as of this encounter: 81.6 kg (180 lb).            Physical Exam  Constitutional:       Appearance: Normal appearance.   Cardiovascular:      Rate and Rhythm: Normal rate and regular rhythm.      Pulses: Normal pulses.   Pulmonary:      Effort: Pulmonary effort is normal.      Breath sounds: Normal breath sounds.   Skin:     General: Skin is warm and dry.   Neurological:      Mental Status: She is alert.   Psychiatric:         Mood and Affect: Mood is anxious.         Speech: Speech is rapid and pressured.         Behavior: Behavior normal.         Thought Content: Thought content normal.         Judgment: Judgment normal.        Result Review :                Assessment and Plan   Diagnoses and all orders for this visit:    1. History of blood clots (Primary)  -     US Venous Doppler Lower " Extremity Right (duplex); Future    2. Screening for colon cancer  -     Cologuard - Stool, Per Rectum; Future    3. Encounter for screening mammogram for malignant neoplasm of breast  -     Mammo Screening Bilateral With CAD; Future    4. Bipolar 2 disorder    We discussed options for trying a new medicine or getting back on track with current medicine.  She would like to get on track with current medicine and then let me know if symptoms have not resolved.  I am agreeable and think this is a great option for her to trial.  She denied any suicidal homicidal ideations.  She just knows that she wants to get better controlled.    I will order stat ultrasound to rule out blood clot  If worsening pain or new symptoms go to the ER if unable to get in sooner.      Will order for mammogram and Cologuard.  Will call with results any changes needed plan of care               Follow Up   No follow-ups on file.  Patient was given instructions and counseling regarding her condition or for health maintenance advice. Please see specific information pulled into the AVS if appropriate.

## 2025-01-24 DIAGNOSIS — Z86.718 HISTORY OF BLOOD CLOTS: ICD-10-CM

## 2025-01-31 ENCOUNTER — OFFICE VISIT (OUTPATIENT)
Dept: FAMILY MEDICINE CLINIC | Facility: CLINIC | Age: 55
End: 2025-01-31
Payer: MEDICARE

## 2025-01-31 VITALS
HEART RATE: 83 BPM | SYSTOLIC BLOOD PRESSURE: 130 MMHG | BODY MASS INDEX: 25.68 KG/M2 | HEIGHT: 70 IN | OXYGEN SATURATION: 99 % | DIASTOLIC BLOOD PRESSURE: 88 MMHG | WEIGHT: 179.4 LBS

## 2025-01-31 DIAGNOSIS — J06.9 UPPER RESPIRATORY TRACT INFECTION, UNSPECIFIED TYPE: ICD-10-CM

## 2025-01-31 DIAGNOSIS — R09.81 NASAL CONGESTION: Primary | ICD-10-CM

## 2025-01-31 LAB
EXPIRATION DATE: NORMAL
EXPIRATION DATE: NORMAL
FLUAV AG UPPER RESP QL IA.RAPID: NOT DETECTED
FLUBV AG UPPER RESP QL IA.RAPID: NOT DETECTED
INTERNAL CONTROL: NORMAL
INTERNAL CONTROL: NORMAL
Lab: NORMAL
Lab: NORMAL
S PYO AG THROAT QL: NEGATIVE
SARS-COV-2 AG UPPER RESP QL IA.RAPID: NOT DETECTED

## 2025-01-31 PROCEDURE — 3079F DIAST BP 80-89 MM HG: CPT | Performed by: NURSE PRACTITIONER

## 2025-01-31 PROCEDURE — 87880 STREP A ASSAY W/OPTIC: CPT | Performed by: NURSE PRACTITIONER

## 2025-01-31 PROCEDURE — 99213 OFFICE O/P EST LOW 20 MIN: CPT | Performed by: NURSE PRACTITIONER

## 2025-01-31 PROCEDURE — 3075F SYST BP GE 130 - 139MM HG: CPT | Performed by: NURSE PRACTITIONER

## 2025-01-31 PROCEDURE — 1125F AMNT PAIN NOTED PAIN PRSNT: CPT | Performed by: NURSE PRACTITIONER

## 2025-01-31 PROCEDURE — 87428 SARSCOV & INF VIR A&B AG IA: CPT | Performed by: NURSE PRACTITIONER

## 2025-01-31 RX ORDER — BENZONATATE 100 MG/1
100 CAPSULE ORAL 3 TIMES DAILY PRN
Qty: 30 CAPSULE | Refills: 0 | Status: SHIPPED | OUTPATIENT
Start: 2025-01-31

## 2025-01-31 RX ORDER — DEXTROMETHORPHAN HYDROBROMIDE AND PROMETHAZINE HYDROCHLORIDE 15; 6.25 MG/5ML; MG/5ML
5 SYRUP ORAL 4 TIMES DAILY PRN
Qty: 118 ML | Refills: 0 | Status: SHIPPED | OUTPATIENT
Start: 2025-01-31

## 2025-01-31 NOTE — PROGRESS NOTES
"Chief Complaint  Cough (Coughed up phlegm for you. ), Headache, Nasal Congestion (Started last night throat hurts. ), Sore Throat, Chills, Wheezing, and Anorexia    Subjective        Hanh Mederos presents to [facility]  History of Present Illness    Patient presents today with complt of symptoms starting at midnight    Reports cough, headache, nasal congestion, sore throat, chills, wheezing, body aches, shortness of breath with wheezing and laying down, nausea  Denies vomiting, diarrhea    Objective   Vital Signs:  /88   Pulse 83   Ht 177.8 cm (70\")   Wt 81.4 kg (179 lb 6.4 oz)   SpO2 99%   BMI 25.74 kg/m²   Estimated body mass index is 25.74 kg/m² as calculated from the following:    Height as of this encounter: 177.8 cm (70\").    Weight as of this encounter: 81.4 kg (179 lb 6.4 oz).            Physical Exam  Constitutional:       Appearance: Normal appearance.   HENT:      Head: Normocephalic and atraumatic.      Right Ear: Tympanic membrane has decreased mobility.      Left Ear: Tympanic membrane has decreased mobility.      Nose: Rhinorrhea present.      Right Sinus: No maxillary sinus tenderness or frontal sinus tenderness.      Left Sinus: No maxillary sinus tenderness or frontal sinus tenderness.      Mouth/Throat:      Pharynx: Postnasal drip present.   Cardiovascular:      Rate and Rhythm: Normal rate and regular rhythm.      Pulses: Normal pulses.   Pulmonary:      Effort: Pulmonary effort is normal.      Breath sounds: Normal breath sounds.   Skin:     General: Skin is warm and dry.   Neurological:      Mental Status: She is alert.   Psychiatric:         Mood and Affect: Mood normal.         Behavior: Behavior normal.         Thought Content: Thought content normal.         Judgment: Judgment normal.        Result Review :                COVID is negative  Influenza A and B is negative  Strep is negative    Assessment and Plan   Diagnoses and all orders for " this visit:    1. Nasal congestion (Primary)  -     POCT SARS-CoV-2 Antigen NURY + Flu  -     POC Rapid Strep A    2. Upper respiratory tract infection, unspecified type    Other orders  -     benzonatate (Tessalon Perles) 100 MG capsule; Take 1 capsule by mouth 3 (Three) Times a Day As Needed for Cough.  Dispense: 30 capsule; Refill: 0  -     promethazine-dextromethorphan (PROMETHAZINE-DM) 6.25-15 MG/5ML syrup; Take 5 mL by mouth 4 (Four) Times a Day As Needed for Cough.  Dispense: 118 mL; Refill: 0      Discussed likely upper respiratory infection.  Start cough medicine as needed.  Take Tylenol at recommended doses every 4-6 hours as needed.  Rest is much as possible.  Drink as much fluids as possible.  If no resolution of symptoms please follow-up as needed.  If increasing shortness of breath, unable to walk across room, fever not controlled by medication go to the ER.  She verbalized understanding and is agreeable.       Follow Up   No follow-ups on file.  Patient was given instructions and counseling regarding her condition or for health maintenance advice. Please see specific information pulled into the AVS if appropriate.

## 2025-02-11 RX ORDER — TRAZODONE HYDROCHLORIDE 50 MG/1
50 TABLET, FILM COATED ORAL
Qty: 72 TABLET | Refills: 0 | Status: SHIPPED | OUTPATIENT
Start: 2025-02-11

## 2025-03-21 DIAGNOSIS — Z12.31 ENCOUNTER FOR SCREENING MAMMOGRAM FOR MALIGNANT NEOPLASM OF BREAST: ICD-10-CM

## 2025-03-24 RX ORDER — LEVOTHYROXINE SODIUM 75 UG/1
75 TABLET ORAL DAILY
Qty: 90 TABLET | Refills: 1 | Status: SHIPPED | OUTPATIENT
Start: 2025-03-24

## 2025-03-24 RX ORDER — LISINOPRIL 5 MG/1
5 TABLET ORAL DAILY
Qty: 90 TABLET | Refills: 1 | Status: SHIPPED | OUTPATIENT
Start: 2025-03-24

## 2025-03-24 RX ORDER — SUCRALFATE 1 G/1
TABLET ORAL
Qty: 180 TABLET | Refills: 1 | Status: SHIPPED | OUTPATIENT
Start: 2025-03-24

## 2025-03-24 RX ORDER — ESCITALOPRAM OXALATE 20 MG/1
20 TABLET ORAL DAILY
Qty: 90 TABLET | Refills: 1 | Status: SHIPPED | OUTPATIENT
Start: 2025-03-24

## 2025-03-24 NOTE — TELEPHONE ENCOUNTER
Rx Refill Note  Requested Prescriptions     Pending Prescriptions Disp Refills    escitalopram (LEXAPRO) 20 MG tablet [Pharmacy Med Name: escitalopram 20 mg tablet] 90 tablet 1     Sig: TAKE ONE TABLET BY MOUTH EVERY DAY    sucralfate (CARAFATE) 1 g tablet [Pharmacy Med Name: sucralfate 1 gram tablet] 180 tablet 1     Sig: TAKE ONE TABLET BY MOUTH TWICE DAILY MORNING AND BEDTIME    levothyroxine (SYNTHROID, LEVOTHROID) 75 MCG tablet [Pharmacy Med Name: levothyroxine 75 mcg tablet] 90 tablet 1     Sig: TAKE ONE TABLET BY MOUTH EVERY DAY    lisinopril (PRINIVIL,ZESTRIL) 5 MG tablet [Pharmacy Med Name: lisinopril 5 mg tablet] 90 tablet 1     Sig: TAKE ONE TABLET BY MOUTH EVERY DAY      Last office visit with prescribing clinician: 1/31/2025   Last telemedicine visit with prescribing clinician: Visit date not found   Next office visit with prescribing clinician: Visit date not found                         Would you like a call back once the refill request has been completed: [] Yes [] No    If the office needs to give you a call back, can they leave a voicemail: [] Yes [] No    Shanae Thomas MA  03/24/25, 10:30 EDT

## 2025-04-09 RX ORDER — PANTOPRAZOLE SODIUM 40 MG/1
40 TABLET, DELAYED RELEASE ORAL
Qty: 90 TABLET | Refills: 1 | Status: SHIPPED | OUTPATIENT
Start: 2025-04-09

## 2025-04-09 RX ORDER — QUETIAPINE FUMARATE 100 MG/1
100 TABLET, FILM COATED ORAL
Qty: 90 TABLET | Refills: 1 | Status: SHIPPED | OUTPATIENT
Start: 2025-04-09

## 2025-04-09 NOTE — TELEPHONE ENCOUNTER
Rx Refill Note  Requested Prescriptions     Pending Prescriptions Disp Refills    pantoprazole (PROTONIX) 40 MG EC tablet [Pharmacy Med Name: pantoprazole 40 mg tablet,delayed release] 90 tablet 1     Sig: TAKE ONE TABLET BY MOUTH AT BEDTIME    QUEtiapine (SEROquel) 100 MG tablet [Pharmacy Med Name: quetiapine 100 mg tablet] 90 tablet 1     Sig: TAKE ONE TABLET BY MOUTH AT BEDTIME      Last office visit with prescribing clinician: 1/31/2025   Last telemedicine visit with prescribing clinician: Visit date not found   Next office visit with prescribing clinician: Visit date not found                         Would you like a call back once the refill request has been completed: [] Yes [] No    If the office needs to give you a call back, can they leave a voicemail: [] Yes [] No    Shanae Damon MA  04/09/25, 15:31 EDT

## 2025-04-18 RX ORDER — TRAZODONE HYDROCHLORIDE 50 MG/1
50 TABLET ORAL
Qty: 72 TABLET | Refills: 0 | Status: SHIPPED | OUTPATIENT
Start: 2025-04-18

## 2025-05-01 ENCOUNTER — OFFICE VISIT (OUTPATIENT)
Dept: FAMILY MEDICINE CLINIC | Facility: CLINIC | Age: 55
End: 2025-05-01
Payer: MEDICARE

## 2025-05-01 VITALS
SYSTOLIC BLOOD PRESSURE: 98 MMHG | WEIGHT: 175.6 LBS | HEART RATE: 81 BPM | DIASTOLIC BLOOD PRESSURE: 62 MMHG | OXYGEN SATURATION: 98 % | HEIGHT: 70 IN | BODY MASS INDEX: 25.14 KG/M2

## 2025-05-01 DIAGNOSIS — F41.8 SITUATIONAL ANXIETY: ICD-10-CM

## 2025-05-01 DIAGNOSIS — D22.9 ATYPICAL MOLE: Primary | ICD-10-CM

## 2025-05-01 DIAGNOSIS — F31.81 BIPOLAR 2 DISORDER: Primary | ICD-10-CM

## 2025-05-01 DIAGNOSIS — D22.9 ATYPICAL MOLE: ICD-10-CM

## 2025-05-01 PROCEDURE — 99214 OFFICE O/P EST MOD 30 MIN: CPT | Performed by: NURSE PRACTITIONER

## 2025-05-01 PROCEDURE — 1125F AMNT PAIN NOTED PAIN PRSNT: CPT | Performed by: NURSE PRACTITIONER

## 2025-05-01 PROCEDURE — 3078F DIAST BP <80 MM HG: CPT | Performed by: NURSE PRACTITIONER

## 2025-05-01 PROCEDURE — 3074F SYST BP LT 130 MM HG: CPT | Performed by: NURSE PRACTITIONER

## 2025-05-01 RX ORDER — BUPROPION HYDROCHLORIDE 150 MG/1
150 TABLET ORAL DAILY
Qty: 30 TABLET | Refills: 2 | Status: SHIPPED | OUTPATIENT
Start: 2025-05-01

## 2025-05-01 NOTE — PROGRESS NOTES
"Chief Complaint  Suspicious Skin Lesion (Follow up on refferals. ), Mass (On left side of neck. ), and Med Refill (Anxiety f/u )    Subjective        Hanh Mederos presents to [facility]  History of Present Illness    Patient presents today for discussion on bipolar and anxiety management.  She also has not gotten in for referral to dermatology for her skin condition and is asking me to reset up.    Has had a lot of life changes.  Stays in room and cries.  Has started job at [employer].  Lives with son to help with his money issues having due to her ex  has passed.      Currently on lexapro 20mg, seroquel 100mg  Trazodone 50mg     Denies SI or HI    Objective   Vital Signs:  BP 98/62   Pulse 81   Ht 177.8 cm (70\")   Wt 79.7 kg (175 lb 9.6 oz)   SpO2 98%   BMI 25.20 kg/m²   Estimated body mass index is 25.2 kg/m² as calculated from the following:    Height as of this encounter: 177.8 cm (70\").    Weight as of this encounter: 79.7 kg (175 lb 9.6 oz).            Physical Exam  Constitutional:       Appearance: Normal appearance.   Cardiovascular:      Rate and Rhythm: Normal rate and regular rhythm.      Pulses: Normal pulses.   Pulmonary:      Effort: Pulmonary effort is normal.      Breath sounds: Normal breath sounds.   Skin:     General: Skin is warm and dry.   Neurological:      Mental Status: She is alert.   Psychiatric:         Mood and Affect: Mood normal.         Behavior: Behavior normal.         Thought Content: Thought content normal.         Judgment: Judgment normal.        Result Review :                Assessment and Plan   Diagnoses and all orders for this visit:    1. Bipolar 2 disorder (Primary)    2. Atypical mole    3. Situational anxiety    Other orders  -     buPROPion XL (Wellbutrin XL) 150 MG 24 hr tablet; Take 1 tablet by mouth Daily.  Dispense: 30 tablet; Refill: 2    We discussed options.  I discussed I am not sure that change of medication will be " helpful due to the situational matter.  She feels cautious to change Lexapro and Seroquel as she has done well for a long time.  Because of anger issues and irritability, we discussed add-on of Wellbutrin.  Patient is agreeable.  If she has any side effects or issues she is going to notify provider.  If any suicidal homicidal ideations she is going to go to the ER immediately    Referral made back for dermatology.  If she does not hear from them please let me know so we can follow-up.         Follow Up   Return in about 4 weeks (around 5/29/2025).  Patient was given instructions and counseling regarding her condition or for health maintenance advice. Please see specific information pulled into the AVS if appropriate.

## 2025-05-08 ENCOUNTER — OFFICE VISIT (OUTPATIENT)
Dept: FAMILY MEDICINE CLINIC | Facility: CLINIC | Age: 55
End: 2025-05-08
Payer: MEDICARE

## 2025-05-08 VITALS
SYSTOLIC BLOOD PRESSURE: 90 MMHG | OXYGEN SATURATION: 99 % | BODY MASS INDEX: 25.05 KG/M2 | HEIGHT: 70 IN | DIASTOLIC BLOOD PRESSURE: 62 MMHG | WEIGHT: 175 LBS | HEART RATE: 73 BPM

## 2025-05-08 DIAGNOSIS — J01.40 SUBACUTE PANSINUSITIS: Primary | ICD-10-CM

## 2025-05-08 PROCEDURE — 3078F DIAST BP <80 MM HG: CPT | Performed by: NURSE PRACTITIONER

## 2025-05-08 PROCEDURE — 3074F SYST BP LT 130 MM HG: CPT | Performed by: NURSE PRACTITIONER

## 2025-05-08 PROCEDURE — 1125F AMNT PAIN NOTED PAIN PRSNT: CPT | Performed by: NURSE PRACTITIONER

## 2025-05-08 PROCEDURE — 99213 OFFICE O/P EST LOW 20 MIN: CPT | Performed by: NURSE PRACTITIONER

## 2025-05-08 RX ORDER — METHYLPREDNISOLONE 4 MG/1
TABLET ORAL
Qty: 21 TABLET | Refills: 0 | Status: SHIPPED | OUTPATIENT
Start: 2025-05-08

## 2025-05-08 RX ORDER — LEVOFLOXACIN 500 MG/1
500 TABLET, FILM COATED ORAL DAILY
Qty: 7 TABLET | Refills: 0 | Status: SHIPPED | OUTPATIENT
Start: 2025-05-08

## 2025-05-08 RX ORDER — IPRATROPIUM BROMIDE 21 UG/1
2 SPRAY, METERED NASAL EVERY 12 HOURS
Qty: 30 ML | Refills: 0 | Status: SHIPPED | OUTPATIENT
Start: 2025-05-08

## 2025-05-08 NOTE — PROGRESS NOTES
"Chief Complaint  Neck Pain (Pt has stiff neck says its fungal menningitis, has been having headache and migraine with it. Says its been going on for over a week. )    Subjective        Hanh Mederos presents to [facility]  History of Present Illness      Patient presents today with complaint of neck pain.    She is worried about fungal meningitis because she was working on   Denies fever  Reports stiff neck, headache and sinus congestion.     Denies sensitivity to light.   Denies body aches otherwise.   Reports headaches go from report of 2 to 10.      Denies cough.     A little more tired than normal.    Still able to do all activities during the day.     OTC: nothing           Objective   Vital Signs:  BP 90/62   Pulse 73   Ht 177.8 cm (70\")   Wt 79.4 kg (175 lb)   SpO2 99%   BMI 25.11 kg/m²   Estimated body mass index is 25.11 kg/m² as calculated from the following:    Height as of this encounter: 177.8 cm (70\").    Weight as of this encounter: 79.4 kg (175 lb).            Physical Exam  Constitutional:       Appearance: Normal appearance.   HENT:      Head: Normocephalic and atraumatic.      Right Ear: Tympanic membrane has decreased mobility.      Left Ear: Tympanic membrane has decreased mobility.      Nose: Rhinorrhea present.      Right Sinus: Maxillary sinus tenderness and frontal sinus tenderness present.      Left Sinus: Maxillary sinus tenderness and frontal sinus tenderness present.   Cardiovascular:      Rate and Rhythm: Normal rate and regular rhythm.      Pulses: Normal pulses.   Pulmonary:      Effort: Pulmonary effort is normal.      Breath sounds: Normal breath sounds.   Skin:     General: Skin is warm and dry.   Neurological:      Mental Status: She is alert.   Psychiatric:         Mood and Affect: Mood normal.         Behavior: Behavior normal.         Thought Content: Thought content normal.         Judgment: Judgment normal.        Result Review :       "          Assessment and Plan   Diagnoses and all orders for this visit:    1. Subacute pansinusitis (Primary)    Other orders  -     ipratropium (ATROVENT) 0.03 % nasal spray; Administer 2 sprays into the nostril(s) as directed by provider Every 12 (Twelve) Hours.  Dispense: 30 mL; Refill: 0  -     methylPREDNISolone (MEDROL) 4 MG dose pack; Take as directed on package instructions.  Dispense: 21 tablet; Refill: 0  -     levoFLOXacin (LEVAQUIN) 500 MG tablet; Take 1 tablet by mouth Daily.  Dispense: 7 tablet; Refill: 0      Discussed with patient she seems to be having allergy flare from work on home she is doing.  If she is concerned for meningitis, encouraged patient to go to ER.    Discussed if she has any red flag symptoms, such as fever, fatigue, inability to stand, intractable vomiting, confusion, (worst headache of my life), inability to turn neck,  she should go to ER.    She verbalizes understanding and denies any of those symptoms currently       Follow Up   No follow-ups on file.  Patient was given instructions and counseling regarding her condition or for health maintenance advice. Please see specific information pulled into the AVS if appropriate.

## 2025-05-09 ENCOUNTER — EXTERNAL PBMM DATA (OUTPATIENT)
Dept: PHARMACY | Facility: OTHER | Age: 55
End: 2025-05-09
Payer: MEDICARE

## 2025-05-16 ENCOUNTER — TELEPHONE (OUTPATIENT)
Dept: FAMILY MEDICINE CLINIC | Facility: CLINIC | Age: 55
End: 2025-05-16

## 2025-05-16 NOTE — TELEPHONE ENCOUNTER
Pt called staing she had weird bruising and wondered if this was something she needed to be seen for or just closely monitor. Please advise

## 2025-05-19 NOTE — TELEPHONE ENCOUNTER
This could be related to a variety of different thing,s including the increased work she has been doing and the steroid she has been on recently, but has been a while since we check labs so she would like to get in to check her thyroid and check to make sure her platelet and hemoglobin counts are normal she may follow-up with me at any time.

## 2025-05-30 ENCOUNTER — OFFICE VISIT (OUTPATIENT)
Dept: FAMILY MEDICINE CLINIC | Facility: CLINIC | Age: 55
End: 2025-05-30
Payer: MEDICARE

## 2025-05-30 VITALS
OXYGEN SATURATION: 98 % | BODY MASS INDEX: 24.48 KG/M2 | SYSTOLIC BLOOD PRESSURE: 102 MMHG | WEIGHT: 171 LBS | HEIGHT: 70 IN | HEART RATE: 78 BPM | DIASTOLIC BLOOD PRESSURE: 64 MMHG

## 2025-05-30 DIAGNOSIS — Z86.2 HISTORY OF ANEMIA: ICD-10-CM

## 2025-05-30 DIAGNOSIS — R53.83 FATIGUE, UNSPECIFIED TYPE: ICD-10-CM

## 2025-05-30 DIAGNOSIS — E87.6 HYPOKALEMIA: ICD-10-CM

## 2025-05-30 DIAGNOSIS — E53.8 B12 DEFICIENCY: ICD-10-CM

## 2025-05-30 DIAGNOSIS — Z87.898 HISTORY OF BRUISING EASILY: Primary | ICD-10-CM

## 2025-05-30 NOTE — LETTER
[facility]  [address]  [place] [address]  [phone]  Dept: [phone]    25    RE:   Patient Name:  Hanh Mederos   :  1970      To whom it may concern,     Hanh is my patient, and has been under my care since .  I am familiar with her history and with the functional limitations imposed by her disability.  She meets the definition of disability under the Americans with Disabilities Act, the Fair Housing Act, and the Rehabilitation Act of .     Due to mental illness, Hanh has certain limitations regarding anxiety, mental, and physical health .  In order to help alleviate these difficulties, and to enhance his/her ability to live independently and to fully use and enjoy the dwelling unit you own and/or administer, I am prescribing an emotional support animal that will assist Hanh in coping with her disability.    I would be happy to answer other questions you may have concerning my recommendation that Hanh have an emotional support animal.  Should you have additional questions, please do not hesitate to contact me.    Sincerely,    EMILIANA Vazquez

## 2025-05-30 NOTE — PROGRESS NOTES
"Chief Complaint  Bleeding/Bruising (Pt has random bruising that she's unsure of how she's getting the, pt says she does plan on doing her cologuard but she is moving. )    Subjective        Hanh Mederos presents to [facility]  History of Present Illness    Patient presents today with complaint of bruising easily. Denies any fall or injury  Just noticed about last week.  States about 75% of bruising she cannot account for.  States she isn't working on the house she was discussing with me last time.     Works at [email or web address] as a .      Denies any easily bleeding.   Feet are tender when she walks and foot dryness.     Also reports increase in fatigue.       Mood and anxiety are improved from last time we saw each other as well.      She is also reporting that she is moving in a new house that only allows dogs up to 25 lbs.  She has a lab that is a certified [facility].   She is wondering if I will write a letter for her support animal.      Objective   Vital Signs:  /64   Pulse 78   Ht 177.8 cm (70\")   Wt 77.6 kg (171 lb)   SpO2 98%   BMI 24.54 kg/m²   Estimated body mass index is 24.54 kg/m² as calculated from the following:    Height as of this encounter: 177.8 cm (70\").    Weight as of this encounter: 77.6 kg (171 lb).    BMI is within normal parameters. No other follow-up for BMI required.      Physical Exam  Constitutional:       Appearance: Normal appearance.   Cardiovascular:      Rate and Rhythm: Normal rate and regular rhythm.      Pulses: Normal pulses.   Pulmonary:      Effort: Pulmonary effort is normal.      Breath sounds: Normal breath sounds.   Skin:     General: Skin is warm and dry.   Neurological:      Mental Status: She is alert.   Psychiatric:         Mood and Affect: Mood normal.         Behavior: Behavior normal.         Thought Content: Thought content normal.         Judgment: Judgment normal.        Result Review :                Assessment and Plan "   Diagnoses and all orders for this visit:    1. History of bruising easily (Primary)  -     CBC & Differential  -     Comprehensive Metabolic Panel  -     TSH  -     Iron Profile w/o Ferritin  -     Vitamin B12    2. B12 deficiency  -     CBC & Differential  -     Comprehensive Metabolic Panel  -     TSH  -     Iron Profile w/o Ferritin  -     Vitamin B12    3. Hypokalemia  -     CBC & Differential  -     Comprehensive Metabolic Panel  -     TSH  -     Iron Profile w/o Ferritin  -     Vitamin B12    4. History of anemia  -     CBC & Differential  -     Comprehensive Metabolic Panel  -     TSH  -     Iron Profile w/o Ferritin  -     Vitamin B12    5. Fatigue, unspecified type  -     CBC & Differential  -     Comprehensive Metabolic Panel  -     TSH  -     Iron Profile w/o Ferritin  -     Vitamin B12      We will check labs for easy bruising and call with results any changes needed plan of care.  We discussed various causes for the bruising.           Follow Up   Return in about 3 months (around 8/30/2025).  Patient was given instructions and counseling regarding her condition or for health maintenance advice. Please see specific information pulled into the AVS if appropriate.

## 2025-05-31 LAB
ALBUMIN SERPL-MCNC: 4.1 G/DL (ref 3.8–4.9)
ALP SERPL-CCNC: 130 IU/L (ref 44–121)
ALT SERPL-CCNC: 10 IU/L (ref 0–32)
AST SERPL-CCNC: 17 IU/L (ref 0–40)
BASOPHILS # BLD AUTO: 0 X10E3/UL (ref 0–0.2)
BASOPHILS NFR BLD AUTO: 1 %
BILIRUB SERPL-MCNC: 0.3 MG/DL (ref 0–1.2)
BUN SERPL-MCNC: 14 MG/DL (ref 6–24)
BUN/CREAT SERPL: 13 (ref 9–23)
CALCIUM SERPL-MCNC: 8.9 MG/DL (ref 8.7–10.2)
CHLORIDE SERPL-SCNC: 105 MMOL/L (ref 96–106)
CO2 SERPL-SCNC: 20 MMOL/L (ref 20–29)
CREAT SERPL-MCNC: 1.04 MG/DL (ref 0.57–1)
EGFRCR SERPLBLD CKD-EPI 2021: 63 ML/MIN/1.73
EOSINOPHIL # BLD AUTO: 0.1 X10E3/UL (ref 0–0.4)
EOSINOPHIL NFR BLD AUTO: 2 %
ERYTHROCYTE [DISTWIDTH] IN BLOOD BY AUTOMATED COUNT: 13.4 % (ref 11.7–15.4)
GLOBULIN SER CALC-MCNC: 1.8 G/DL (ref 1.5–4.5)
GLUCOSE SERPL-MCNC: 66 MG/DL (ref 70–99)
HCT VFR BLD AUTO: 37 % (ref 34–46.6)
HGB BLD-MCNC: 11.6 G/DL (ref 11.1–15.9)
IMM GRANULOCYTES # BLD AUTO: 0 X10E3/UL (ref 0–0.1)
IMM GRANULOCYTES NFR BLD AUTO: 0 %
IRON SATN MFR SERPL: 20 % (ref 15–55)
IRON SERPL-MCNC: 53 UG/DL (ref 27–159)
LYMPHOCYTES # BLD AUTO: 0.9 X10E3/UL (ref 0.7–3.1)
LYMPHOCYTES NFR BLD AUTO: 23 %
MCH RBC QN AUTO: 27.6 PG (ref 26.6–33)
MCHC RBC AUTO-ENTMCNC: 31.4 G/DL (ref 31.5–35.7)
MCV RBC AUTO: 88 FL (ref 79–97)
MONOCYTES # BLD AUTO: 0.4 X10E3/UL (ref 0.1–0.9)
MONOCYTES NFR BLD AUTO: 9 %
NEUTROPHILS # BLD AUTO: 2.6 X10E3/UL (ref 1.4–7)
NEUTROPHILS NFR BLD AUTO: 65 %
PLATELET # BLD AUTO: 158 X10E3/UL (ref 150–450)
POTASSIUM SERPL-SCNC: 4.2 MMOL/L (ref 3.5–5.2)
PROT SERPL-MCNC: 5.9 G/DL (ref 6–8.5)
RBC # BLD AUTO: 4.21 X10E6/UL (ref 3.77–5.28)
SODIUM SERPL-SCNC: 139 MMOL/L (ref 134–144)
TIBC SERPL-MCNC: 271 UG/DL (ref 250–450)
TSH SERPL DL<=0.005 MIU/L-ACNC: 0.33 UIU/ML (ref 0.45–4.5)
UIBC SERPL-MCNC: 218 UG/DL (ref 131–425)
VIT B12 SERPL-MCNC: 416 PG/ML (ref 232–1245)
WBC # BLD AUTO: 4 X10E3/UL (ref 3.4–10.8)

## 2025-06-04 ENCOUNTER — RESULTS FOLLOW-UP (OUTPATIENT)
Dept: FAMILY MEDICINE CLINIC | Facility: CLINIC | Age: 55
End: 2025-06-04
Payer: MEDICARE

## 2025-06-04 DIAGNOSIS — R79.89 ABNORMAL THYROID BLOOD TEST: Primary | ICD-10-CM

## 2025-06-04 DIAGNOSIS — R94.6 ABNORMAL RESULTS OF THYROID FUNCTION STUDIES: ICD-10-CM

## 2025-06-04 NOTE — TELEPHONE ENCOUNTER
Can you please let patient know that her thyroid was slightly abnormal I will ask her to come back in for some additional testing.

## 2025-06-04 NOTE — PROGRESS NOTES
Please call patient with results of labs.  Her platelet count was normal.  Her iron profile was normal.  B12 was normal.  Her thyroid was out of range.  Can we please add on a T3, T4, and TPO?

## 2025-06-11 ENCOUNTER — OFFICE VISIT (OUTPATIENT)
Dept: FAMILY MEDICINE CLINIC | Facility: CLINIC | Age: 55
End: 2025-06-11
Payer: MEDICARE

## 2025-06-11 VITALS
HEIGHT: 70 IN | HEART RATE: 80 BPM | BODY MASS INDEX: 24.34 KG/M2 | DIASTOLIC BLOOD PRESSURE: 72 MMHG | WEIGHT: 170 LBS | SYSTOLIC BLOOD PRESSURE: 140 MMHG | OXYGEN SATURATION: 100 %

## 2025-06-11 DIAGNOSIS — S60.222A CONTUSION OF LEFT HAND, INITIAL ENCOUNTER: Primary | ICD-10-CM

## 2025-06-11 DIAGNOSIS — R79.89 ABNORMAL THYROID BLOOD TEST: ICD-10-CM

## 2025-06-11 NOTE — PROGRESS NOTES
"Chief Complaint  Hand Pain (Left pinky may be broken)    Subjective        Hanh Mederos presents to [facility]  History of Present Illness  History of Present Illness  The patient is a 54-year-old female who presents with an acute concern.    She recounts an incident today where she tripped over a small metal pole while attending an event. In an attempt to avoid colliding with another individual, she pivoted and ended up hitting her hand against a wall. This resulted in a fracture of her pinky finger, which she subsequently splinted. She expresses no preference for a specific healthcare provider for her hand fracture, stating that she will go with whichever option is covered by her insurance. She also mentions that she does not currently have a hard splint for her hand.    Additionally, she had labs done last week and was called regarding the results. She is concerned about the results, particularly her TSH levels, which were reported as low. Further thyroid testing was recommended.       Objective   Vital Signs:  /72   Pulse 80   Ht 177.8 cm (70\")   Wt 77.1 kg (170 lb)   SpO2 100%   BMI 24.39 kg/m²   Estimated body mass index is 24.39 kg/m² as calculated from the following:    Height as of this encounter: 177.8 cm (70\").    Weight as of this encounter: 77.1 kg (170 lb).       BMI is within normal parameters. No other follow-up for BMI required.      Physical Exam   Physical Exam  General: Alert, in no acute distress  Extremities: Significant ecchymosis and bruising over the left distal fifth metacarpal, tenderness of the proximal phalanx       Result Review :          Results  Labs   - TSH: Low              Assessment and Plan     Diagnoses and all orders for this visit:    1. Contusion of left hand, initial encounter (Primary)  -     XR Hand 3+ View Left; Future    2. Abnormal thyroid blood test      Assessment & Plan  1. Fracture of the left fifth metacarpal.  - " Reports tripping over a metal pole and hitting her hand against a wall, resulting in a visible deformity of the pinky finger.  - Examination reveals significant ecchymoses and bruising over the distal fifth metacarpal and tenderness in the proximal phalanx.  - A stat referral to orthopedics will be initiated post-x-ray.  - An x-ray of the left fifth finger and fifth metacarpal has been ordered to confirm the diagnosis. Advised to continue using a splint for stabilization until further evaluation.    2. Low TSH levels.  - Recent lab results indicate low TSH levels.  - Additional thyroid function tests, including thyroid peroxidase antibody, T3, and T4, have been ordered to complete the thyroid panel.  - These tests do not require fasting.  - Patient will proceed with the x-ray before completing the thyroid panel.            Follow Up     No follow-ups on file.  Patient was given instructions and counseling regarding her condition or for health maintenance advice. Please see specific information pulled into the AVS if appropriate.    Patient or patient representative verbalized consent for the use of Ambient Listening during the visit with  Meredith Lea Kehrer, MD for chart documentation. 6/11/2025  15:04 EDT

## 2025-06-12 DIAGNOSIS — S62.639A CLOSED DISPLACED FRACTURE OF DISTAL PHALANX OF FINGER OF LEFT HAND: Primary | ICD-10-CM

## 2025-06-12 DIAGNOSIS — S60.222A CONTUSION OF LEFT HAND, INITIAL ENCOUNTER: ICD-10-CM

## 2025-06-13 RX ORDER — TRAZODONE HYDROCHLORIDE 50 MG/1
50 TABLET ORAL
Qty: 72 TABLET | Refills: 0 | Status: SHIPPED | OUTPATIENT
Start: 2025-06-13

## 2025-06-13 NOTE — TELEPHONE ENCOUNTER
Rx Refill Note  Requested Prescriptions     Pending Prescriptions Disp Refills    traZODone (DESYREL) 50 MG tablet [Pharmacy Med Name: trazodone 50 mg tablet] 72 tablet 0     Sig: TAKE ONE TABLET BY MOUTH AT BEDTIME      Last office visit with prescribing clinician: 5/30/2025   Last telemedicine visit with prescribing clinician: Visit date not found   Next office visit with prescribing clinician: 9/3/2025                         Would you like a call back once the refill request has been completed: [] Yes [] No    If the office needs to give you a call back, can they leave a voicemail: [] Yes [] No    Shanae Thomas MA  06/13/25, 12:09 EDT

## (undated) DEVICE — FRCP BX RADJAW4 NDL 2.8 240CM LG OG BX40

## (undated) DEVICE — TUBING, SUCTION, 1/4" X 10', STRAIGHT: Brand: MEDLINE

## (undated) DEVICE — LN SMPL CO2 SHTRM SD STREAM W/M LUER

## (undated) DEVICE — KT ORCA ORCAPOD DISP STRL

## (undated) DEVICE — CANN O2 ETCO2 FITS ALL CONN CO2 SMPL A/ 7IN DISP LF

## (undated) DEVICE — ADAPT CLN BIOGUARD AIR/H2O DISP

## (undated) DEVICE — SENSR O2 OXIMAX FNGR A/ 18IN NONSTR

## (undated) DEVICE — BITEBLOCK OMNI BLOC